# Patient Record
Sex: FEMALE | Race: OTHER | HISPANIC OR LATINO | URBAN - METROPOLITAN AREA
[De-identification: names, ages, dates, MRNs, and addresses within clinical notes are randomized per-mention and may not be internally consistent; named-entity substitution may affect disease eponyms.]

---

## 2020-02-05 ENCOUNTER — EMERGENCY (EMERGENCY)
Facility: HOSPITAL | Age: 45
LOS: 1 days | Discharge: HOME | End: 2020-02-05
Attending: EMERGENCY MEDICINE
Payer: MEDICAID

## 2020-02-05 VITALS
WEIGHT: 111.99 LBS | DIASTOLIC BLOOD PRESSURE: 93 MMHG | HEIGHT: 61 IN | HEART RATE: 119 BPM | RESPIRATION RATE: 20 BRPM | TEMPERATURE: 98 F | SYSTOLIC BLOOD PRESSURE: 141 MMHG | OXYGEN SATURATION: 100 %

## 2020-02-05 VITALS
OXYGEN SATURATION: 98 % | HEART RATE: 70 BPM | TEMPERATURE: 98 F | DIASTOLIC BLOOD PRESSURE: 71 MMHG | RESPIRATION RATE: 18 BRPM | SYSTOLIC BLOOD PRESSURE: 119 MMHG

## 2020-02-05 LAB
ALBUMIN SERPL ELPH-MCNC: 3.7 G/DL — SIGNIFICANT CHANGE UP (ref 3.5–5)
ALP SERPL-CCNC: 57 U/L — SIGNIFICANT CHANGE UP (ref 40–120)
ALT FLD-CCNC: 14 U/L DA — SIGNIFICANT CHANGE UP (ref 10–60)
ANION GAP SERPL CALC-SCNC: 8 MMOL/L — SIGNIFICANT CHANGE UP (ref 5–17)
APPEARANCE UR: CLEAR — SIGNIFICANT CHANGE UP
AST SERPL-CCNC: 11 U/L — SIGNIFICANT CHANGE UP (ref 10–40)
BASOPHILS # BLD AUTO: 0.04 K/UL — SIGNIFICANT CHANGE UP (ref 0–0.2)
BASOPHILS NFR BLD AUTO: 0.7 % — SIGNIFICANT CHANGE UP (ref 0–2)
BILIRUB SERPL-MCNC: 0.3 MG/DL — SIGNIFICANT CHANGE UP (ref 0.2–1.2)
BILIRUB UR-MCNC: NEGATIVE — SIGNIFICANT CHANGE UP
BUN SERPL-MCNC: 11 MG/DL — SIGNIFICANT CHANGE UP (ref 7–18)
CALCIUM SERPL-MCNC: 8.8 MG/DL — SIGNIFICANT CHANGE UP (ref 8.4–10.5)
CHLORIDE SERPL-SCNC: 107 MMOL/L — SIGNIFICANT CHANGE UP (ref 96–108)
CO2 SERPL-SCNC: 23 MMOL/L — SIGNIFICANT CHANGE UP (ref 22–31)
COLOR SPEC: YELLOW — SIGNIFICANT CHANGE UP
CREAT SERPL-MCNC: 0.96 MG/DL — SIGNIFICANT CHANGE UP (ref 0.5–1.3)
DIFF PNL FLD: ABNORMAL
EOSINOPHIL # BLD AUTO: 0.18 K/UL — SIGNIFICANT CHANGE UP (ref 0–0.5)
EOSINOPHIL NFR BLD AUTO: 3.1 % — SIGNIFICANT CHANGE UP (ref 0–6)
GLUCOSE SERPL-MCNC: 87 MG/DL — SIGNIFICANT CHANGE UP (ref 70–99)
GLUCOSE UR QL: NEGATIVE — SIGNIFICANT CHANGE UP
HCG SERPL-ACNC: <1 MIU/ML — SIGNIFICANT CHANGE UP
HCT VFR BLD CALC: 42.3 % — SIGNIFICANT CHANGE UP (ref 34.5–45)
HGB BLD-MCNC: 14.3 G/DL — SIGNIFICANT CHANGE UP (ref 11.5–15.5)
IMM GRANULOCYTES NFR BLD AUTO: 0.2 % — SIGNIFICANT CHANGE UP (ref 0–1.5)
KETONES UR-MCNC: ABNORMAL
LEUKOCYTE ESTERASE UR-ACNC: NEGATIVE — SIGNIFICANT CHANGE UP
LIDOCAIN IGE QN: 83 U/L — SIGNIFICANT CHANGE UP (ref 73–393)
LYMPHOCYTES # BLD AUTO: 1.53 K/UL — SIGNIFICANT CHANGE UP (ref 1–3.3)
LYMPHOCYTES # BLD AUTO: 26.4 % — SIGNIFICANT CHANGE UP (ref 13–44)
MAGNESIUM SERPL-MCNC: 1.8 MG/DL — SIGNIFICANT CHANGE UP (ref 1.6–2.6)
MCHC RBC-ENTMCNC: 31.9 PG — SIGNIFICANT CHANGE UP (ref 27–34)
MCHC RBC-ENTMCNC: 33.8 GM/DL — SIGNIFICANT CHANGE UP (ref 32–36)
MCV RBC AUTO: 94.4 FL — SIGNIFICANT CHANGE UP (ref 80–100)
MONOCYTES # BLD AUTO: 0.4 K/UL — SIGNIFICANT CHANGE UP (ref 0–0.9)
MONOCYTES NFR BLD AUTO: 6.9 % — SIGNIFICANT CHANGE UP (ref 2–14)
NEUTROPHILS # BLD AUTO: 3.64 K/UL — SIGNIFICANT CHANGE UP (ref 1.8–7.4)
NEUTROPHILS NFR BLD AUTO: 62.7 % — SIGNIFICANT CHANGE UP (ref 43–77)
NITRITE UR-MCNC: NEGATIVE — SIGNIFICANT CHANGE UP
NRBC # BLD: 0 /100 WBCS — SIGNIFICANT CHANGE UP (ref 0–0)
PH UR: 5 — SIGNIFICANT CHANGE UP (ref 5–8)
PLATELET # BLD AUTO: 248 K/UL — SIGNIFICANT CHANGE UP (ref 150–400)
POTASSIUM SERPL-MCNC: 3.7 MMOL/L — SIGNIFICANT CHANGE UP (ref 3.5–5.3)
POTASSIUM SERPL-SCNC: 3.7 MMOL/L — SIGNIFICANT CHANGE UP (ref 3.5–5.3)
PROT SERPL-MCNC: 7.4 G/DL — SIGNIFICANT CHANGE UP (ref 6–8.3)
PROT UR-MCNC: 30 MG/DL
RBC # BLD: 4.48 M/UL — SIGNIFICANT CHANGE UP (ref 3.8–5.2)
RBC # FLD: 12.4 % — SIGNIFICANT CHANGE UP (ref 10.3–14.5)
SODIUM SERPL-SCNC: 138 MMOL/L — SIGNIFICANT CHANGE UP (ref 135–145)
SP GR SPEC: 1.02 — SIGNIFICANT CHANGE UP (ref 1.01–1.02)
UROBILINOGEN FLD QL: NEGATIVE — SIGNIFICANT CHANGE UP
WBC # BLD: 5.8 K/UL — SIGNIFICANT CHANGE UP (ref 3.8–10.5)
WBC # FLD AUTO: 5.8 K/UL — SIGNIFICANT CHANGE UP (ref 3.8–10.5)

## 2020-02-05 PROCEDURE — 99283 EMERGENCY DEPT VISIT LOW MDM: CPT

## 2020-02-05 PROCEDURE — 83690 ASSAY OF LIPASE: CPT

## 2020-02-05 PROCEDURE — 36415 COLL VENOUS BLD VENIPUNCTURE: CPT

## 2020-02-05 PROCEDURE — 85027 COMPLETE CBC AUTOMATED: CPT

## 2020-02-05 PROCEDURE — 81001 URINALYSIS AUTO W/SCOPE: CPT

## 2020-02-05 PROCEDURE — 87086 URINE CULTURE/COLONY COUNT: CPT

## 2020-02-05 PROCEDURE — 83735 ASSAY OF MAGNESIUM: CPT

## 2020-02-05 PROCEDURE — 80053 COMPREHEN METABOLIC PANEL: CPT

## 2020-02-05 PROCEDURE — 99284 EMERGENCY DEPT VISIT MOD MDM: CPT

## 2020-02-05 PROCEDURE — 84702 CHORIONIC GONADOTROPIN TEST: CPT

## 2020-02-05 RX ORDER — SODIUM CHLORIDE 9 MG/ML
1000 INJECTION INTRAMUSCULAR; INTRAVENOUS; SUBCUTANEOUS ONCE
Refills: 0 | Status: COMPLETED | OUTPATIENT
Start: 2020-02-05 | End: 2020-02-05

## 2020-02-05 RX ADMIN — SODIUM CHLORIDE 1000 MILLILITER(S): 9 INJECTION INTRAMUSCULAR; INTRAVENOUS; SUBCUTANEOUS at 11:09

## 2020-02-05 NOTE — ED PROVIDER NOTE - GASTROINTESTINAL, MLM
Large palpable fibroid. No lower abdominal tenderness to palpation. Mild epigastric tenderness to palpation.

## 2020-02-05 NOTE — ED ADULT NURSE NOTE - CHPI ED NUR SYMPTOMS NEG
no burning urination/no hematuria/no blood in stool/no chills/no abdominal distension/no fever/no vomiting/no dysuria/no nausea

## 2020-02-05 NOTE — ED PROVIDER NOTE - PATIENT PORTAL LINK FT
You can access the FollowMyHealth Patient Portal offered by [facility] by registering at the following website: [email or web address]. By joining [employer]’s FollowMyHealth portal, you will also be able to view your health information using other applications (apps) compatible with our system.

## 2020-02-05 NOTE — ED PROVIDER NOTE - OBJECTIVE STATEMENT
44 year old female with PMHx of fibroids and no pertinent PSHx presents to the ED with complaints of six days of watery diarrhea. Patient reports that she previously visited her primary care doctor for evaluation of her symptoms, and was prescribed Cipro at the time for a possible bacterial infection. Patient states that she is currently on her third day of taking the medication. Patient notes that today her diarrhea has been transparent with a small amount of blood in it. Patient otherwise denies any fever and all other acute complaints. Patient reports that she returned from vacation to the [place] three weeks ago. NKDA.

## 2020-02-05 NOTE — ED PROVIDER NOTE - CLINICAL SUMMARY MEDICAL DECISION MAKING FREE TEXT BOX
Patient with diarrhea for six days, with about six episodes per day. Will check labs, give IV fluids, and will reassess.

## 2020-02-06 LAB
CULTURE RESULTS: SIGNIFICANT CHANGE UP
SPECIMEN SOURCE: SIGNIFICANT CHANGE UP

## 2021-12-20 PROBLEM — Z00.00 ENCOUNTER FOR PREVENTIVE HEALTH EXAMINATION: Status: ACTIVE | Noted: 2021-12-20

## 2022-01-31 ENCOUNTER — APPOINTMENT (OUTPATIENT)
Dept: OBGYN | Facility: CLINIC | Age: 47
End: 2022-01-31

## 2022-02-14 ENCOUNTER — APPOINTMENT (OUTPATIENT)
Dept: OBGYN | Facility: CLINIC | Age: 47
End: 2022-02-14
Payer: MEDICAID

## 2022-02-14 VITALS
OXYGEN SATURATION: 100 % | WEIGHT: 107 LBS | BODY MASS INDEX: 21.01 KG/M2 | DIASTOLIC BLOOD PRESSURE: 97 MMHG | HEART RATE: 107 BPM | HEIGHT: 60 IN | SYSTOLIC BLOOD PRESSURE: 139 MMHG

## 2022-02-14 DIAGNOSIS — Z30.432 ENCOUNTER FOR REMOVAL OF INTRAUTERINE CONTRACEPTIVE DEVICE: ICD-10-CM

## 2022-02-14 PROCEDURE — 58301 REMOVE INTRAUTERINE DEVICE: CPT

## 2022-02-14 NOTE — PROCEDURE
[IUD Removal] : intrauterine device (IUD) removal [Time out performed] : Pre-procedure time out performed.  Patient's name, date of birth and procedure confirmed. [Consent Obtained] : Consent obtained [Risks] : risks [Benefits] : benefits [Alternatives] : alternatives [Patient] : patient [Speculum Placed] : speculum placed [Strings Visualized] : strings visualized [IUD Discarded] : IUD discarded [Sent to Pathology] : specimen was placed in buffered formalin and sent for pathology [Tolerated Well] : Patient tolerated the procedure well [No Complications] : no complications [Heavy Vaginal Bleeding] : for heavy vaginal bleeding [Pelvic Pain] : for pelvic pain [___ Week(s)] : in [unfilled] week(s) [de-identified] : pelvic pain

## 2022-02-14 NOTE — PLAN
[FreeTextEntry1] : f/u in 2 wks \par patient is considering having a myomectomy vs hysterectomy due to the large fibroid

## 2022-03-03 ENCOUNTER — APPOINTMENT (OUTPATIENT)
Dept: OBGYN | Facility: CLINIC | Age: 47
End: 2022-03-03
Payer: MEDICAID

## 2022-03-03 VITALS
HEART RATE: 80 BPM | SYSTOLIC BLOOD PRESSURE: 123 MMHG | WEIGHT: 108 LBS | BODY MASS INDEX: 21.09 KG/M2 | DIASTOLIC BLOOD PRESSURE: 83 MMHG | OXYGEN SATURATION: 99 %

## 2022-03-03 DIAGNOSIS — D25.9 LEIOMYOMA OF UTERUS, UNSPECIFIED: ICD-10-CM

## 2022-03-03 PROCEDURE — 99214 OFFICE O/P EST MOD 30 MIN: CPT

## 2022-03-03 NOTE — PLAN
[FreeTextEntry1] : risks benefits treatment options discussed with the patient\par patient wants to have a TLH BS \par

## 2022-03-03 NOTE — HISTORY OF PRESENT ILLNESS
[FreeTextEntry1] : patient is here for surgery consult\par she desires a hysterectomy due to big fibroid that gives her pelvic pain and pressure and AUB \par she had recently removed her IUD that was being expel \par pelvic sono shows 8 cm intramural fibroid \par surgery hx BTL \par

## 2022-03-03 NOTE — COUNSELING
[Nutrition/ Exercise/ Weight Management] : nutrition, exercise, weight management [Body Image] : body image [Vitamins/Supplements] : vitamins/supplements [Pre/Post Op Instructions] : pre/post op instructions

## 2022-05-10 DIAGNOSIS — Z01.818 ENCOUNTER FOR OTHER PREPROCEDURAL EXAMINATION: ICD-10-CM

## 2022-06-03 ENCOUNTER — OUTPATIENT (OUTPATIENT)
Dept: OUTPATIENT SERVICES | Facility: HOSPITAL | Age: 47
LOS: 1 days | End: 2022-06-03
Payer: MEDICAID

## 2022-06-03 VITALS
HEART RATE: 71 BPM | RESPIRATION RATE: 18 BRPM | DIASTOLIC BLOOD PRESSURE: 65 MMHG | TEMPERATURE: 99 F | HEIGHT: 62 IN | OXYGEN SATURATION: 99 % | SYSTOLIC BLOOD PRESSURE: 111 MMHG | WEIGHT: 117.07 LBS

## 2022-06-03 DIAGNOSIS — D25.9 LEIOMYOMA OF UTERUS, UNSPECIFIED: ICD-10-CM

## 2022-06-03 DIAGNOSIS — Z01.818 ENCOUNTER FOR OTHER PREPROCEDURAL EXAMINATION: ICD-10-CM

## 2022-06-03 DIAGNOSIS — Z98.51 TUBAL LIGATION STATUS: Chronic | ICD-10-CM

## 2022-06-03 LAB
A1C WITH ESTIMATED AVERAGE GLUCOSE RESULT: 5.1 % — SIGNIFICANT CHANGE UP (ref 4–5.6)
BLD GP AB SCN SERPL QL: SIGNIFICANT CHANGE UP
ESTIMATED AVERAGE GLUCOSE: 100 MG/DL — SIGNIFICANT CHANGE UP (ref 68–114)

## 2022-06-03 PROCEDURE — G0463: CPT

## 2022-06-03 NOTE — H&P PST ADULT - HISTORY OF PRESENT ILLNESS
This is a yr old female with PMH of presents with c/o prolonged and heavdue to fibroids. Pt for  This is a 46 yr old female with no PMH presents with c/o intermittent pelvic pain due to a large fibroid. Pt denies any vaginal bleeding. Pt is scheduled for laparoscopic total hysterectomy with bilateral salpingectomy on 6/8/2022.  This is a 46 yr old  female with no PMH presents with c/o intermittent pelvic pain due to a large fibroid that measures about 9.1 cm in size. Pt denies any vaginal bleeding. Pt is scheduled for laparoscopic total hysterectomy with bilateral salpingectomy on 6/8/2022.

## 2022-06-03 NOTE — H&P PST ADULT - PROBLEM SELECTOR PLAN 1
Pt is scheduled for laparoscopic total hysterectomy with bilateral salpingectomy on 6/8/2022.   As per pt, she was optimized by PCP for surgery.  Preoperative instructions discussed with pt via [place] speaking daughter Kristen Hutson and pt's daughter Kristen.  Written instructions in [place] given to pt. Instructed pt that she will need someone to escort her home after surgery, to notify security when she arrives in the lobby of the hospital that she is here for surgery, not to eat or drink anything after midnight the night before the surgery, to avoid NSAIDs such as Ibuprofen, Motrin, Aleve, Advil, Naproxen before surgery, to take Tylenol if needed for pain, to report if she has been exposed to any one with any contagious diseases including Covid-19 or if she is exhibiting any symptoms of COVID-19, to keep appointment for COVID-19  test 3 days before surgery. Instructed about use of Chlorhexidine 4% soap for 3 days before surgery including the morning of the surgery to prevent infection. Verbalized understanding of instructions given.   WENDY stop bang score 1. Pt denies history of WENDY, never did sleep study, is at low risk for sleep apnea and is at low risk for pulmonary complications.

## 2022-06-03 NOTE — H&P PST ADULT - FEMALE-SPECIFIC SYMPTOMS
due to a large fibroid 9.1 cm/amenorrhea/pelvic pain due to a large fibroid about 9.1 cm in size/amenorrhea/pelvic pain

## 2022-06-03 NOTE — H&P PST ADULT - FALL HARM RISK - UNIVERSAL INTERVENTIONS
Bed in lowest position, wheels locked, appropriate side rails in place/Call bell, personal items and telephone in reach/Instruct patient to call for assistance before getting out of bed or chair/Non-slip footwear when patient is out of bed/[place] to call system/Physically safe environment - no spills, clutter or unnecessary equipment/Purposeful Proactive Rounding/Room/bathroom lighting operational, light cord in reach

## 2022-06-03 NOTE — H&P PST ADULT - ASSESSMENT
This is a 46 yr old female with no PMH presents with leiomyoma of uterus. Pt is scheduled for laparoscopic total hysterectomy with bilateral salpingectomy on 6/8/2022.     WENDY stop bang score 1. Pt denies history of WENDY, never did sleep study, is at low risk for sleep apnea and is at low risk for pulmonary complications.

## 2022-06-07 ENCOUNTER — TRANSCRIPTION ENCOUNTER (OUTPATIENT)
Age: 47
End: 2022-06-07

## 2022-06-08 ENCOUNTER — OUTPATIENT (OUTPATIENT)
Dept: OUTPATIENT SERVICES | Facility: HOSPITAL | Age: 47
LOS: 1 days | End: 2022-06-08
Payer: MEDICAID

## 2022-06-08 ENCOUNTER — TRANSCRIPTION ENCOUNTER (OUTPATIENT)
Age: 47
End: 2022-06-08

## 2022-06-08 ENCOUNTER — RESULT REVIEW (OUTPATIENT)
Age: 47
End: 2022-06-08

## 2022-06-08 VITALS
HEART RATE: 75 BPM | SYSTOLIC BLOOD PRESSURE: 117 MMHG | DIASTOLIC BLOOD PRESSURE: 69 MMHG | TEMPERATURE: 97 F | OXYGEN SATURATION: 100 % | RESPIRATION RATE: 17 BRPM

## 2022-06-08 VITALS
RESPIRATION RATE: 17 BRPM | TEMPERATURE: 99 F | WEIGHT: 117.07 LBS | HEIGHT: 62 IN | OXYGEN SATURATION: 100 % | DIASTOLIC BLOOD PRESSURE: 79 MMHG | SYSTOLIC BLOOD PRESSURE: 117 MMHG | HEART RATE: 86 BPM

## 2022-06-08 DIAGNOSIS — D25.9 LEIOMYOMA OF UTERUS, UNSPECIFIED: ICD-10-CM

## 2022-06-08 DIAGNOSIS — Z98.51 TUBAL LIGATION STATUS: Chronic | ICD-10-CM

## 2022-06-08 LAB
BLD GP AB SCN SERPL QL: SIGNIFICANT CHANGE UP
GLUCOSE BLDC GLUCOMTR-MCNC: 115 MG/DL — HIGH (ref 70–99)
GLUCOSE BLDC GLUCOMTR-MCNC: 92 MG/DL — SIGNIFICANT CHANGE UP (ref 70–99)
HCG UR QL: NEGATIVE — SIGNIFICANT CHANGE UP

## 2022-06-08 PROCEDURE — C1889: CPT

## 2022-06-08 PROCEDURE — 81025 URINE PREGNANCY TEST: CPT

## 2022-06-08 PROCEDURE — 86900 BLOOD TYPING SEROLOGIC ABO: CPT

## 2022-06-08 PROCEDURE — 82962 GLUCOSE BLOOD TEST: CPT

## 2022-06-08 PROCEDURE — 58558 HYSTEROSCOPY BIOPSY: CPT

## 2022-06-08 PROCEDURE — 86901 BLOOD TYPING SEROLOGIC RH(D): CPT

## 2022-06-08 PROCEDURE — 58573 TLH W/T/O UTERUS OVER 250 G: CPT

## 2022-06-08 PROCEDURE — 36415 COLL VENOUS BLD VENIPUNCTURE: CPT

## 2022-06-08 PROCEDURE — 86850 RBC ANTIBODY SCREEN: CPT

## 2022-06-08 DEVICE — SURGICEL POWDER 3.0GR
Type: IMPLANTABLE DEVICE | Status: NON-FUNCTIONAL
Removed: 2022-06-08

## 2022-06-08 RX ORDER — ACETAMINOPHEN 500 MG
2 TABLET ORAL
Qty: 40 | Refills: 1
Start: 2022-06-08 | End: 2022-06-17

## 2022-06-08 RX ORDER — SIMETHICONE 80 MG/1
1 TABLET, CHEWABLE ORAL
Qty: 30 | Refills: 0
Start: 2022-06-08 | End: 2022-06-12

## 2022-06-08 RX ORDER — ONDANSETRON 8 MG/1
4 TABLET, FILM COATED ORAL ONCE
Refills: 0 | Status: DISCONTINUED | OUTPATIENT
Start: 2022-06-08 | End: 2022-06-08

## 2022-06-08 RX ORDER — ACETAMINOPHEN 500 MG
2 TABLET ORAL
Qty: 0 | Refills: 0 | DISCHARGE

## 2022-06-08 RX ORDER — FENTANYL CITRATE 50 UG/ML
25 INJECTION INTRAVENOUS
Refills: 0 | Status: DISCONTINUED | OUTPATIENT
Start: 2022-06-08 | End: 2022-06-08

## 2022-06-08 RX ORDER — ACETAMINOPHEN 500 MG
1000 TABLET ORAL ONCE
Refills: 0 | Status: DISCONTINUED | OUTPATIENT
Start: 2022-06-08 | End: 2022-06-08

## 2022-06-08 RX ORDER — PANTOPRAZOLE SODIUM 20 MG/1
1 TABLET, DELAYED RELEASE ORAL
Qty: 30 | Refills: 0
Start: 2022-06-08 | End: 2022-07-07

## 2022-06-08 RX ORDER — METOCLOPRAMIDE HCL 10 MG
10 TABLET ORAL ONCE
Refills: 0 | Status: DISCONTINUED | OUTPATIENT
Start: 2022-06-08 | End: 2022-06-22

## 2022-06-08 RX ORDER — SIMETHICONE 80 MG/1
80 TABLET, CHEWABLE ORAL ONCE
Refills: 0 | Status: DISCONTINUED | OUTPATIENT
Start: 2022-06-08 | End: 2022-06-22

## 2022-06-08 RX ORDER — SODIUM CHLORIDE 9 MG/ML
3 INJECTION INTRAMUSCULAR; INTRAVENOUS; SUBCUTANEOUS EVERY 8 HOURS
Refills: 0 | Status: DISCONTINUED | OUTPATIENT
Start: 2022-06-08 | End: 2022-06-08

## 2022-06-08 RX ORDER — IBUPROFEN 200 MG
1 TABLET ORAL
Qty: 15 | Refills: 1
Start: 2022-06-08 | End: 2022-06-17

## 2022-06-08 RX ORDER — SENNOSIDES/DOCUSATE SODIUM 8.6MG-50MG
1 TABLET ORAL
Qty: 60 | Refills: 0
Start: 2022-06-08 | End: 2022-07-07

## 2022-06-08 RX ORDER — CHLORHEXIDINE GLUCONATE 213 G/1000ML
1 SOLUTION TOPICAL ONCE
Refills: 0 | Status: COMPLETED | OUTPATIENT
Start: 2022-06-08 | End: 2022-06-08

## 2022-06-08 RX ORDER — HYDROMORPHONE HYDROCHLORIDE 2 MG/ML
0.5 INJECTION INTRAMUSCULAR; INTRAVENOUS; SUBCUTANEOUS
Refills: 0 | Status: DISCONTINUED | OUTPATIENT
Start: 2022-06-08 | End: 2022-06-08

## 2022-06-08 RX ADMIN — CHLORHEXIDINE GLUCONATE 1 APPLICATION(S): 213 SOLUTION TOPICAL at 08:42

## 2022-06-08 NOTE — ASU PATIENT PROFILE, ADULT - FALL HARM RISK - UNIVERSAL INTERVENTIONS
Bed in lowest position, wheels locked, appropriate side rails in place/Call bell, personal items and telephone in reach/Instruct patient to call for assistance before getting out of bed or chair/Non-slip footwear when patient is out of bed/[place] to call system/Physically safe environment - no spills, clutter or unnecessary equipment/Purposeful Proactive Rounding/Room/bathroom lighting operational, light cord in reach

## 2022-06-08 NOTE — ASU DISCHARGE PLAN (ADULT/PEDIATRIC) - NS MD DC FALL RISK RISK
For information on Fall & Injury Prevention, visit: [email or web address] OR  [email or web address] OR  https://www.cdc.gov/steadi/patient.html

## 2022-06-08 NOTE — ASU DISCHARGE PLAN (ADULT/PEDIATRIC) - ACTIVITY LEVEL
No excercise/Nothing per rectum/Nothing per vagina/No tub baths/No douching/No tampons/No intercourse

## 2022-06-08 NOTE — ASU PREOP CHECKLIST - SITE MARKED BY ANESTHESIOLOGIST
Substance Abuse    Chemical dependency is an addiction to drugs or alcohol. It is characterized by the repeated behavior of seeking out and using drugs and alcohol despite harmful consequences to the health and safety of oneself and others. Using drugs in a manner that brought you to an Emergency Room suggests you may have an drug abuse problem. Seek help at a drug addiction center.    SEEK IMMEDIATE MEDICAL CARE IF YOU HAVE ANY OF THE FOLLOWING SYMPTOMS: chest pain, shortness of breath, change in mental status, thoughts about hurting killing yourself, thoughts about hurting or killing somebody else, hallucinations, or worsening depression.
n/a

## 2022-06-08 NOTE — ASU DISCHARGE PLAN (ADULT/PEDIATRIC) - ASU DC SPECIAL INSTRUCTIONSFT
no sex nothing in vagina no heavy lifting no pushing eat high fiber food ambulation daily as tolerated shower daily clean wound well and keep dry after; see your gynecologist in 1-2wks for follow up    SEE DR CARO IN 2WEEKS FOR WOUND CHECK  MAKE SURE YOU KEEP YOUR APPOINTMENT

## 2022-06-08 NOTE — PROGRESS NOTE ADULT - SUBJECTIVE AND OBJECTIVE BOX
post op note   s/p laparoscopy hyst/bs  pt in bed 14 in amb surg    pt doing well  ate crackers and juice  denies n/v  due to void post op     Vital Signs Last 24 Hrs  T(C): 36.1 (08 Jun 2022 14:23), Max: 37 (08 Jun 2022 08:25)  T(F): 97 (08 Jun 2022 14:23), Max: 98.6 (08 Jun 2022 08:25)  HR: 75 (08 Jun 2022 14:23) (73 - 86)  BP: 117/69 (08 Jun 2022 14:23) (108/71 - 129/76)  BP(mean): 83 (08 Jun 2022 14:00) (83 - 96)  RR: 17 (08 Jun 2022 14:23) (13 - 19)  SpO2: 100% (08 Jun 2022 14:23) (99% - 100%)    abd soft ND no guarding no rebound  inc sites no erythema/edema/bleeding  pelvic pad: dry no active vag bleeding    -dc home p void  -dw dr duran

## 2022-06-08 NOTE — ASU DISCHARGE PLAN (ADULT/PEDIATRIC) - CARE PROVIDER_API CALL
Windy Patterson; MS)  [facility] at [address], [place] [address]  Phone: [phone]  Fax: [phone]  Established Patient  Follow Up Time: 2 weeks

## 2022-06-08 NOTE — ASU PREOP CHECKLIST - HEIGHT IN INCHES
Review of Systems/Medical History  Patient summary reviewed  Chart reviewed  No history of anesthetic complications     Cardiovascular  Negative cardio ROS Hypertension ,    Pulmonary  Negative pulmonary ROS Smoker ex-smoker  ,        GI/Hepatic  Negative GI/hepatic ROS          Negative  ROS        Endo/Other  Negative endo/other ROS      GYN  Negative gynecology ROS          Hematology  Negative hematology ROS      Musculoskeletal  Negative musculoskeletal ROS        Neurology  Negative neurology ROS      Psychology   Negative psychology ROS            Recent Results (from the past 672 hour(s))   Novel Coronavirus (COVID-19), PCR [facility]    Collection Time: 07/15/20  9:37 AM   Result Value Ref Range    SARS-CoV-2  Not Detected Not Detected   INPATIENT (COVID-19 HIGH PRIORITY)    Collection Time: 07/15/20  9:37 AM   Result Value Ref Range    Inpatient Comment          Physical Exam    Airway    Mallampati score: II  TM Distance: >3 FB  Neck ROM: full     Dental   No notable dental hx     Cardiovascular  Comment: Negative ROS, Rhythm: regular, Rate: normal,     Pulmonary  Breath sounds clear to auscultation,     Other Findings        Anesthesia Plan  ASA Score- 2     Anesthesia Type- IV sedation with anesthesia with ASA Monitors  Additional Monitors:   Airway Plan:         Plan Factors-    Induction-     Postoperative Plan-     Informed Consent- Anesthetic plan and risks discussed with patient  I personally reviewed this patient with the CRNA  Discussed and agreed on the Anesthesia Plan with the CRNA  Romy Martino
2

## 2022-06-09 PROBLEM — D21.9 BENIGN NEOPLASM OF CONNECTIVE AND OTHER SOFT TISSUE, UNSPECIFIED: Chronic | Status: INACTIVE | Noted: 2020-02-05 | Resolved: 2022-06-03

## 2022-06-09 PROBLEM — D25.9 LEIOMYOMA OF UTERUS, UNSPECIFIED: Chronic | Status: ACTIVE | Noted: 2022-06-03

## 2022-06-10 LAB — SURGICAL PATHOLOGY STUDY: SIGNIFICANT CHANGE UP

## 2022-06-17 ENCOUNTER — TRANSCRIPTION ENCOUNTER (OUTPATIENT)
Age: 47
End: 2022-06-17

## 2022-06-17 ENCOUNTER — INPATIENT (INPATIENT)
Facility: HOSPITAL | Age: 47
LOS: 7 days | Discharge: HOME HEALTH | DRG: 856 | End: 2022-06-25
Attending: OBSTETRICS & GYNECOLOGY | Admitting: OBSTETRICS & GYNECOLOGY
Payer: MEDICAID

## 2022-06-17 VITALS
DIASTOLIC BLOOD PRESSURE: 73 MMHG | RESPIRATION RATE: 18 BRPM | HEIGHT: 62 IN | SYSTOLIC BLOOD PRESSURE: 104 MMHG | HEART RATE: 122 BPM | TEMPERATURE: 98 F | WEIGHT: 117.07 LBS | OXYGEN SATURATION: 97 %

## 2022-06-17 DIAGNOSIS — Z98.51 TUBAL LIGATION STATUS: Chronic | ICD-10-CM

## 2022-06-17 DIAGNOSIS — I82.890 ACUTE EMBOLISM AND THROMBOSIS OF OTHER SPECIFIED VEINS: ICD-10-CM

## 2022-06-17 DIAGNOSIS — T81.49XA INFECTION FOLLOWING A PROCEDURE, OTHER SURGICAL SITE, INITIAL ENCOUNTER: ICD-10-CM

## 2022-06-17 DIAGNOSIS — N73.9 FEMALE PELVIC INFLAMMATORY DISEASE, UNSPECIFIED: ICD-10-CM

## 2022-06-17 LAB
ALBUMIN SERPL ELPH-MCNC: 2.6 G/DL — LOW (ref 3.5–5)
ALP SERPL-CCNC: 95 U/L — SIGNIFICANT CHANGE UP (ref 40–120)
ALT FLD-CCNC: 47 U/L DA — SIGNIFICANT CHANGE UP (ref 10–60)
ANION GAP SERPL CALC-SCNC: 9 MMOL/L — SIGNIFICANT CHANGE UP (ref 5–17)
APPEARANCE UR: CLEAR — SIGNIFICANT CHANGE UP
AST SERPL-CCNC: 28 U/L — SIGNIFICANT CHANGE UP (ref 10–40)
BASOPHILS # BLD AUTO: 0.02 K/UL — SIGNIFICANT CHANGE UP (ref 0–0.2)
BASOPHILS NFR BLD AUTO: 0.2 % — SIGNIFICANT CHANGE UP (ref 0–2)
BILIRUB DIRECT SERPL-MCNC: 0.2 MG/DL — SIGNIFICANT CHANGE UP (ref 0–0.3)
BILIRUB INDIRECT FLD-MCNC: 0.2 MG/DL — SIGNIFICANT CHANGE UP (ref 0.2–1)
BILIRUB SERPL-MCNC: 0.4 MG/DL — SIGNIFICANT CHANGE UP (ref 0.2–1.2)
BILIRUB UR-MCNC: NEGATIVE — SIGNIFICANT CHANGE UP
BUN SERPL-MCNC: 12 MG/DL — SIGNIFICANT CHANGE UP (ref 7–18)
CALCIUM SERPL-MCNC: 9.1 MG/DL — SIGNIFICANT CHANGE UP (ref 8.4–10.5)
CHLORIDE SERPL-SCNC: 105 MMOL/L — SIGNIFICANT CHANGE UP (ref 96–108)
CO2 SERPL-SCNC: 25 MMOL/L — SIGNIFICANT CHANGE UP (ref 22–31)
COLOR SPEC: YELLOW — SIGNIFICANT CHANGE UP
CREAT SERPL-MCNC: 1.4 MG/DL — HIGH (ref 0.5–1.3)
DIFF PNL FLD: ABNORMAL
EGFR: 47 ML/MIN/1.73M2 — LOW
EOSINOPHIL # BLD AUTO: 0 K/UL — SIGNIFICANT CHANGE UP (ref 0–0.5)
EOSINOPHIL NFR BLD AUTO: 0 % — SIGNIFICANT CHANGE UP (ref 0–6)
GLUCOSE SERPL-MCNC: 132 MG/DL — HIGH (ref 70–99)
GLUCOSE UR QL: NEGATIVE — SIGNIFICANT CHANGE UP
HCG SERPL-ACNC: 4 MIU/ML — SIGNIFICANT CHANGE UP
HCT VFR BLD CALC: 39.1 % — SIGNIFICANT CHANGE UP (ref 34.5–45)
HGB BLD-MCNC: 13.5 G/DL — SIGNIFICANT CHANGE UP (ref 11.5–15.5)
IMM GRANULOCYTES NFR BLD AUTO: 2.2 % — HIGH (ref 0–1.5)
KETONES UR-MCNC: ABNORMAL
LACTATE SERPL-SCNC: 1.8 MMOL/L — SIGNIFICANT CHANGE UP (ref 0.7–2)
LEUKOCYTE ESTERASE UR-ACNC: ABNORMAL
LIDOCAIN IGE QN: 98 U/L — SIGNIFICANT CHANGE UP (ref 73–393)
LYMPHOCYTES # BLD AUTO: 0.72 K/UL — LOW (ref 1–3.3)
LYMPHOCYTES # BLD AUTO: 9 % — LOW (ref 13–44)
MCHC RBC-ENTMCNC: 31.2 PG — SIGNIFICANT CHANGE UP (ref 27–34)
MCHC RBC-ENTMCNC: 34.5 GM/DL — SIGNIFICANT CHANGE UP (ref 32–36)
MCV RBC AUTO: 90.3 FL — SIGNIFICANT CHANGE UP (ref 80–100)
MONOCYTES # BLD AUTO: 0.42 K/UL — SIGNIFICANT CHANGE UP (ref 0–0.9)
MONOCYTES NFR BLD AUTO: 5.2 % — SIGNIFICANT CHANGE UP (ref 2–14)
NEUTROPHILS # BLD AUTO: 6.7 K/UL — SIGNIFICANT CHANGE UP (ref 1.8–7.4)
NEUTROPHILS NFR BLD AUTO: 83.4 % — HIGH (ref 43–77)
NITRITE UR-MCNC: NEGATIVE — SIGNIFICANT CHANGE UP
NRBC # BLD: 0 /100 WBCS — SIGNIFICANT CHANGE UP (ref 0–0)
PH UR: 5 — SIGNIFICANT CHANGE UP (ref 5–8)
PLATELET # BLD AUTO: 373 K/UL — SIGNIFICANT CHANGE UP (ref 150–400)
POTASSIUM SERPL-MCNC: 3.8 MMOL/L — SIGNIFICANT CHANGE UP (ref 3.5–5.3)
POTASSIUM SERPL-SCNC: 3.8 MMOL/L — SIGNIFICANT CHANGE UP (ref 3.5–5.3)
PROT SERPL-MCNC: 7.8 G/DL — SIGNIFICANT CHANGE UP (ref 6–8.3)
PROT UR-MCNC: 100
RBC # BLD: 4.33 M/UL — SIGNIFICANT CHANGE UP (ref 3.8–5.2)
RBC # FLD: 12.1 % — SIGNIFICANT CHANGE UP (ref 10.3–14.5)
SARS-COV-2 RNA SPEC QL NAA+PROBE: SIGNIFICANT CHANGE UP
SODIUM SERPL-SCNC: 139 MMOL/L — SIGNIFICANT CHANGE UP (ref 135–145)
SP GR SPEC: 1.01 — SIGNIFICANT CHANGE UP (ref 1.01–1.02)
UROBILINOGEN FLD QL: NEGATIVE — SIGNIFICANT CHANGE UP
WBC # BLD: 8.04 K/UL — SIGNIFICANT CHANGE UP (ref 3.8–10.5)
WBC # FLD AUTO: 8.04 K/UL — SIGNIFICANT CHANGE UP (ref 3.8–10.5)

## 2022-06-17 PROCEDURE — 71045 X-RAY EXAM CHEST 1 VIEW: CPT | Mod: 26

## 2022-06-17 PROCEDURE — 74177 CT ABD & PELVIS W/CONTRAST: CPT | Mod: 26,MA

## 2022-06-17 PROCEDURE — 99285 EMERGENCY DEPT VISIT HI MDM: CPT

## 2022-06-17 RX ORDER — CEFTRIAXONE 500 MG/1
1000 INJECTION, POWDER, FOR SOLUTION INTRAMUSCULAR; INTRAVENOUS EVERY 24 HOURS
Refills: 0 | Status: COMPLETED | OUTPATIENT
Start: 2022-06-18 | End: 2022-06-24

## 2022-06-17 RX ORDER — VANCOMYCIN HCL 1 G
1000 VIAL (EA) INTRAVENOUS ONCE
Refills: 0 | Status: COMPLETED | OUTPATIENT
Start: 2022-06-17 | End: 2022-06-17

## 2022-06-17 RX ORDER — MORPHINE SULFATE 50 MG/1
4 CAPSULE, EXTENDED RELEASE ORAL EVERY 4 HOURS
Refills: 0 | Status: DISCONTINUED | OUTPATIENT
Start: 2022-06-17 | End: 2022-06-19

## 2022-06-17 RX ORDER — MORPHINE SULFATE 50 MG/1
4 CAPSULE, EXTENDED RELEASE ORAL ONCE
Refills: 0 | Status: DISCONTINUED | OUTPATIENT
Start: 2022-06-17 | End: 2022-06-17

## 2022-06-17 RX ORDER — ONDANSETRON 8 MG/1
4 TABLET, FILM COATED ORAL ONCE
Refills: 0 | Status: COMPLETED | OUTPATIENT
Start: 2022-06-17 | End: 2022-06-17

## 2022-06-17 RX ORDER — SODIUM CHLORIDE 9 MG/ML
2000 INJECTION INTRAMUSCULAR; INTRAVENOUS; SUBCUTANEOUS ONCE
Refills: 0 | Status: COMPLETED | OUTPATIENT
Start: 2022-06-17 | End: 2022-06-17

## 2022-06-17 RX ORDER — FAMOTIDINE 10 MG/ML
20 INJECTION INTRAVENOUS ONCE
Refills: 0 | Status: COMPLETED | OUTPATIENT
Start: 2022-06-17 | End: 2022-06-17

## 2022-06-17 RX ORDER — ACETAMINOPHEN 500 MG
650 TABLET ORAL ONCE
Refills: 0 | Status: COMPLETED | OUTPATIENT
Start: 2022-06-17 | End: 2022-06-17

## 2022-06-17 RX ORDER — CEFEPIME 1 G/1
2000 INJECTION, POWDER, FOR SOLUTION INTRAMUSCULAR; INTRAVENOUS ONCE
Refills: 0 | Status: COMPLETED | OUTPATIENT
Start: 2022-06-17 | End: 2022-06-17

## 2022-06-17 RX ORDER — CEFTRIAXONE 500 MG/1
INJECTION, POWDER, FOR SOLUTION INTRAMUSCULAR; INTRAVENOUS
Refills: 0 | Status: COMPLETED | OUTPATIENT
Start: 2022-06-17 | End: 2022-06-25

## 2022-06-17 RX ORDER — DIATRIZOATE MEGLUMINE 180 MG/ML
30 INJECTION, SOLUTION INTRAVESICAL ONCE
Refills: 0 | Status: COMPLETED | OUTPATIENT
Start: 2022-06-17 | End: 2022-06-17

## 2022-06-17 RX ORDER — INFLUENZA VIRUS VACCINE 15; 15; 15; 15 UG/.5ML; UG/.5ML; UG/.5ML; UG/.5ML
0.5 SUSPENSION INTRAMUSCULAR ONCE
Refills: 0 | Status: DISCONTINUED | OUTPATIENT
Start: 2022-06-17 | End: 2022-06-25

## 2022-06-17 RX ORDER — SODIUM CHLORIDE 9 MG/ML
1000 INJECTION, SOLUTION INTRAVENOUS
Refills: 0 | Status: DISCONTINUED | OUTPATIENT
Start: 2022-06-17 | End: 2022-06-19

## 2022-06-17 RX ORDER — ONDANSETRON 8 MG/1
4 TABLET, FILM COATED ORAL EVERY 6 HOURS
Refills: 0 | Status: DISCONTINUED | OUTPATIENT
Start: 2022-06-17 | End: 2022-06-25

## 2022-06-17 RX ORDER — ENOXAPARIN SODIUM 100 MG/ML
80 INJECTION SUBCUTANEOUS EVERY 24 HOURS
Refills: 0 | Status: DISCONTINUED | OUTPATIENT
Start: 2022-06-17 | End: 2022-06-18

## 2022-06-17 RX ORDER — KETOROLAC TROMETHAMINE 30 MG/ML
30 SYRINGE (ML) INJECTION EVERY 6 HOURS
Refills: 0 | Status: DISCONTINUED | OUTPATIENT
Start: 2022-06-17 | End: 2022-06-19

## 2022-06-17 RX ORDER — CEFTRIAXONE 500 MG/1
1000 INJECTION, POWDER, FOR SOLUTION INTRAMUSCULAR; INTRAVENOUS ONCE
Refills: 0 | Status: COMPLETED | OUTPATIENT
Start: 2022-06-17 | End: 2022-06-17

## 2022-06-17 RX ORDER — SODIUM CHLORIDE 9 MG/ML
1000 INJECTION INTRAMUSCULAR; INTRAVENOUS; SUBCUTANEOUS ONCE
Refills: 0 | Status: COMPLETED | OUTPATIENT
Start: 2022-06-17 | End: 2022-06-17

## 2022-06-17 RX ADMIN — SODIUM CHLORIDE 1000 MILLILITER(S): 9 INJECTION INTRAMUSCULAR; INTRAVENOUS; SUBCUTANEOUS at 14:00

## 2022-06-17 RX ADMIN — SODIUM CHLORIDE 2000 MILLILITER(S): 9 INJECTION INTRAMUSCULAR; INTRAVENOUS; SUBCUTANEOUS at 11:05

## 2022-06-17 RX ADMIN — MORPHINE SULFATE 4 MILLIGRAM(S): 50 CAPSULE, EXTENDED RELEASE ORAL at 12:10

## 2022-06-17 RX ADMIN — MORPHINE SULFATE 4 MILLIGRAM(S): 50 CAPSULE, EXTENDED RELEASE ORAL at 23:51

## 2022-06-17 RX ADMIN — DIATRIZOATE MEGLUMINE 30 MILLILITER(S): 180 INJECTION, SOLUTION INTRAVESICAL at 10:45

## 2022-06-17 RX ADMIN — CEFEPIME 2000 MILLIGRAM(S): 1 INJECTION, POWDER, FOR SOLUTION INTRAMUSCULAR; INTRAVENOUS at 12:10

## 2022-06-17 RX ADMIN — FAMOTIDINE 20 MILLIGRAM(S): 10 INJECTION INTRAVENOUS at 10:46

## 2022-06-17 RX ADMIN — Medication 100 MILLIGRAM(S): at 22:09

## 2022-06-17 RX ADMIN — MORPHINE SULFATE 4 MILLIGRAM(S): 50 CAPSULE, EXTENDED RELEASE ORAL at 15:48

## 2022-06-17 RX ADMIN — Medication 650 MILLIGRAM(S): at 11:11

## 2022-06-17 RX ADMIN — SODIUM CHLORIDE 2000 MILLILITER(S): 9 INJECTION INTRAMUSCULAR; INTRAVENOUS; SUBCUTANEOUS at 12:10

## 2022-06-17 RX ADMIN — Medication 100 MILLIGRAM(S): at 18:03

## 2022-06-17 RX ADMIN — CEFEPIME 100 MILLIGRAM(S): 1 INJECTION, POWDER, FOR SOLUTION INTRAMUSCULAR; INTRAVENOUS at 11:06

## 2022-06-17 RX ADMIN — SODIUM CHLORIDE 125 MILLILITER(S): 9 INJECTION, SOLUTION INTRAVENOUS at 21:18

## 2022-06-17 RX ADMIN — Medication 650 MILLIGRAM(S): at 12:10

## 2022-06-17 RX ADMIN — ONDANSETRON 4 MILLIGRAM(S): 8 TABLET, FILM COATED ORAL at 10:46

## 2022-06-17 RX ADMIN — ENOXAPARIN SODIUM 80 MILLIGRAM(S): 100 INJECTION SUBCUTANEOUS at 18:07

## 2022-06-17 RX ADMIN — Medication 30 MILLIGRAM(S): at 21:32

## 2022-06-17 RX ADMIN — Medication 250 MILLIGRAM(S): at 12:15

## 2022-06-17 RX ADMIN — Medication 30 MILLIGRAM(S): at 20:34

## 2022-06-17 RX ADMIN — CEFTRIAXONE 1000 MILLIGRAM(S): 500 INJECTION, POWDER, FOR SOLUTION INTRAMUSCULAR; INTRAVENOUS at 16:30

## 2022-06-17 RX ADMIN — MORPHINE SULFATE 4 MILLIGRAM(S): 50 CAPSULE, EXTENDED RELEASE ORAL at 10:46

## 2022-06-17 NOTE — PROGRESS NOTE ADULT - SUBJECTIVE AND OBJECTIVE BOX
patient states she has new onset of abdominal pain since this morning, she is not able to void. Had an episode of vomiting and fever at home.   Bladder scan shows 460 ml

## 2022-06-17 NOTE — ED PROVIDER NOTE - OBJECTIVE STATEMENT
Declines , uses daughter at bedside.  46yoF prev healthy other than fibroids s/p hysterectomy with Dr. Patterson 6/8 (on EMR review), presents with severe epigastric abdominal pain, gassy in character, since this morning. Associated NBNB vx1 today and fever. Denies d/c, urinary symptoms, CP, SOB, vaginal discharge, and all other symptoms.

## 2022-06-17 NOTE — H&P ADULT - PROBLEM SELECTOR PLAN 2
S/p vanco in ED, continue rocephin/clinda  pain mgmt with morphine/toradol and tylenol as needed  repeat cbc/bmp in am   lovenox 80mg therapeutic dose  Pelvic MRV  possible IR intervention, unavailable at this time

## 2022-06-17 NOTE — PATIENT PROFILE ADULT - FALL HARM RISK - RISK INTERVENTIONS

## 2022-06-17 NOTE — ED PROVIDER NOTE - CARE PLAN
1 Principal Discharge DX:	Pelvic abscess in female  Secondary Diagnosis:	Sepsis  Secondary Diagnosis:	GIOVANI (acute kidney injury)

## 2022-06-17 NOTE — ED PROVIDER NOTE - PHYSICAL EXAMINATION
Febrile, hemodynamically stable, saturating well on RA  Appears uncomfortable, no WOB  Head NCAT  EOMI grossly, anicteric  MMM  Breathing comfortably on RA  Abd diffusely TTP +guarding, difficulty assessing due to pt pushing away  AAO, CN's 3-12 grossly intact  CHOPRA spontaneously, no leg cyanosis or edema  Skin warm, well perfused, no rashes or hives

## 2022-06-17 NOTE — ED ADULT NURSE NOTE - OBJECTIVE STATEMENT
Patient came to the ED a/o x 3 ambulates BIBA for abdominal pain. Pt had recent abdominal surgery x 1 week, + nausea, no diarrhea noted.

## 2022-06-17 NOTE — ED ADULT NURSE NOTE - NS_SISCREENINGSR_GEN_ALL_ED
Chronic kidney disease, unspecified CKD stage Chronic kidney disease, unspecified CKD stage Chronic kidney disease, unspecified CKD stage Chronic kidney disease, unspecified CKD stage Hypoxic ischemic encephalopathy, unspecified severity Hypertension, unspecified type Hypertension, unspecified type Hypertension, unspecified type Hypertension, unspecified type Hypertension, unspecified type Hypertension, unspecified type Hypertension, unspecified type Hypertension, unspecified type Hypertension, unspecified type Hypertension, unspecified type Hypertension, unspecified type Hypertension, unspecified type Hypertension, unspecified type Hypertension, unspecified type Hypertension, unspecified type Hypertension, unspecified type Hypertension, unspecified type Hypertension, unspecified type Hypertension, unspecified type Hypertension, unspecified type Hypertension, unspecified type Hypertension, unspecified type Hypertension, unspecified type Hypertension, unspecified type Hypertension, unspecified type Hypertension, unspecified type Hypertension, unspecified type Hypertension, unspecified type Hypertension, unspecified type Hypertension, unspecified type Negative

## 2022-06-17 NOTE — PROGRESS NOTE ADULT - ASSESSMENT
urine retention  possible abdominal abscess vs ovarian vein thrombosis    will try to void / if can not void kendall catheter to be placed for 24 hrs  broad antibiotics to be started   lovenox prophylaxis     monitor VS

## 2022-06-17 NOTE — H&P ADULT - HISTORY OF PRESENT ILLNESS
45yo female P2, s/p TLH/BS on  with Dr. Patterson  presents to ED via ambulance c/o severe epigastric pain since this morning at 6am. Fever 103 upon arrival, s/p code sepsis.  Per daughter, patient went to void and developed sudden onset of sharp abdominal pains and needed assistance to get back to bed. +fevers 100.1, nausea/vomiting x1, headache, weakness.  Denies chest pains, sob, palpitations, dysuria, hematuria, worsening vaginal bleeding or leg swelling.    PMH: denies  PSH: BTL, TLH/BS on , uncomplicated with Leonardo Jordan and Adonis Jordan  POBhx:  x2 95, 2002  Pgyn: s/p TLH/BS for chronic pelvic pain and fibroids; denies STDs, abnormal pap smears  Meds: tylenol, motrin as needed for pain  Allergies: NKDA  Social: denies smoking, etoh or drug use    PE: Pt appears pale and weak, AAOx3  shivering  ICU Vital Signs Last 24 Hrs  T(C): 37 (2022 15:42), Max: 39.6 (2022 10:43)  T(F): 98.6 (2022 15:42), Max: 103.2 (2022 10:43)  HR: 98 (2022 15:42) (96 - 122)  BP: 108/72 (2022 15:42) (101/68 - 116/76)  RR: 18 (2022 15:42) (18 - 19)  SpO2: 98% (2022 15:42) (96% - 100%)    Chest: CTA bilaterally, no W/R/R, no intercostal retractions   Cardiac: no murmurs appreciated  Abd: diffuse tenderness and guarding, patient grimacing with any touch to belly; unable to assess for masses due to pain  Ext: soft, NT; no edema  Gyn: no vaginal bleeding    Labs:                         13.5   8.04  )-----------( 373      ( 2022 11:15 )             39.1     -    139  |  105  |  12  ----------------------------<  132<H>  3.8   |  25  |  1.40<H>    Ca    9.1      2022 11:15    TPro  7.8  /  Alb  2.6<L>  /  TBili  0.4  /  DBili  0.2  /  AST  28  /  ALT  47  /  AlkPhos  95      CT Abdomen and Pelvis w/ Oral Cont and w/ IV Cont (22 @ 14:06) >  PROCEDURE DATE:  2022    INTERPRETATION:  CLINICAL INFORMATION: Epigastric pain. Status post   hysterectomy on 22.  COMPARISON: None.  CONTRAST/COMPLICATIONS:  IV Contrast: Omnipaque 350  90 cc administered   10 cc discarded  Oral Contrast: NONE  Complications: None reported at time of study completion  PROCEDURE:  CT of the Abdomen and Pelvis was performed.  Sagittal and coronal reformats were performed.    FINDINGS:  LOWER CHEST: Mild bibasilar atelectasis. Small hiatal hernia containing   oral contrast.  LIVER: Scattered cysts and additional hypodensities too small to   characterize.  BILE DUCTS: Normal caliber.  GALLBLADDER: Within normal limits.  SPLEEN: Within normal limits.  PANCREAS: Within normal limits.  ADRENALS: Within normal limits.  KIDNEYS/URETERS: Within normal limits.  BLADDER: Air in the bladder likely related to a recent Lora catheter.  REPRODUCTIVE ORGANS: Hysterectomy.  BOWEL: No bowel obstruction. Appendix is normal. Wall thickening of the   descending and rectosigmoid colon may be related to underdistention. Mild   wall thickening of small bowel loops in the right and lower abdomen   (example 2, 82 and 4, 26).  PERITONEUM: Small to moderate ascites with peripheral enhancement. Rim   enhancing air and fluid collection in the pelvis measuring up to 7.9  x   3.1 x 4.7 cm suspicious for abscess (2, 108). Smaller loculated area of   rim enhancing fluid extendsto the right upper pelvis measuring up to 1.6   x 1.6 cm (2, 95).  VESSELS: Question filling defect within the right ovarian vein distally   (4, 41).  RETROPERITONEUM/LYMPH NODES: No lymphadenopathy.  ABDOMINAL WALL: Small fat-containing bilateral hernia.  BONES: Within normal limits.  IMPRESSION:  1. Rim-enhancing air and fluid collection in the pelvis measuring up to   7.9 x 3.1 x 4.7 cm, suspicious for abscess. Small to moderate ascites   with peripheral enhancement.  2. Suspected enteritis. Mild wall thickening of the descending and   rectosigmoid colon may be related to underdistention versus colitis.  3. Question central filling defect within the right ovarian vein distally   versus interdigitating fluid between branches. MRV can be obtained to   further evaluate for ovarian vein thrombosis.    d/w Dr. Patterson

## 2022-06-17 NOTE — H&P ADULT - PROBLEM SELECTOR PLAN 1
d/w Dr. Patterson  S/p vern in ED, continue rocephin/clinda  pain mgmt with morphine/toradol and tylenol as needed  repeat cbc/bmp in am   lovenox 80mg therapeutic dose  Pelvic MRV  possible IR intervention, unavailable at this time

## 2022-06-17 NOTE — ED PROVIDER NOTE - CLINICAL SUMMARY MEDICAL DECISION MAKING FREE TEXT BOX
No CP/SOB and low suspicion for PE. Character low suspicion for torsion. No CP/SOB and low suspicion for PE. Character low suspicion for torsion. Noted pelvic abscess as source of symptoms. Given fluids, broad spectrum abx. Pain controlled at this time. NAD, hemodynamically stable. Admitted to OB/GYN for further care.

## 2022-06-18 LAB
ALBUMIN SERPL ELPH-MCNC: 2 G/DL — LOW (ref 3.5–5)
ALP SERPL-CCNC: 75 U/L — SIGNIFICANT CHANGE UP (ref 40–120)
ALT FLD-CCNC: 28 U/L DA — SIGNIFICANT CHANGE UP (ref 10–60)
ANION GAP SERPL CALC-SCNC: 7 MMOL/L — SIGNIFICANT CHANGE UP (ref 5–17)
ANION GAP SERPL CALC-SCNC: 9 MMOL/L — SIGNIFICANT CHANGE UP (ref 5–17)
APPEARANCE UR: ABNORMAL
AST SERPL-CCNC: 19 U/L — SIGNIFICANT CHANGE UP (ref 10–40)
BASOPHILS # BLD AUTO: 0 K/UL — SIGNIFICANT CHANGE UP (ref 0–0.2)
BASOPHILS NFR BLD AUTO: 0 % — SIGNIFICANT CHANGE UP (ref 0–2)
BILIRUB SERPL-MCNC: 0.5 MG/DL — SIGNIFICANT CHANGE UP (ref 0.2–1.2)
BILIRUB UR-MCNC: NEGATIVE — SIGNIFICANT CHANGE UP
BLD GP AB SCN SERPL QL: SIGNIFICANT CHANGE UP
BUN SERPL-MCNC: 13 MG/DL — SIGNIFICANT CHANGE UP (ref 7–18)
BUN SERPL-MCNC: 14 MG/DL — SIGNIFICANT CHANGE UP (ref 7–18)
CALCIUM SERPL-MCNC: 8.5 MG/DL — SIGNIFICANT CHANGE UP (ref 8.4–10.5)
CALCIUM SERPL-MCNC: 8.6 MG/DL — SIGNIFICANT CHANGE UP (ref 8.4–10.5)
CHLORIDE SERPL-SCNC: 107 MMOL/L — SIGNIFICANT CHANGE UP (ref 96–108)
CHLORIDE SERPL-SCNC: 109 MMOL/L — HIGH (ref 96–108)
CO2 SERPL-SCNC: 21 MMOL/L — LOW (ref 22–31)
CO2 SERPL-SCNC: 23 MMOL/L — SIGNIFICANT CHANGE UP (ref 22–31)
COLOR SPEC: YELLOW — SIGNIFICANT CHANGE UP
CREAT SERPL-MCNC: 1.44 MG/DL — HIGH (ref 0.5–1.3)
CREAT SERPL-MCNC: 1.87 MG/DL — HIGH (ref 0.5–1.3)
CULTURE RESULTS: NO GROWTH — SIGNIFICANT CHANGE UP
DIFF PNL FLD: ABNORMAL
EGFR: 33 ML/MIN/1.73M2 — LOW
EGFR: 45 ML/MIN/1.73M2 — LOW
EOSINOPHIL # BLD AUTO: 0 K/UL — SIGNIFICANT CHANGE UP (ref 0–0.5)
EOSINOPHIL NFR BLD AUTO: 0 % — SIGNIFICANT CHANGE UP (ref 0–6)
GLUCOSE SERPL-MCNC: 102 MG/DL — HIGH (ref 70–99)
GLUCOSE SERPL-MCNC: 94 MG/DL — SIGNIFICANT CHANGE UP (ref 70–99)
GLUCOSE UR QL: NEGATIVE — SIGNIFICANT CHANGE UP
HCT VFR BLD CALC: 36.1 % — SIGNIFICANT CHANGE UP (ref 34.5–45)
HCT VFR BLD CALC: 36.3 % — SIGNIFICANT CHANGE UP (ref 34.5–45)
HGB BLD-MCNC: 12.2 G/DL — SIGNIFICANT CHANGE UP (ref 11.5–15.5)
HGB BLD-MCNC: 12.4 G/DL — SIGNIFICANT CHANGE UP (ref 11.5–15.5)
KETONES UR-MCNC: ABNORMAL
LACTATE SERPL-SCNC: 1.4 MMOL/L — SIGNIFICANT CHANGE UP (ref 0.7–2)
LEUKOCYTE ESTERASE UR-ACNC: ABNORMAL
LYMPHOCYTES # BLD AUTO: 1.72 K/UL — SIGNIFICANT CHANGE UP (ref 1–3.3)
LYMPHOCYTES # BLD AUTO: 9 % — LOW (ref 13–44)
MCHC RBC-ENTMCNC: 31.3 PG — SIGNIFICANT CHANGE UP (ref 27–34)
MCHC RBC-ENTMCNC: 31.3 PG — SIGNIFICANT CHANGE UP (ref 27–34)
MCHC RBC-ENTMCNC: 33.8 GM/DL — SIGNIFICANT CHANGE UP (ref 32–36)
MCHC RBC-ENTMCNC: 34.2 GM/DL — SIGNIFICANT CHANGE UP (ref 32–36)
MCV RBC AUTO: 91.7 FL — SIGNIFICANT CHANGE UP (ref 80–100)
MCV RBC AUTO: 92.6 FL — SIGNIFICANT CHANGE UP (ref 80–100)
MONOCYTES # BLD AUTO: 0.19 K/UL — SIGNIFICANT CHANGE UP (ref 0–0.9)
MONOCYTES NFR BLD AUTO: 1 % — LOW (ref 2–14)
NEUTROPHILS # BLD AUTO: 16.58 K/UL — HIGH (ref 1.8–7.4)
NEUTROPHILS NFR BLD AUTO: 87 % — HIGH (ref 43–77)
NITRITE UR-MCNC: NEGATIVE — SIGNIFICANT CHANGE UP
NRBC # BLD: 0 /100 WBCS — SIGNIFICANT CHANGE UP (ref 0–0)
PH UR: 6 — SIGNIFICANT CHANGE UP (ref 5–8)
PLATELET # BLD AUTO: 372 K/UL — SIGNIFICANT CHANGE UP (ref 150–400)
PLATELET # BLD AUTO: 384 K/UL — SIGNIFICANT CHANGE UP (ref 150–400)
POTASSIUM SERPL-MCNC: 3.9 MMOL/L — SIGNIFICANT CHANGE UP (ref 3.5–5.3)
POTASSIUM SERPL-MCNC: 4.1 MMOL/L — SIGNIFICANT CHANGE UP (ref 3.5–5.3)
POTASSIUM SERPL-SCNC: 3.9 MMOL/L — SIGNIFICANT CHANGE UP (ref 3.5–5.3)
POTASSIUM SERPL-SCNC: 4.1 MMOL/L — SIGNIFICANT CHANGE UP (ref 3.5–5.3)
PROT SERPL-MCNC: 6.7 G/DL — SIGNIFICANT CHANGE UP (ref 6–8.3)
PROT UR-MCNC: 100
RBC # BLD: 3.9 M/UL — SIGNIFICANT CHANGE UP (ref 3.8–5.2)
RBC # BLD: 3.96 M/UL — SIGNIFICANT CHANGE UP (ref 3.8–5.2)
RBC # FLD: 12.7 % — SIGNIFICANT CHANGE UP (ref 10.3–14.5)
RBC # FLD: 13 % — SIGNIFICANT CHANGE UP (ref 10.3–14.5)
SODIUM SERPL-SCNC: 137 MMOL/L — SIGNIFICANT CHANGE UP (ref 135–145)
SODIUM SERPL-SCNC: 139 MMOL/L — SIGNIFICANT CHANGE UP (ref 135–145)
SP GR SPEC: 1.02 — SIGNIFICANT CHANGE UP (ref 1.01–1.02)
SPECIMEN SOURCE: SIGNIFICANT CHANGE UP
UROBILINOGEN FLD QL: NEGATIVE — SIGNIFICANT CHANGE UP
WBC # BLD: 18.32 K/UL — HIGH (ref 3.8–10.5)
WBC # BLD: 19.06 K/UL — HIGH (ref 3.8–10.5)
WBC # FLD AUTO: 18.32 K/UL — HIGH (ref 3.8–10.5)
WBC # FLD AUTO: 19.06 K/UL — HIGH (ref 3.8–10.5)

## 2022-06-18 PROCEDURE — 99291 CRITICAL CARE FIRST HOUR: CPT | Mod: GC

## 2022-06-18 PROCEDURE — 74178 CT ABD&PLV WO CNTR FLWD CNTR: CPT | Mod: 26

## 2022-06-18 DEVICE — SPONGE HSTAT SURGICEL 2X14"
Type: IMPLANTABLE DEVICE | Status: NON-FUNCTIONAL
Removed: 2022-06-18

## 2022-06-18 DEVICE — CATH URET 5FR 70CM
Type: IMPLANTABLE DEVICE | Status: NON-FUNCTIONAL
Removed: 2022-06-18

## 2022-06-18 RX ORDER — ONDANSETRON 8 MG/1
4 TABLET, FILM COATED ORAL ONCE
Refills: 0 | Status: DISCONTINUED | OUTPATIENT
Start: 2022-06-18 | End: 2022-06-18

## 2022-06-18 RX ORDER — CEFAZOLIN SODIUM 1 G
VIAL (EA) INJECTION
Refills: 0 | Status: DISCONTINUED | OUTPATIENT
Start: 2022-06-18 | End: 2022-06-19

## 2022-06-18 RX ORDER — ACETAMINOPHEN 500 MG
1000 TABLET ORAL ONCE
Refills: 0 | Status: DISCONTINUED | OUTPATIENT
Start: 2022-06-18 | End: 2022-06-18

## 2022-06-18 RX ORDER — SODIUM CHLORIDE 9 MG/ML
500 INJECTION INTRAMUSCULAR; INTRAVENOUS; SUBCUTANEOUS ONCE
Refills: 0 | Status: COMPLETED | OUTPATIENT
Start: 2022-06-18 | End: 2022-06-18

## 2022-06-18 RX ORDER — CEFAZOLIN SODIUM 1 G
2000 VIAL (EA) INJECTION EVERY 8 HOURS
Refills: 0 | Status: DISCONTINUED | OUTPATIENT
Start: 2022-06-19 | End: 2022-06-19

## 2022-06-18 RX ORDER — HYDROMORPHONE HYDROCHLORIDE 2 MG/ML
0.5 INJECTION INTRAMUSCULAR; INTRAVENOUS; SUBCUTANEOUS
Refills: 0 | Status: DISCONTINUED | OUTPATIENT
Start: 2022-06-18 | End: 2022-06-18

## 2022-06-18 RX ORDER — CEFAZOLIN SODIUM 1 G
2000 VIAL (EA) INJECTION ONCE
Refills: 0 | Status: COMPLETED | OUTPATIENT
Start: 2022-06-18 | End: 2022-06-18

## 2022-06-18 RX ORDER — SODIUM CHLORIDE 9 MG/ML
1000 INJECTION, SOLUTION INTRAVENOUS
Refills: 0 | Status: DISCONTINUED | OUTPATIENT
Start: 2022-06-18 | End: 2022-06-18

## 2022-06-18 RX ADMIN — Medication 30 MILLIGRAM(S): at 11:09

## 2022-06-18 RX ADMIN — Medication 30 MILLIGRAM(S): at 21:44

## 2022-06-18 RX ADMIN — ENOXAPARIN SODIUM 80 MILLIGRAM(S): 100 INJECTION SUBCUTANEOUS at 15:11

## 2022-06-18 RX ADMIN — HYDROMORPHONE HYDROCHLORIDE 0.5 MILLIGRAM(S): 2 INJECTION INTRAMUSCULAR; INTRAVENOUS; SUBCUTANEOUS at 20:59

## 2022-06-18 RX ADMIN — Medication 30 MILLIGRAM(S): at 21:29

## 2022-06-18 RX ADMIN — MORPHINE SULFATE 4 MILLIGRAM(S): 50 CAPSULE, EXTENDED RELEASE ORAL at 08:31

## 2022-06-18 RX ADMIN — Medication 100 MILLIGRAM(S): at 15:11

## 2022-06-18 RX ADMIN — Medication 30 MILLIGRAM(S): at 11:39

## 2022-06-18 RX ADMIN — HYDROMORPHONE HYDROCHLORIDE 0.5 MILLIGRAM(S): 2 INJECTION INTRAMUSCULAR; INTRAVENOUS; SUBCUTANEOUS at 21:14

## 2022-06-18 RX ADMIN — MORPHINE SULFATE 4 MILLIGRAM(S): 50 CAPSULE, EXTENDED RELEASE ORAL at 04:56

## 2022-06-18 RX ADMIN — Medication 30 MILLIGRAM(S): at 05:22

## 2022-06-18 RX ADMIN — SODIUM CHLORIDE 125 MILLILITER(S): 9 INJECTION, SOLUTION INTRAVENOUS at 05:50

## 2022-06-18 RX ADMIN — Medication 30 MILLIGRAM(S): at 05:28

## 2022-06-18 RX ADMIN — Medication 100 MILLIGRAM(S): at 05:23

## 2022-06-18 RX ADMIN — SODIUM CHLORIDE 500 MILLILITER(S): 9 INJECTION INTRAMUSCULAR; INTRAVENOUS; SUBCUTANEOUS at 22:45

## 2022-06-18 RX ADMIN — CEFTRIAXONE 100 MILLIGRAM(S): 500 INJECTION, POWDER, FOR SOLUTION INTRAMUSCULAR; INTRAVENOUS at 15:11

## 2022-06-18 RX ADMIN — Medication 100 MILLIGRAM(S): at 18:47

## 2022-06-18 RX ADMIN — MORPHINE SULFATE 4 MILLIGRAM(S): 50 CAPSULE, EXTENDED RELEASE ORAL at 09:01

## 2022-06-18 NOTE — CHART NOTE - NSCHARTNOTEFT_GEN_A_CORE
[facility] FOLLOW UP NOTE     Pt seen at bedside, states to feel improved with morphine, although still unable to void  bladder scan showed 240  Pt noted to have elevated Cr 1.87 today, 1.4 yesterday (baseline Cr 0.9)   CBC with leukocytosis, although patient afebrile   kendall placed with output     CT urogram ordered to r/o ureteral obstruction   await MRV pelvis   will maintain kendall   f/u cbc/bmp in am   case d/w Dr. Patterson [facility] FOLLOW UP NOTE     Pt seen at bedside, states to feel improved with morphine, although still unable to void  bladder scan showed 240  Pt noted to have elevated Cr 1.87 today, 1.4 yesterday (baseline Cr 0.9)   CBC with leukocytosis, although patient afebrile   kendall placed with output 380  will send UA, urine culture from kendall     CT urogram ordered to r/o ureteral obstruction   await MRV pelvis   will maintain kendall   f/u cbc/bmp in am   case d/w Dr. Patterson

## 2022-06-18 NOTE — CONSULT NOTE ADULT - ASSESSMENT
46y old  Female who presents with a chief complaint of pelvic abscess and fever s/p hysterectomy 6/8/2022, comes in with abdominal pain, was found to have bladder perforation. S/p exploratory laparotomy, repair of bladder perforation, abdominal washout 6/18 became hypotensive. Was given 4L IVF. Patient is being admitted to ICU for septic shock requiring pressors.    #Septic shock 2/2 Urosepsis  #Bladder perf s/p blader repair  #GIOVANI    #NEURO  No issues    #Pulmonary  No issues    #CVS  Septic shock    #GI  No issues    #  Urosepsis and Bladder perf s/p repair     #Endo  No issues    #Prophylactic  On SCd for DVT prophylaxis  46y old  Female who presents with a chief complaint of pelvic abscess and fever s/p hysterectomy 6/8/2022, comes in with abdominal pain, was found to have bladder perforation. S/p exploratory laparotomy, repair of bladder perforation, abdominal washout 6/18 became hypotensive. Was given 4L IVF. Patient is being admitted to ICU for septic shock requiring pressors.    #Septic shock 2/2 Urosepsis  #Bladder perf s/p blader repair  #GIOVANI    #NEURO  No issues    #Pulmonary  No issues    #CVS  Septic shock 2/2 Urosepsis  -p/w abdominal pain, dx with bladder perf, s/p repair on 6/18  -Post op course complicated by septic shock refractory to fluids, received total of 4L intra and post op  -Will do a central line and start on pressors  -Will send in lactate and give more fluids if required  -c/w rocephin  -Monitor Vital signs    #GI  No issues    #  Urosepsis and Bladder perf s/p repair   -c/w rocephin  -Start on pressors once central line is confirmed  -c/w IV hydration  -surgery and GYn is following    GIOVANI  2/2 urosepsis and dehydration  c/w IV hydration  c/w antbx    #Endo  No issues    #Prophylactic  On SCd for DVT prophylaxis

## 2022-06-18 NOTE — CHART NOTE - NSCHARTNOTEFT_GEN_A_CORE
Pt evaluated at bedside with Surgery PA   ICU was consulted, pt hypotensive and tachycardic post operatively     ICU resident present at bedside    Pt resting offers no acute complaints. Reports pain is well controlled. Lora catheter in place draining blood tinged urine. Pt denies CP, SOB, N/V/D/C, fever, chills, dizziness or any other complaints    ICU Vital Signs Last 24 Hrs  T(C): 36.8 (18 Jun 2022 23:00), Max: 37.7 (18 Jun 2022 20:29)  T(F): 98.3 (18 Jun 2022 23:00), Max: 99.9 (18 Jun 2022 20:29)  HR: 130 (18 Jun 2022 23:00) (97 - 132)  BP: 88/56 (18 Jun 2022 23:00) (88/56 - 114/70)  BP(mean): 79 (18 Jun 2022 21:50) (76 - 84)  ABP: --  ABP(mean): --  RR: 18 (18 Jun 2022 23:00) (16 - 30)  SpO2: 98% (18 Jun 2022 23:00) (94% - 98%)      PE  Gen: A&Ox3, NAD, appears pale   Abd: soft, nondistended, dressing C/D/I   WALI drain with ~25cc dark serosanguinous fluid   Pelvic: no vaginal bleeding    I&O's Detail    18 Jun 2022 07:01  -  18 Jun 2022 23:44  --------------------------------------------------------  IN:    Lactated Ringers: 1000 mL    Lactated Ringers Bolus: 1500 mL  Total IN: 2500 mL    OUT:    Blood Loss (mL): 50 mL    Indwelling Catheter - Urethral (mL): 200 mL    Voided (mL): 750 mL  Total OUT: 1000 mL    Total NET: 1500 mL                          12.2   18.32 )-----------( 384      ( 18 Jun 2022 16:53 )             36.1   06-18    137  |  107  |  13  ----------------------------<  94  3.9   |  23  |  1.44<H>    Ca    8.6      18 Jun 2022 16:53    TPro  6.7  /  Alb  2.0<L>  /  TBili  0.5  /  DBili  x   /  AST  19  /  ALT  28  /  AlkPhos  75  06-18    A/p: POD #10 TLH/BS, readmit for CODE SEPSIS in ED, fever 103 and abdominal pain; abscess vs right ovarian vein thrombosis  POD #0 re-op bladder laceration repair  now with hypotension and tachycardia   -pt receiving IV fluid bolus  -ICU resident at bedside  -Dr. Patterson notified  -continue close monitoring

## 2022-06-18 NOTE — CONSULT NOTE ADULT - SUBJECTIVE AND OBJECTIVE BOX
UROLOGY CONSULTATION NOTE  Patient is a 46y old  Female who presents with a chief complaint of pelvic abscess and fever s/p hysterectomy 2022 (2022 08:30)    HPI: 46F s/p laparoscopic hysterectomy with b/l salpingo-oophorectomy with Dr. Patterson on  presents to ED via ambulance c/o severe epigastric pain since this morning at 6am. Fever 103 upon arrival, code sepsis called. Per daughter, patient went to void and developed sudden onset of sharp abdominal pains and needed assistance to get back to bed. +fevers 100.1, nausea/vomiting x1, headache, weakness. Denies chest pains, sob, palpitations, dysuria, hematuria, worsening vaginal bleeding or leg swelling.    Urology consulted for CT urogram findings of bladder perforation with contrast extravasation into posterior abdominal cavity. Pt reports pain that starts in the suprapubic region and goes all over the abdomen. Denies nausea/vomiting at the moment. Reports before the kendall catheter was placed she was unable to urinate secondary to severe pain that she described as a burning/hot sensation.     PAST MEDICAL & SURGICAL HISTORY:  Leiomyoma of uterus  History of bilateral tubal ligation    REVIEW OF SYSTEMS:  please see HPI    MEDICATIONS  (STANDING):  cefTRIAXone   IVPB 1000 milliGRAM(s) IV Intermittent every 24 hours  cefTRIAXone   IVPB      clindamycin IVPB      clindamycin IVPB 600 milliGRAM(s) IV Intermittent every 8 hours  enoxaparin Injectable 80 milliGRAM(s) SubCutaneous every 24 hours  influenza   Vaccine 0.5 milliLiter(s) IntraMuscular once  lactated ringers. 1000 milliLiter(s) (125 mL/Hr) IV Continuous <Continuous>    MEDICATIONS  (PRN):  ketorolac   Injectable 30 milliGRAM(s) IV Push every 6 hours PRN Moderate Pain (4 - 6)  morphine  - Injectable 4 milliGRAM(s) IV Push every 4 hours PRN Severe Pain (7 - 10)  ondansetron Injectable 4 milliGRAM(s) IV Push every 6 hours PRN Nausea and/or Vomiting    Allergies: No Known Allergies    FAMILY HISTORY:  Family history of hypertension (Mother)    Vital Signs Last 24 Hrs  T(C): 37 (2022 15:42), Max: 39.6 (2022 10:43)  T(F): 98.6 (2022 15:42), Max: 103.2 (2022 10:43)  HR: 98 (2022 15:42) (96 - 122)  BP: 108/72 (2022 15:42) (101/68 - 116/76)  RR: 18 (2022 15:42) (18 - 19)  SpO2: 98% (2022 15:42) (96% - 100%)    Physical Exam:   General: in mild distress, appears uncomfortable  Respiratory: non-labored  Abdomen: diffuse tenderness with guarding  : kendall in place with yellow urine with sediment in tubing    LABS:                        12.4   19.06 )-----------( 372      ( 2022 08:47 )             36.3     06-18    139  |  109<H>  |  14  ----------------------------<  102<H>  4.1   |  21<L>  |  1.87<H>    Ca    8.5      2022 08:47    TPro  7.8  /  Alb  2.6<L>  /  TBili  0.4  /  DBili  0.2  /  AST  28  /  ALT  47  /  AlkPhos  95  06-17      Urinalysis Basic - ( 2022 12:53 )  Color: Yellow / Appearance: Slightly Turbid / S.020 / pH: x  Gluc: x / Ketone: Small  / Bili: Negative / Urobili: Negative   Blood: x / Protein: 100 / Nitrite: Negative   Leuk Esterase: Moderate / RBC: >50 /HPF / WBC >50 /HPF   Sq Epi: x / Non Sq Epi: Negative /HPF / Bacteria: Few /HPF    RADIOLOGY & ADDITIONAL STUDIES:   < from: CT Abdomen and Pelvis w/wo IV Cont (22 @ 14:16) >  PROCEDURE:  CT of the Abdomen and Pelvis was performed.  Sagittal and coronal reformats were performed.    FINDINGS:  LOWER CHEST: There are mild bilateral layering pleural effusions with   moderate bilateral lower lobe dependent compressive atelectasis.    LIVER: Scattered hepatic cysts. Otherwise, within normal limits.  BILE DUCTS: Normal caliber.  GALLBLADDER: Within normal limits.  SPLEEN: Within normal limits.  PANCREAS: Within normal limits.  ADRENALS: Within normal limits.  KIDNEYS/URETERS: There is persistent enhancement of the bilateral renal   cortices and excreted contrast in the renal collecting systems and   ureters excretion of the gallbladder. Otherwise, the kidneys are   symmetric in appearance, enhancement, and excretion.    The renal collecting systems and ureters aresymmetric in opacification   and course.    BLADDER: There is a Kendall catheter extending through an 8 mm defect in   the posterior midline wall on image 1:30, with the balloon inflated   within the peritoneal cavity of the pelvis. There is extravasation of   excreted contrast throughout the peritoneal cavity.  REPRODUCTIVE ORGANS: The uterus and ovaries have been removed.    BOWEL: No bowel obstruction.  PERITONEUM: No ascites.  VESSELS: Within normal limits.  RETROPERITONEUM/LYMPH NODES: No lymphadenopathy. Shotty para-aortic and   paracaval lymph nodes.  ABDOMINAL WALL: Within normal limits.  BONES: Within normal limits.    IMPRESSION: 8 mm perforation of the midline posterior bladder wall   through which extends a Kendall catheter. Free extravasation of excreted   contrast into the peritoneal cavity. Results discussed with YAMILEX Hoffman at   time of interpretation.    --- End of Report ---    < end of copied text >

## 2022-06-18 NOTE — PROGRESS NOTE ADULT - SUBJECTIVE AND OBJECTIVE BOX
Patient seen and examined at bedside. Pt was seen while on bed pan, attempting to void, pt appeared in distress due to pain. Pt stated she was having pelvic pain, although had voided overnight. + Ambulation, + flatus; currently NPO. Denies HA, CP, SOB, N/V/D,  no bm; dizziness, palpitations, worsening abdominal pain, worsening vaginal bleeding, or any other concerns.     Vital Signs Last 24 Hrs  T(C): 36.6 (18 Jun 2022 07:13), Max: 37.4 (17 Jun 2022 19:44)  T(F): 97.9 (18 Jun 2022 07:13), Max: 99.3 (17 Jun 2022 19:44)  HR: 97 (18 Jun 2022 05:07) (96 - 105)  BP: 97/53 (18 Jun 2022 05:07) (93/57 - 108/72)  BP(mean): 69 (17 Jun 2022 22:04) (69 - 69)  RR: 18 (18 Jun 2022 05:07) (18 - 18)  SpO2: 97% (18 Jun 2022 05:07) (96% - 100%)    Gen: A&O x 3, NAD  Chest: CTA B/L  Cardiac: S1,S2  RRR  Breast: Soft, nontender, nonengorged  Abdomen: +BS; soft; Nontender, nondistended, incision c/d/i   Gyn: no vaginal bleeding   Extremities: Nontender, no worsening edema                          12.4   19.06 )-----------( 372      ( 18 Jun 2022 08:47 )             36.3     06-18    139  |  109<H>  |  14  ----------------------------<  102<H>  4.1   |  21<L>  |  1.87<H>    Ca    8.5      18 Jun 2022 08:47    TPro  7.8  /  Alb  2.6<L>  /  TBili  0.4  /  DBili  0.2  /  AST  28  /  ALT  47  /  AlkPhos  95  06-17      A/P: 46 year old POD#10 s/p TLH/BS female with postop pelvic abscess and suspected ovarian vein thrombosis  -S/p vanco in ED, continue rocephin/clinda  -pain mgmt with morphine/toradol and tylenol as needed  -await repeat cbc/bmp in am   -lovenox 80mg therapeutic dose  -f/u blood culture / urine culture   -f/u Pelvic MRV  -will place kendall catheter   -case d/w Dr. Patterson

## 2022-06-18 NOTE — PROGRESS NOTE ADULT - ASSESSMENT
A/P: 46 year old POD#10 s/p TLH/BS female with postop pelvic abscess and suspected ovarian vein thrombosis

## 2022-06-18 NOTE — PROGRESS NOTE ADULT - PROBLEM SELECTOR PLAN 1
-S/p vanco in ED, continue rocephin/clinda  -pain mgmt with morphine/toradol and tylenol as needed  -await repeat cbc/bmp in am   -lovenox 80mg therapeutic dose  -f/u blood culture / urine culture   -f/u Pelvic MRV  -will place kendall catheter   -case d/w Dr. Patterson

## 2022-06-18 NOTE — PROGRESS NOTE ADULT - SUBJECTIVE AND OBJECTIVE BOX
Pt noted to have elevated Cr 1.87 today and wbc 19.  Kendall was placed and approx 380cc dark yellow urine obtained.  Pt reported that  she felt better.  In light of continued increase in Cr decision made to obtain ct abd/pelvis with urogram.  Read at this time showed 8mm defect in posterior bladder through which extends a kendall catheter with extravasation of excreted contrast into the abdominal cavity.  Surgery PA contacted who in turn spoke to Dr Mitchell (urologist on call).  Pt to be taken to OR today for exploratory laparotomy and bladder/any other repairs as indicated.  Pt is NPO. Dr Patterson has been made aware of above and is en route. Desires to speak to pt about above directly.

## 2022-06-18 NOTE — CONSULT NOTE ADULT - ASSESSMENT
46F s/p laparoscopic hysterectomy with b/l RADHA POD#10, c/b bladder perforation   peritonitic; leukocytosis of 19k, tachycardia, hypotension, febrile    Urgent OR for exploratory laparotomy, repair of bladder perforation, abdominal washout with Dr. Mitchell in the OR; OR on-call team notified.  Plan discussed with GYN YAMILEX Hoffman and Attending Dr. Jose; original operative surgeon notified and coming in.  Keep NPO. PREOP labs ordered.  Case and plan discussed with Dr. Mitchell.

## 2022-06-18 NOTE — CHART NOTE - NSCHARTNOTEFT_GEN_A_CORE
[facility] EVENT NOTE     called by Radiologist and CT results [facility] EVENT NOTE     called by Radiologist and CT results    < from: CT Abdomen and Pelvis w/wo IV Cont (06.18.22 @ 14:16) >    INTERPRETATION:  CLINICAL INFORMATION: Postop day 10 hysterectomy. Pelvic   abscess. Fever.    COMPARISON: Contrast-enhanced CT of the abdomen and pelvis June 17, 2022.    CONTRAST/COMPLICATIONS:  IV Contrast: Omnipaque 350  90 cc administered   10 cc discarded  Oral Contrast: NONE  Complications: None reported at time of study completion    PROCEDURE:  CT of the Abdomen and Pelvis was performed.  Sagittal and coronal reformats were performed.    FINDINGS:  LOWER CHEST: There are mild bilateral layering pleural effusions with   moderate bilateral lower lobe dependent compressive atelectasis.    LIVER: Scattered hepatic cysts. Otherwise, within normal limits.  BILE DUCTS: Normal caliber.  GALLBLADDER: Within normal limits.  SPLEEN: Within normal limits.  PANCREAS: Within normal limits.  ADRENALS: Within normal limits.  KIDNEYS/URETERS: There is persistent enhancement of the bilateral renal   cortices and excreted contrast in the renal collecting systems and   ureters excretion of the gallbladder. Otherwise, the kidneys are   symmetric in appearance, enhancement, and excretion.    The renal collecting systems and ureters aresymmetric in opacification   and course.    BLADDER: There is a Kendall catheter extending through an 8 mm defect in   the posterior midline wall on image 1:30, with the balloon inflated   within the peritoneal cavity of the pelvis. There is extravasation of   excreted contrast throughout the peritoneal cavity.  REPRODUCTIVE ORGANS: The uterus and ovaries have been removed.    BOWEL: No bowel obstruction.  PERITONEUM: No ascites.  VESSELS: Within normal limits.  RETROPERITONEUM/LYMPH NODES: No lymphadenopathy. Shotty para-aortic and   paracaval lymph nodes.  ABDOMINAL WALL: Within normal limits.  BONES: Within normal limits.    IMPRESSION: 8 mm perforation of the midline posterior bladder wall   through which extends a Kendall catheter. Free extravasation of excreted   contrast into the peritoneal cavity. Results discussed with YAMILEX Hoffman at   time of interpretation.    --- End of Report ---    < end of copied text >    case d/w Dr. Patterson and Dr. Jose   Urology Dr. Mitchell called, left message   Surgery PA called and consult requested    Pt was seen and examined with Dr. Brown and YAMILEX Rust present at bedside   pt states she feels better after kendall placement  /64   kendall draining cloudy yellow/brown fluid with sediment   will order stat CBC/CMP   will continue to monitor closely

## 2022-06-18 NOTE — CONSULT NOTE ADULT - SUBJECTIVE AND OBJECTIVE BOX
Patient is a 46y old  Female who presents with a chief complaint of pelvic abscess and fever s/p hysterectomy 2022 (2022 16:43)      HPI:  45yo female P2, s/p TLH/BS on  with Dr. Patterson  presents to ED via ambulance c/o severe epigastric pain since this morning at 6am. Fever 103 upon arrival, s/p code sepsis.  Per daughter, patient went to void and developed sudden onset of sharp abdominal pains and needed assistance to get back to bed. +fevers 100.1, nausea/vomiting x1, headache, weakness.  Denies chest pains, sob, palpitations, dysuria, hematuria, worsening vaginal bleeding or leg swelling.    Interval h/o:  Patient was found to have bladder rupture, was taken to OR for Complex repair of laceration of bladder. She was found to be hypotensive post op. She received total of 3500cc fluids in OR and another 1l is being given. ICU was consulted for septic shock. No fever, Complains of pain at surgery site      PMH: denies  PSH: BTL, TLH/BS on , uncomplicated with Leonardo Jordan and Adonis Jordan  POBhx:  x2 95, 2002  Pgyn: s/p TLH/BS for chronic pelvic pain and fibroids; denies STDs, abnormal pap smears  Meds: tylenol, motrin as needed for pain  Allergies: NKDA  Social: denies smoking, etoh or drug use        Labs:                         13.5   8.04  )-----------( 373      ( 2022 11:15 )             39.1         139  |  105  |  12  ----------------------------<  132<H>  3.8   |  25  |  1.40<H>    Ca    9.1      2022 11:15    TPro  7.8  /  Alb  2.6<L>  /  TBili  0.4  /  DBili  0.2  /  AST  28  /  ALT  47  /  AlkPhos  95  -    CT Abdomen and Pelvis w/ Oral Cont and w/ IV Cont (22 @ 14:06) >  PROCEDURE DATE:  2022    INTERPRETATION:  CLINICAL INFORMATION: Epigastric pain. Status post   hysterectomy on 22.  COMPARISON: None.  CONTRAST/COMPLICATIONS:  IV Contrast: Omnipaque 350  90 cc administered   10 cc discarded  Oral Contrast: NONE  Complications: None reported at time of study completion  PROCEDURE:  CT of the Abdomen and Pelvis was performed.  Sagittal and coronal reformats were performed.    FINDINGS:  LOWER CHEST: Mild bibasilar atelectasis. Small hiatal hernia containing   oral contrast.  LIVER: Scattered cysts and additional hypodensities too small to   characterize.  BILE DUCTS: Normal caliber.  GALLBLADDER: Within normal limits.  SPLEEN: Within normal limits.  PANCREAS: Within normal limits.  ADRENALS: Within normal limits.  KIDNEYS/URETERS: Within normal limits.  BLADDER: Air in the bladder likely related to a recent Kendall catheter.  REPRODUCTIVE ORGANS: Hysterectomy.  BOWEL: No bowel obstruction. Appendix is normal. Wall thickening of the   descending and rectosigmoid colon may be related to underdistention. Mild   wall thickening of small bowel loops in the right and lower abdomen   (example 2, 82 and 4, 26).  PERITONEUM: Small to moderate ascites with peripheral enhancement. Rim   enhancing air and fluid collection in the pelvis measuring up to 7.9  x   3.1 x 4.7 cm suspicious for abscess (2, 108). Smaller loculated area of   rim enhancing fluid extendsto the right upper pelvis measuring up to 1.6   x 1.6 cm (2, 95).  VESSELS: Question filling defect within the right ovarian vein distally   (4, 41).  RETROPERITONEUM/LYMPH NODES: No lymphadenopathy.  ABDOMINAL WALL: Small fat-containing bilateral hernia.  BONES: Within normal limits.  IMPRESSION:  1. Rim-enhancing air and fluid collection in the pelvis measuring up to   7.9 x 3.1 x 4.7 cm, suspicious for abscess. Small to moderate ascites   with peripheral enhancement.  2. Suspected enteritis. Mild wall thickening of the descending and   rectosigmoid colon may be related to underdistention versus colitis.  3. Question central filling defect within the right ovarian vein distally   versus interdigitating fluid between branches. MRV can be obtained to   further evaluate for ovarian vein thrombosis.    d/w Dr. Patterson (2022 15:50)      Allergies    No Known Allergies    Intolerances        MEDICATIONS  (STANDING):  ceFAZolin   IVPB      cefTRIAXone   IVPB      cefTRIAXone   IVPB 1000 milliGRAM(s) IV Intermittent every 24 hours  influenza   Vaccine 0.5 milliLiter(s) IntraMuscular once  lactated ringers. 1000 milliLiter(s) (125 mL/Hr) IV Continuous <Continuous>    MEDICATIONS  (PRN):  ketorolac   Injectable 30 milliGRAM(s) IV Push every 6 hours PRN Moderate Pain (4 - 6)  morphine  - Injectable 4 milliGRAM(s) IV Push every 4 hours PRN Severe Pain (7 - 10)  ondansetron Injectable 4 milliGRAM(s) IV Push every 6 hours PRN Nausea and/or Vomiting      Daily     Daily     Drug Dosing Weight  Height (cm): 157.5 (2022 09:54)  Weight (kg): 53.1 (2022 09:54)  BMI (kg/m2): 21.4 (2022 09:54)  BSA (m2): 1.52 (2022 09:54)    PAST MEDICAL & SURGICAL HISTORY:  Leiomyoma of uterus      History of bilateral tubal ligation          FAMILY HISTORY:  Family history of hypertension (Mother)        SOCIAL HISTORY:    [employer] DIRECTIVEs          ICU Vital Signs Last 24 Hrs  T(C): 36.8 (2022 23:00), Max: 37.7 (2022 20:29)  T(F): 98.3 (2022 23:00), Max: 99.9 (2022 20:29)  HR: 130 (2022 23:00) (97 - 132)  BP: 88/56 (2022 23:00) (88/56 - 114/70)  BP(mean): 79 (2022 21:50) (76 - 84)  ABP: --  ABP(mean): --  RR: 18 (2022 23:00) (16 - 30)  SpO2: 98% (2022 23:00) (94% - 98%)          I&O's Detail    2022 07:01  -  2022 23:49  --------------------------------------------------------  IN:    Lactated Ringers: 1000 mL    Lactated Ringers Bolus: 1500 mL  Total IN: 2500 mL    OUT:    Blood Loss (mL): 50 mL    Indwelling Catheter - Urethral (mL): 200 mL    Voided (mL): 750 mL  Total OUT: 1000 mL    Total NET: 1500 mL          PHYSICAL EXAM:    GENERAL: Mildly drowsy  HEAD:  Atraumatic, Normocephalic  EYES: EOMI, PERRLA, conjunctiva and sclera clear  ENMT: No tonsillar erythema, exudates, or enlargement; Moist mucous membranes, Good dentition, No lesions  NECK: Supple, No JVD, Normal thyroid  NERVOUS SYSTEM:  Alert & Oriented X3,  CHEST/LUNG: Clear to percussion bilaterally; No rales, rhonchi, wheezing, or rubs  HEART: tachycardic  ABDOMEN: Surgery site dressed, has a drain in, has kendall placed  EXTREMITIES:  2+ Peripheral Pulses, No clubbing, cyanosis, or edema  LYMPH: No lymphadenopathy noted  SKIN: No rashes or lesions    LABS:  CBC Full  -  ( 2022 16:53 )  WBC Count : 18.32 K/uL  RBC Count : 3.90 M/uL  Hemoglobin : 12.2 g/dL  Hematocrit : 36.1 %  Platelet Count - Automated : 384 K/uL  Mean Cell Volume : 92.6 fl  Mean Cell Hemoglobin : 31.3 pg  Mean Cell Hemoglobin Concentration : 33.8 gm/dL  Auto Neutrophil # : x  Auto Lymphocyte # : x  Auto Monocyte # : x  Auto Eosinophil # : x  Auto Basophil # : x  Auto Neutrophil % : x  Auto Lymphocyte % : x  Auto Monocyte % : x  Auto Eosinophil % : x  Auto Basophil % : x    06-18    137  |  107  |  13  ----------------------------<  94  3.9   |  23  |  1.44<H>    Ca    8.6      2022 16:53    TPro  6.7  /  Alb  2.0<L>  /  TBili  0.5  /  DBili  x   /  AST  19  /  ALT  28  /  AlkPhos  75  06-18    CAPILLARY BLOOD GLUCOSE          Urinalysis Basic - ( 2022 12:53 )    Color: Yellow / Appearance: Slightly Turbid / S.020 / pH: x  Gluc: x / Ketone: Small  / Bili: Negative / Urobili: Negative   Blood: x / Protein: 100 / Nitrite: Negative   Leuk Esterase: Moderate / RBC: >50 /HPF / WBC >50 /HPF   Sq Epi: x / Non Sq Epi: Negative /HPF / Bacteria: Few /HPF          Culture Results:   No growth to date. ( @ 18:54)  Culture Results:   No growth to date. ( @ 18:45)  Culture Results:   No growth ( @ 18:41)      EKG:    ECHO, US:    RADIOLOGY:    CRITICAL CARE TIME SPENT:

## 2022-06-18 NOTE — CONSULT NOTE ADULT - ATTENDING COMMENTS
Patient is a 47 y/o female with h/o hysterectomy 6-8-22. She presented to the ED yesterday with a chief complaint of epigastric pain. CT of the abdomen showed a fluid collection 8x3x5 cm suspicious for abscess. The bladder was determined to have an 8mm laceration at the posterior aspect and she went to the OR today for repair by urology. The patient now has tachycardia and hypotension ( systolic BP 88 ) and will be transferred to ICU for management of septic shock. She was started on antibiotics and these will be continued. will obtain consent for central venous access to properly resuscitate and administer vasopressors as needed. plan to monitor blood lactate. The patient was febrile to 103 on presentation. The hysterectomy was done to treat a leiomyoma of the uterus. Dx- pelvic abscess, septic shock. I reviewed all laboratory and roentgenographic data and any previous medical record from [facility] that existed. I also discussed the case with the ED attending or referring physician.

## 2022-06-19 LAB
-  BACTEROIDES FRAGILIS: SIGNIFICANT CHANGE UP
ALBUMIN SERPL ELPH-MCNC: 1.1 G/DL — LOW (ref 3.5–5)
ALBUMIN SERPL ELPH-MCNC: 1.4 G/DL — LOW (ref 3.5–5)
ALP SERPL-CCNC: 55 U/L — SIGNIFICANT CHANGE UP (ref 40–120)
ALP SERPL-CCNC: 72 U/L — SIGNIFICANT CHANGE UP (ref 40–120)
ALT FLD-CCNC: 15 U/L DA — SIGNIFICANT CHANGE UP (ref 10–60)
ALT FLD-CCNC: 21 U/L DA — SIGNIFICANT CHANGE UP (ref 10–60)
ANION GAP SERPL CALC-SCNC: 7 MMOL/L — SIGNIFICANT CHANGE UP (ref 5–17)
ANION GAP SERPL CALC-SCNC: 8 MMOL/L — SIGNIFICANT CHANGE UP (ref 5–17)
ANION GAP SERPL CALC-SCNC: 9 MMOL/L — SIGNIFICANT CHANGE UP (ref 5–17)
ANISOCYTOSIS BLD QL: SLIGHT — SIGNIFICANT CHANGE UP
ANISOCYTOSIS BLD QL: SLIGHT — SIGNIFICANT CHANGE UP
AST SERPL-CCNC: 21 U/L — SIGNIFICANT CHANGE UP (ref 10–40)
AST SERPL-CCNC: 27 U/L — SIGNIFICANT CHANGE UP (ref 10–40)
BASE EXCESS BLDV CALC-SCNC: -4.1 MMOL/L — SIGNIFICANT CHANGE UP
BASE EXCESS BLDV CALC-SCNC: -7 MMOL/L — SIGNIFICANT CHANGE UP
BASOPHILS # BLD AUTO: 0 K/UL — SIGNIFICANT CHANGE UP (ref 0–0.2)
BASOPHILS NFR BLD AUTO: 0 % — SIGNIFICANT CHANGE UP (ref 0–2)
BILIRUB SERPL-MCNC: 0.5 MG/DL — SIGNIFICANT CHANGE UP (ref 0.2–1.2)
BILIRUB SERPL-MCNC: 0.9 MG/DL — SIGNIFICANT CHANGE UP (ref 0.2–1.2)
BLOOD GAS COMMENTS, VENOUS: SIGNIFICANT CHANGE UP
BLOOD GAS COMMENTS, VENOUS: SIGNIFICANT CHANGE UP
BUN SERPL-MCNC: 10 MG/DL — SIGNIFICANT CHANGE UP (ref 7–18)
BUN SERPL-MCNC: 12 MG/DL — SIGNIFICANT CHANGE UP (ref 7–18)
BUN SERPL-MCNC: 14 MG/DL — SIGNIFICANT CHANGE UP (ref 7–18)
CALCIUM SERPL-MCNC: 6.8 MG/DL — LOW (ref 8.4–10.5)
CALCIUM SERPL-MCNC: 7.1 MG/DL — LOW (ref 8.4–10.5)
CALCIUM SERPL-MCNC: 7.5 MG/DL — LOW (ref 8.4–10.5)
CHLORIDE SERPL-SCNC: 111 MMOL/L — HIGH (ref 96–108)
CHLORIDE SERPL-SCNC: 112 MMOL/L — HIGH (ref 96–108)
CHLORIDE SERPL-SCNC: 112 MMOL/L — HIGH (ref 96–108)
CO2 SERPL-SCNC: 18 MMOL/L — LOW (ref 22–31)
CO2 SERPL-SCNC: 19 MMOL/L — LOW (ref 22–31)
CO2 SERPL-SCNC: 21 MMOL/L — LOW (ref 22–31)
CREAT SERPL-MCNC: 0.64 MG/DL — SIGNIFICANT CHANGE UP (ref 0.5–1.3)
CREAT SERPL-MCNC: 0.65 MG/DL — SIGNIFICANT CHANGE UP (ref 0.5–1.3)
CREAT SERPL-MCNC: 0.86 MG/DL — SIGNIFICANT CHANGE UP (ref 0.5–1.3)
CULTURE RESULTS: NO GROWTH — SIGNIFICANT CHANGE UP
EGFR: 109 ML/MIN/1.73M2 — SIGNIFICANT CHANGE UP
EGFR: 110 ML/MIN/1.73M2 — SIGNIFICANT CHANGE UP
EGFR: 84 ML/MIN/1.73M2 — SIGNIFICANT CHANGE UP
EOSINOPHIL # BLD AUTO: 0 K/UL — SIGNIFICANT CHANGE UP (ref 0–0.5)
EOSINOPHIL NFR BLD AUTO: 0 % — SIGNIFICANT CHANGE UP (ref 0–6)
GLUCOSE SERPL-MCNC: 127 MG/DL — HIGH (ref 70–99)
GLUCOSE SERPL-MCNC: 138 MG/DL — HIGH (ref 70–99)
GLUCOSE SERPL-MCNC: 143 MG/DL — HIGH (ref 70–99)
GRAM STN FLD: SIGNIFICANT CHANGE UP
HCO3 BLDV-SCNC: 19 MMOL/L — LOW (ref 22–29)
HCO3 BLDV-SCNC: 20 MMOL/L — LOW (ref 22–29)
HCT VFR BLD CALC: 19.9 % — CRITICAL LOW (ref 34.5–45)
HCT VFR BLD CALC: 19.9 % — CRITICAL LOW (ref 34.5–45)
HCT VFR BLD CALC: 20.1 % — CRITICAL LOW (ref 34.5–45)
HCT VFR BLD CALC: 20.7 % — CRITICAL LOW (ref 34.5–45)
HCT VFR BLD CALC: 24.4 % — LOW (ref 34.5–45)
HCT VFR BLD CALC: 27.9 % — LOW (ref 34.5–45)
HCT VFR BLD CALC: 31.1 % — LOW (ref 34.5–45)
HGB BLD-MCNC: 10.5 G/DL — LOW (ref 11.5–15.5)
HGB BLD-MCNC: 6.8 G/DL — CRITICAL LOW (ref 11.5–15.5)
HGB BLD-MCNC: 7 G/DL — CRITICAL LOW (ref 11.5–15.5)
HGB BLD-MCNC: 7 G/DL — CRITICAL LOW (ref 11.5–15.5)
HGB BLD-MCNC: 7.1 G/DL — LOW (ref 11.5–15.5)
HGB BLD-MCNC: 8.6 G/DL — LOW (ref 11.5–15.5)
HGB BLD-MCNC: 9.7 G/DL — LOW (ref 11.5–15.5)
HYPOCHROMIA BLD QL: SLIGHT — SIGNIFICANT CHANGE UP
LACTATE SERPL-SCNC: 1.8 MMOL/L — SIGNIFICANT CHANGE UP (ref 0.7–2)
LG PLATELETS BLD QL AUTO: SLIGHT — SIGNIFICANT CHANGE UP
LYMPHOCYTES # BLD AUTO: 1.52 K/UL — SIGNIFICANT CHANGE UP (ref 1–3.3)
LYMPHOCYTES # BLD AUTO: 6 % — LOW (ref 13–44)
MAGNESIUM SERPL-MCNC: 1.5 MG/DL — LOW (ref 1.6–2.6)
MAGNESIUM SERPL-MCNC: 3.2 MG/DL — HIGH (ref 1.6–2.6)
MANUAL SMEAR VERIFICATION: SIGNIFICANT CHANGE UP
MCHC RBC-ENTMCNC: 29.3 PG — SIGNIFICANT CHANGE UP (ref 27–34)
MCHC RBC-ENTMCNC: 29.6 PG — SIGNIFICANT CHANGE UP (ref 27–34)
MCHC RBC-ENTMCNC: 29.7 PG — SIGNIFICANT CHANGE UP (ref 27–34)
MCHC RBC-ENTMCNC: 31.6 PG — SIGNIFICANT CHANGE UP (ref 27–34)
MCHC RBC-ENTMCNC: 32.3 PG — SIGNIFICANT CHANGE UP (ref 27–34)
MCHC RBC-ENTMCNC: 33.8 GM/DL — SIGNIFICANT CHANGE UP (ref 32–36)
MCHC RBC-ENTMCNC: 33.8 GM/DL — SIGNIFICANT CHANGE UP (ref 32–36)
MCHC RBC-ENTMCNC: 34.2 GM/DL — SIGNIFICANT CHANGE UP (ref 32–36)
MCHC RBC-ENTMCNC: 34.8 GM/DL — SIGNIFICANT CHANGE UP (ref 32–36)
MCHC RBC-ENTMCNC: 35.2 GM/DL — SIGNIFICANT CHANGE UP (ref 32–36)
MCHC RBC-ENTMCNC: 35.2 GM/DL — SIGNIFICANT CHANGE UP (ref 32–36)
MCHC RBC-ENTMCNC: 35.3 GM/DL — SIGNIFICANT CHANGE UP (ref 32–36)
MCV RBC AUTO: 83.1 FL — SIGNIFICANT CHANGE UP (ref 80–100)
MCV RBC AUTO: 83.3 FL — SIGNIFICANT CHANGE UP (ref 80–100)
MCV RBC AUTO: 83.8 FL — SIGNIFICANT CHANGE UP (ref 80–100)
MCV RBC AUTO: 85.3 FL — SIGNIFICANT CHANGE UP (ref 80–100)
MCV RBC AUTO: 86.9 FL — SIGNIFICANT CHANGE UP (ref 80–100)
MCV RBC AUTO: 92.6 FL — SIGNIFICANT CHANGE UP (ref 80–100)
MCV RBC AUTO: 95.4 FL — SIGNIFICANT CHANGE UP (ref 80–100)
METHOD TYPE: SIGNIFICANT CHANGE UP
MICROCYTES BLD QL: SLIGHT — SIGNIFICANT CHANGE UP
MICROCYTES BLD QL: SLIGHT — SIGNIFICANT CHANGE UP
MONOCYTES # BLD AUTO: 0.76 K/UL — SIGNIFICANT CHANGE UP (ref 0–0.9)
MONOCYTES NFR BLD AUTO: 3 % — SIGNIFICANT CHANGE UP (ref 2–14)
NEUTROPHILS # BLD AUTO: 23.12 K/UL — HIGH (ref 1.8–7.4)
NEUTROPHILS NFR BLD AUTO: 91 % — HIGH (ref 43–77)
NEUTS BAND # BLD: 4 % — SIGNIFICANT CHANGE UP (ref 0–8)
NRBC # BLD: 0 /100 WBCS — SIGNIFICANT CHANGE UP (ref 0–0)
NRBC # BLD: 0 /100 — SIGNIFICANT CHANGE UP (ref 0–0)
NRBC # BLD: 0 /100 — SIGNIFICANT CHANGE UP (ref 0–0)
PCO2 BLDV: 33 MMHG — LOW (ref 39–42)
PCO2 BLDV: 37 MMHG — LOW (ref 39–42)
PH BLDV: 7.31 — LOW (ref 7.32–7.43)
PH BLDV: 7.39 — SIGNIFICANT CHANGE UP (ref 7.32–7.43)
PHOSPHATE SERPL-MCNC: 1.9 MG/DL — LOW (ref 2.5–4.5)
PHOSPHATE SERPL-MCNC: 3 MG/DL — SIGNIFICANT CHANGE UP (ref 2.5–4.5)
PLAT MORPH BLD: NORMAL — SIGNIFICANT CHANGE UP
PLATELET # BLD AUTO: 268 K/UL — SIGNIFICANT CHANGE UP (ref 150–400)
PLATELET # BLD AUTO: 273 K/UL — SIGNIFICANT CHANGE UP (ref 150–400)
PLATELET # BLD AUTO: 292 K/UL — SIGNIFICANT CHANGE UP (ref 150–400)
PLATELET # BLD AUTO: 297 K/UL — SIGNIFICANT CHANGE UP (ref 150–400)
PLATELET # BLD AUTO: 317 K/UL — SIGNIFICANT CHANGE UP (ref 150–400)
PLATELET # BLD AUTO: 396 K/UL — SIGNIFICANT CHANGE UP (ref 150–400)
PLATELET # BLD AUTO: 452 K/UL — HIGH (ref 150–400)
PLATELET COUNT - ESTIMATE: NORMAL — SIGNIFICANT CHANGE UP
PO2 BLDV: 43 MMHG — SIGNIFICANT CHANGE UP
PO2 BLDV: 47 MMHG — SIGNIFICANT CHANGE UP
POIKILOCYTOSIS BLD QL AUTO: SLIGHT — SIGNIFICANT CHANGE UP
POIKILOCYTOSIS BLD QL AUTO: SLIGHT — SIGNIFICANT CHANGE UP
POLYCHROMASIA BLD QL SMEAR: SLIGHT — SIGNIFICANT CHANGE UP
POTASSIUM SERPL-MCNC: 4 MMOL/L — SIGNIFICANT CHANGE UP (ref 3.5–5.3)
POTASSIUM SERPL-MCNC: 4.1 MMOL/L — SIGNIFICANT CHANGE UP (ref 3.5–5.3)
POTASSIUM SERPL-MCNC: 4.4 MMOL/L — SIGNIFICANT CHANGE UP (ref 3.5–5.3)
POTASSIUM SERPL-SCNC: 4 MMOL/L — SIGNIFICANT CHANGE UP (ref 3.5–5.3)
POTASSIUM SERPL-SCNC: 4.1 MMOL/L — SIGNIFICANT CHANGE UP (ref 3.5–5.3)
POTASSIUM SERPL-SCNC: 4.4 MMOL/L — SIGNIFICANT CHANGE UP (ref 3.5–5.3)
PROT SERPL-MCNC: 4.1 G/DL — LOW (ref 6–8.3)
PROT SERPL-MCNC: 4.9 G/DL — LOW (ref 6–8.3)
RBC # BLD: 2.15 M/UL — LOW (ref 3.8–5.2)
RBC # BLD: 2.17 M/UL — LOW (ref 3.8–5.2)
RBC # BLD: 2.39 M/UL — LOW (ref 3.8–5.2)
RBC # BLD: 2.42 M/UL — LOW (ref 3.8–5.2)
RBC # BLD: 2.91 M/UL — LOW (ref 3.8–5.2)
RBC # BLD: 3.27 M/UL — LOW (ref 3.8–5.2)
RBC # BLD: 3.58 M/UL — LOW (ref 3.8–5.2)
RBC # FLD: 12.7 % — SIGNIFICANT CHANGE UP (ref 10.3–14.5)
RBC # FLD: 13 % — SIGNIFICANT CHANGE UP (ref 10.3–14.5)
RBC # FLD: 14.8 % — HIGH (ref 10.3–14.5)
RBC # FLD: 15.1 % — HIGH (ref 10.3–14.5)
RBC # FLD: 16.1 % — HIGH (ref 10.3–14.5)
RBC # FLD: 16.3 % — HIGH (ref 10.3–14.5)
RBC # FLD: 16.5 % — HIGH (ref 10.3–14.5)
RBC BLD AUTO: ABNORMAL
SAO2 % BLDV: 78.5 % — SIGNIFICANT CHANGE UP
SAO2 % BLDV: 84.1 % — SIGNIFICANT CHANGE UP
SODIUM SERPL-SCNC: 139 MMOL/L — SIGNIFICANT CHANGE UP (ref 135–145)
SPECIMEN SOURCE: SIGNIFICANT CHANGE UP
SPHEROCYTES BLD QL SMEAR: SLIGHT — SIGNIFICANT CHANGE UP
WBC # BLD: 25.41 K/UL — HIGH (ref 3.8–10.5)
WBC # BLD: 27 K/UL — HIGH (ref 3.8–10.5)
WBC # BLD: 34.25 K/UL — HIGH (ref 3.8–10.5)
WBC # BLD: 34.66 K/UL — HIGH (ref 3.8–10.5)
WBC # BLD: 38.19 K/UL — HIGH (ref 3.8–10.5)
WBC # BLD: 39.81 K/UL — HIGH (ref 3.8–10.5)
WBC # BLD: 39.87 K/UL — HIGH (ref 3.8–10.5)
WBC # FLD AUTO: 25.41 K/UL — HIGH (ref 3.8–10.5)
WBC # FLD AUTO: 27 K/UL — HIGH (ref 3.8–10.5)
WBC # FLD AUTO: 34.25 K/UL — HIGH (ref 3.8–10.5)
WBC # FLD AUTO: 34.66 K/UL — HIGH (ref 3.8–10.5)
WBC # FLD AUTO: 38.19 K/UL — HIGH (ref 3.8–10.5)
WBC # FLD AUTO: 39.81 K/UL — HIGH (ref 3.8–10.5)
WBC # FLD AUTO: 39.87 K/UL — HIGH (ref 3.8–10.5)

## 2022-06-19 PROCEDURE — 71045 X-RAY EXAM CHEST 1 VIEW: CPT | Mod: 26

## 2022-06-19 PROCEDURE — 36556 INSERT NON-TUNNEL CV CATH: CPT | Mod: GC

## 2022-06-19 RX ORDER — MAGNESIUM SULFATE 500 MG/ML
2 VIAL (ML) INJECTION
Refills: 0 | Status: COMPLETED | OUTPATIENT
Start: 2022-06-19 | End: 2022-06-19

## 2022-06-19 RX ORDER — PHENYLEPHRINE HYDROCHLORIDE 10 MG/ML
0.2 INJECTION INTRAVENOUS
Qty: 160 | Refills: 0 | Status: DISCONTINUED | OUTPATIENT
Start: 2022-06-19 | End: 2022-06-19

## 2022-06-19 RX ORDER — NOREPINEPHRINE BITARTRATE/D5W 8 MG/250ML
0.05 PLASTIC BAG, INJECTION (ML) INTRAVENOUS
Qty: 16 | Refills: 0 | Status: DISCONTINUED | OUTPATIENT
Start: 2022-06-19 | End: 2022-06-19

## 2022-06-19 RX ORDER — METRONIDAZOLE 500 MG
500 TABLET ORAL EVERY 8 HOURS
Refills: 0 | Status: DISCONTINUED | OUTPATIENT
Start: 2022-06-19 | End: 2022-06-25

## 2022-06-19 RX ORDER — SODIUM CHLORIDE 9 MG/ML
500 INJECTION INTRAMUSCULAR; INTRAVENOUS; SUBCUTANEOUS ONCE
Refills: 0 | Status: COMPLETED | OUTPATIENT
Start: 2022-06-19 | End: 2022-06-19

## 2022-06-19 RX ORDER — METRONIDAZOLE 500 MG
TABLET ORAL
Refills: 0 | Status: DISCONTINUED | OUTPATIENT
Start: 2022-06-19 | End: 2022-06-25

## 2022-06-19 RX ORDER — SODIUM CHLORIDE 9 MG/ML
1000 INJECTION, SOLUTION INTRAVENOUS ONCE
Refills: 0 | Status: COMPLETED | OUTPATIENT
Start: 2022-06-19 | End: 2022-06-19

## 2022-06-19 RX ORDER — SENNA PLUS 8.6 MG/1
2 TABLET ORAL AT BEDTIME
Refills: 0 | Status: DISCONTINUED | OUTPATIENT
Start: 2022-06-19 | End: 2022-06-21

## 2022-06-19 RX ORDER — ACETAMINOPHEN 500 MG
1000 TABLET ORAL EVERY 8 HOURS
Refills: 0 | Status: DISCONTINUED | OUTPATIENT
Start: 2022-06-19 | End: 2022-06-25

## 2022-06-19 RX ORDER — SODIUM CHLORIDE 9 MG/ML
500 INJECTION, SOLUTION INTRAVENOUS ONCE
Refills: 0 | Status: COMPLETED | OUTPATIENT
Start: 2022-06-19 | End: 2022-06-19

## 2022-06-19 RX ORDER — SODIUM CHLORIDE 9 MG/ML
1000 INJECTION, SOLUTION INTRAVENOUS
Refills: 0 | Status: DISCONTINUED | OUTPATIENT
Start: 2022-06-19 | End: 2022-06-20

## 2022-06-19 RX ORDER — SODIUM CHLORIDE 9 MG/ML
10 INJECTION INTRAMUSCULAR; INTRAVENOUS; SUBCUTANEOUS
Refills: 0 | Status: DISCONTINUED | OUTPATIENT
Start: 2022-06-19 | End: 2022-06-25

## 2022-06-19 RX ORDER — NALOXONE HYDROCHLORIDE 4 MG/.1ML
0.4 SPRAY NASAL ONCE
Refills: 0 | Status: DISCONTINUED | OUTPATIENT
Start: 2022-06-19 | End: 2022-06-25

## 2022-06-19 RX ORDER — MORPHINE SULFATE 50 MG/1
4 CAPSULE, EXTENDED RELEASE ORAL EVERY 4 HOURS
Refills: 0 | Status: DISCONTINUED | OUTPATIENT
Start: 2022-06-19 | End: 2022-06-19

## 2022-06-19 RX ORDER — ALBUMIN HUMAN 25 %
50 VIAL (ML) INTRAVENOUS
Refills: 0 | Status: COMPLETED | OUTPATIENT
Start: 2022-06-19 | End: 2022-06-19

## 2022-06-19 RX ORDER — ACETAMINOPHEN 500 MG
1000 TABLET ORAL ONCE
Refills: 0 | Status: COMPLETED | OUTPATIENT
Start: 2022-06-19 | End: 2022-06-19

## 2022-06-19 RX ORDER — SODIUM CHLORIDE 9 MG/ML
1000 INJECTION INTRAMUSCULAR; INTRAVENOUS; SUBCUTANEOUS ONCE
Refills: 0 | Status: COMPLETED | OUTPATIENT
Start: 2022-06-19 | End: 2022-06-19

## 2022-06-19 RX ORDER — OXYCODONE HYDROCHLORIDE 5 MG/1
2.5 TABLET ORAL EVERY 4 HOURS
Refills: 0 | Status: DISCONTINUED | OUTPATIENT
Start: 2022-06-19 | End: 2022-06-25

## 2022-06-19 RX ORDER — NOREPINEPHRINE BITARTRATE/D5W 8 MG/250ML
0.05 PLASTIC BAG, INJECTION (ML) INTRAVENOUS
Qty: 8 | Refills: 0 | Status: DISCONTINUED | OUTPATIENT
Start: 2022-06-19 | End: 2022-06-19

## 2022-06-19 RX ORDER — POTASSIUM PHOSPHATE, MONOBASIC POTASSIUM PHOSPHATE, DIBASIC 236; 224 MG/ML; MG/ML
30 INJECTION, SOLUTION INTRAVENOUS ONCE
Refills: 0 | Status: COMPLETED | OUTPATIENT
Start: 2022-06-19 | End: 2022-06-19

## 2022-06-19 RX ORDER — MORPHINE SULFATE 50 MG/1
3 CAPSULE, EXTENDED RELEASE ORAL
Refills: 0 | Status: DISCONTINUED | OUTPATIENT
Start: 2022-06-19 | End: 2022-06-19

## 2022-06-19 RX ORDER — CHLORHEXIDINE GLUCONATE 213 G/1000ML
1 SOLUTION TOPICAL
Refills: 0 | Status: DISCONTINUED | OUTPATIENT
Start: 2022-06-19 | End: 2022-06-22

## 2022-06-19 RX ORDER — PANTOPRAZOLE SODIUM 20 MG/1
40 TABLET, DELAYED RELEASE ORAL DAILY
Refills: 0 | Status: DISCONTINUED | OUTPATIENT
Start: 2022-06-19 | End: 2022-06-22

## 2022-06-19 RX ORDER — MORPHINE SULFATE 50 MG/1
3 CAPSULE, EXTENDED RELEASE ORAL
Refills: 0 | Status: DISCONTINUED | OUTPATIENT
Start: 2022-06-19 | End: 2022-06-22

## 2022-06-19 RX ORDER — METRONIDAZOLE 500 MG
500 TABLET ORAL ONCE
Refills: 0 | Status: COMPLETED | OUTPATIENT
Start: 2022-06-19 | End: 2022-06-19

## 2022-06-19 RX ORDER — POLYETHYLENE GLYCOL 3350 17 G/17G
17 POWDER, FOR SOLUTION ORAL DAILY
Refills: 0 | Status: DISCONTINUED | OUTPATIENT
Start: 2022-06-19 | End: 2022-06-21

## 2022-06-19 RX ORDER — OXYCODONE HYDROCHLORIDE 5 MG/1
5 TABLET ORAL EVERY 4 HOURS
Refills: 0 | Status: DISCONTINUED | OUTPATIENT
Start: 2022-06-19 | End: 2022-06-25

## 2022-06-19 RX ORDER — MORPHINE SULFATE 50 MG/1
2 CAPSULE, EXTENDED RELEASE ORAL
Refills: 0 | Status: DISCONTINUED | OUTPATIENT
Start: 2022-06-19 | End: 2022-06-19

## 2022-06-19 RX ADMIN — SODIUM CHLORIDE 1000 MILLILITER(S): 9 INJECTION, SOLUTION INTRAVENOUS at 14:32

## 2022-06-19 RX ADMIN — CEFTRIAXONE 100 MILLIGRAM(S): 500 INJECTION, POWDER, FOR SOLUTION INTRAMUSCULAR; INTRAVENOUS at 18:49

## 2022-06-19 RX ADMIN — Medication 4.98 MICROGRAM(S)/KG/MIN: at 00:30

## 2022-06-19 RX ADMIN — MORPHINE SULFATE 2 MILLIGRAM(S): 50 CAPSULE, EXTENDED RELEASE ORAL at 11:30

## 2022-06-19 RX ADMIN — Medication 2.49 MICROGRAM(S)/KG/MIN: at 05:36

## 2022-06-19 RX ADMIN — Medication 1000 MILLIGRAM(S): at 15:30

## 2022-06-19 RX ADMIN — SODIUM CHLORIDE 1000 MILLILITER(S): 9 INJECTION INTRAMUSCULAR; INTRAVENOUS; SUBCUTANEOUS at 01:03

## 2022-06-19 RX ADMIN — SODIUM CHLORIDE 1500 MILLILITER(S): 9 INJECTION INTRAMUSCULAR; INTRAVENOUS; SUBCUTANEOUS at 04:05

## 2022-06-19 RX ADMIN — Medication 400 MILLIGRAM(S): at 15:35

## 2022-06-19 RX ADMIN — MORPHINE SULFATE 3 MILLIGRAM(S): 50 CAPSULE, EXTENDED RELEASE ORAL at 23:09

## 2022-06-19 RX ADMIN — SODIUM CHLORIDE 1000 MILLILITER(S): 9 INJECTION, SOLUTION INTRAVENOUS at 07:30

## 2022-06-19 RX ADMIN — Medication 1000 MILLIGRAM(S): at 22:17

## 2022-06-19 RX ADMIN — MORPHINE SULFATE 4 MILLIGRAM(S): 50 CAPSULE, EXTENDED RELEASE ORAL at 05:35

## 2022-06-19 RX ADMIN — MORPHINE SULFATE 4 MILLIGRAM(S): 50 CAPSULE, EXTENDED RELEASE ORAL at 02:15

## 2022-06-19 RX ADMIN — SODIUM CHLORIDE 80 MILLILITER(S): 9 INJECTION, SOLUTION INTRAVENOUS at 15:32

## 2022-06-19 RX ADMIN — Medication 50 MILLILITER(S): at 16:49

## 2022-06-19 RX ADMIN — Medication 100 MILLIGRAM(S): at 13:49

## 2022-06-19 RX ADMIN — Medication 100 MILLIGRAM(S): at 22:10

## 2022-06-19 RX ADMIN — PANTOPRAZOLE SODIUM 40 MILLIGRAM(S): 20 TABLET, DELAYED RELEASE ORAL at 13:48

## 2022-06-19 RX ADMIN — MORPHINE SULFATE 4 MILLIGRAM(S): 50 CAPSULE, EXTENDED RELEASE ORAL at 05:49

## 2022-06-19 RX ADMIN — SODIUM CHLORIDE 150 MILLILITER(S): 9 INJECTION, SOLUTION INTRAVENOUS at 22:16

## 2022-06-19 RX ADMIN — Medication 30 MILLILITER(S): at 20:21

## 2022-06-19 RX ADMIN — SODIUM CHLORIDE 1000 MILLILITER(S): 9 INJECTION INTRAMUSCULAR; INTRAVENOUS; SUBCUTANEOUS at 06:38

## 2022-06-19 RX ADMIN — Medication 100 MILLIGRAM(S): at 05:34

## 2022-06-19 RX ADMIN — CHLORHEXIDINE GLUCONATE 1 APPLICATION(S): 213 SOLUTION TOPICAL at 05:36

## 2022-06-19 RX ADMIN — MORPHINE SULFATE 2 MILLIGRAM(S): 50 CAPSULE, EXTENDED RELEASE ORAL at 11:14

## 2022-06-19 RX ADMIN — Medication 25 GRAM(S): at 11:14

## 2022-06-19 RX ADMIN — SODIUM CHLORIDE 500 MILLILITER(S): 9 INJECTION INTRAMUSCULAR; INTRAVENOUS; SUBCUTANEOUS at 00:00

## 2022-06-19 RX ADMIN — Medication 25 GRAM(S): at 12:34

## 2022-06-19 RX ADMIN — SODIUM CHLORIDE 500 MILLILITER(S): 9 INJECTION, SOLUTION INTRAVENOUS at 15:30

## 2022-06-19 RX ADMIN — Medication 50 MILLILITER(S): at 19:01

## 2022-06-19 RX ADMIN — POTASSIUM PHOSPHATE, MONOBASIC POTASSIUM PHOSPHATE, DIBASIC 83.33 MILLIMOLE(S): 236; 224 INJECTION, SOLUTION INTRAVENOUS at 15:33

## 2022-06-19 NOTE — PROGRESS NOTE ADULT - ASSESSMENT
urosepsis on antibiotics   s/p exp lap bladder repair 06/18  s/p TLH BS 06/08  s/p 2 PRBCs      Continue under ICU care

## 2022-06-19 NOTE — PROGRESS NOTE ADULT - ASSESSMENT
46y old  Female who presents with a chief complaint of pelvic abscess and fever s/p hysterectomy 6/8/2022, comes in with abdominal pain, was found to have bladder perforation. S/p exploratory laparotomy, repair of bladder perforation, abdominal washout 6/18 became hypotensive. Was given 4L IVF. Patient is being admitted to ICU for septic shock requiring pressors.    Assessment:  #Septic shock 2/2 Urosepsis  #Hypovolemic shock secondary to blood loss  #Bladder perf s/p blader repair  #GIOVANI    #NEURO  Pain control  -morphine prn    #Pulmonary  No issues    #CVS  Septic shock 2/2 Urosepsis + Hypovolemic Shock  -p/w abdominal pain, dx with bladder perf, s/p repair on 6/18  -Post op course complicated by septic shock refractory to fluids, received total of 4L intra and post op  -Levophed  -lactated 1.8  -c/w rocephin  -Monitor Vital signs    #GI  No issues    #  Urosepsis and Bladder perf s/p repair   -c/w rocephin  -surgery and GYn is following    GIOVANI  2/2 urosepsis and dehydration  c/w IV hydration  c/w antbx    #ID  Septic Shock  -GNR bacteremia on admission  - c/w rocephin  - f/u blood culture final report and sensitivities    #Heme  Hypovolemic Shock  Hb 12 > 6.8  Transfuse 2u pRBC  f/u post transfusion cbc    #Endo  No issues    #Prophylactic  On SCd for DVT prophylaxis ASSESSMENT & PLAN:  46-year-old woman with history of a recent hysterectomy (6/8/22) for a cyst, presented to  on 6/17 with abdominal pain and fevers, found to have septic shock related to B. Fragilis bacteremia from a bladder perforation with peritoneal extravasation, emergent OR repaid of perforation with abdominal washout (6/18), admitted to ICU postoperatively for hypotension. Course complicated by acute blood loss anemia post operative and worsening septic shock requiring vasopressor support.      ACTIVE ISSUES:  #Septic Shock secondary to B. Fragilis Bacteremia  #Hemorrhagic Shock  #Bladder Perforation status post OR Repair (6/18)  #Pelvic Fluid Collection/Phlegmon Formation status post Washout in OR (6/18)  #Acute Pre-Renal Nonoliguric Renal Failure    PLAN:  Neurologic  #Pain Control  - Morphine IVP PRN for postoperative pain.   - If continued renal function can add Toradol IVP.     Pulmonary:  #No Acute Issues  - Received a large volume of IVF during resuscitation monitor closely for development of pulmonary edema.   - Lung POCUS currently with Laurel, no B lines.   - Continuous pulse ox monitoring.     Cardiovascular  #Septic Shock secondary to B. Fragilis Bacteremia  #Hemorrhagic Shock  - Severe hypotension postoperatively from sepsis and hypovolemia.   - Received large volume IVF and PRBC x2 with improvement.   - Off vasopressors.   - Lactate normalized   - TTE ordered and pending.     GI  #Nutrition  - Cleared for clear diet by surgery attending,  advance diet per surgery team.   - Monitor for gas/bowel movement.     Renal//GYN  #Bladder Perforation with Pelvic Fluid Collection  - Discovery of 8mm bladder perforation on CT scan and status post OR repair and washout (6/18).   - Lora draining dark maroon colored urine. Monitor for clots.   - Maintain Lora in place.   - Urology Dr. Mitchell following.   - GYN Dr. Patterson following.     #Acute Pre-Renal Nonoliguric Renal Failure  - Pre-renal secondary to hypovolemia and hypotension.   - Resolving.   - Monitor renal function.     ID  B. Fragilis Bacteremia  - Blood cultures (6/18) resulted with B. Fragilis, urosepsis related to pelvic fluid collection from bladder perforation.   - Antibiotic coverage with Flagyl (6/18 - ?) and Ceftriaxone (6/17 - ?).  - Repeat cultures to evaluate for clearance   - ID Dr. Maldonado following.   - Midline catheter in place for antibiotic administration.     Heme/Onc    Hypovolemic Shock  Hb 12 > 6.8  Transfuse 2u pRBC  f/u post transfusion cbc    #Endo  No issues    #Prophylactic  On SCd for DVT prophylaxis ASSESSMENT & PLAN:  46-year-old woman with history of a recent hysterectomy (6/8/22) for a cyst, presented to  on 6/17 with abdominal pain and fevers, found to have septic shock related to B. Fragilis bacteremia from a bladder perforation with peritoneal extravasation, emergent OR repaid of perforation with abdominal washout (6/18), admitted to ICU postoperatively for hypotension. Course complicated by acute blood loss anemia post operative and worsening septic shock requiring vasopressor support.      ACTIVE ISSUES:  #Septic Shock secondary to B. Fragilis Bacteremia  #Hemorrhagic Shock  #Bladder Perforation status post OR Repair (6/18)  #Pelvic Fluid Collection/Phlegmon Formation status post Washout in OR (6/18)  #Acute Pre-Renal Nonoliguric Renal Failure    PLAN:  Neurologic  #Pain Control  - Morphine IVP PRN for postoperative pain.   - If continued renal function can add Toradol IVP.     Pulmonary:  #No Acute Issues  - Received a large volume of IVF during resuscitation monitor closely for development of pulmonary edema.   - Lung POCUS currently with Laurel, no B lines.   - Continuous pulse ox monitoring.     Cardiovascular  #Septic Shock secondary to B. Fragilis Bacteremia  #Hemorrhagic Shock  - Severe hypotension postoperatively from sepsis and hypovolemia.   - Received large volume IVF and PRBC x2 with improvement.   - Off vasopressors.   - Lactate normalized   - TTE ordered and pending.     GI  #Nutrition  - Cleared for clear diet by surgery attending,  advance diet per surgery team.   - Monitor for gas/bowel movement.   - Pantoprazole 40mg IVP daily.     Renal//GYN  #Bladder Perforation with Pelvic Fluid Collection  - Discovery of 8mm bladder perforation on CT scan and status post OR repair and washout (6/18).   - Lora draining dark maroon colored urine. Monitor for clots.   - Maintain Lora in place.   - Urology Dr. Mitchell following.   - GYN Dr. Patterson following.     #Acute Pre-Renal Nonoliguric Renal Failure  - Pre-renal secondary to hypovolemia and hypotension.   - Resolving.   - Monitor renal function.     ID  B. Fragilis Bacteremia  - Blood cultures (6/18) resulted with B. Fragilis, urosepsis related to pelvic fluid collection from bladder perforation.   - Antibiotic coverage with Flagyl (6/18 - ?) and Ceftriaxone (6/17 - ?).  - Repeat cultures to evaluate for clearance   - ID Dr. Maldonado following.   - Midline catheter in place for antibiotic administration.     Heme/Onc  #Acute Blood Loss Induce Anemia  #Hemorrhagic Shock  - Status post 2 units PRBC. Closely monitor CBC q6, likely to require additional blood.   - Was on Lovenox prior to OR due to possible venous thrombus, surgery team reports oozing during class, likely contributing to blood loss.   - Hold anticoagulation including chemical VTE prophylaxis.   #?Ovarian   - CT (6/17) with "Question central filling defect within the right ovarian vein distally versus interdigitating fluid between branches. MRV can be obtained to   further evaluate for ovarian vein thrombosis."  - Had received full      Endocrine  #No Actue Issues    #Prophylactic  On SCd for DVT prophylaxis ASSESSMENT & PLAN:  46-year-old woman with history of a recent hysterectomy (6/8/22) for a cyst, presented to  on 6/17 with abdominal pain and fevers, found to have septic shock related to B. Fragilis bacteremia from a bladder perforation with peritoneal extravasation, emergent OR repaid of perforation with abdominal washout (6/18), admitted to ICU postoperatively for hypotension. Course complicated by acute blood loss anemia post operative and worsening septic shock requiring vasopressor support.      ACTIVE ISSUES:  #Septic Shock secondary to B. Fragilis Bacteremia  #Hemorrhagic Shock  #Bladder Perforation status post OR Repair (6/18)  #Pelvic Fluid Collection/Phlegmon Formation status post Washout in OR (6/18)  #Acute Pre-Renal Nonoliguric Renal Failure  #?Ovarian Vein Thrombosis     PLAN:  Neurologic  #Pain Control  - Morphine IVP PRN for postoperative pain.   - If continued renal function can add Toradol IVP.     Pulmonary:  #No Acute Issues  - Received a large volume of IVF during resuscitation monitor closely for development of pulmonary edema.   - Lung POCUS currently with Laurel, gabe B lines.   - Continuous pulse ox monitoring.     Cardiovascular  #Septic Shock secondary to B. Fragilis Bacteremia  #Hemorrhagic Shock  - Severe hypotension postoperatively from sepsis and hypovolemia.   - Received large volume IVF and PRBC x2 with improvement.   - Off vasopressors.   - Lactate normalized   - TTE ordered and pending.     GI  #Nutrition  - Cleared for clear diet by surgery attending,  advance diet per surgery team.   - Monitor for gas/bowel movement.   - Pantoprazole 40mg IVP daily.     Renal//GYN  #Bladder Perforation with Pelvic Fluid Collection  - Discovery of 8mm bladder perforation on CT scan and status post OR repair and washout (6/18).   - Lora draining dark maroon colored urine. Monitor for clots.   - Maintain Lora in place.   - Urology Dr. Mitchell following.   - GYN Dr. Patterson following.     #Acute Pre-Renal Nonoliguric Renal Failure  - Pre-renal secondary to hypovolemia and hypotension.   - Resolving.   - Monitor renal function.     ID  B. Fragilis Bacteremia  - Blood cultures (6/18) resulted with B. Fragilis, urosepsis related to pelvic fluid collection from bladder perforation.   - Antibiotic coverage with Flagyl (6/18 - ?) and Ceftriaxone (6/17 - ?).  - Repeat cultures to evaluate for clearance   - ID Dr. Erica following.   - Midline catheter in place for antibiotic administration.     Heme/Onc  #Acute Blood Loss Induce Anemia  #Hemorrhagic Shock  - Status post 2 units PRBC. Closely monitor CBC q6, likely to require additional blood.   - Was on Lovenox prior to OR due to possible venous thrombus, surgery team reports oozing during class, likely contributing to blood loss.   - Hold all anticoagulation/antiplatelet agents.   #?Ovarian Vein Thrombosis   - CT (6/17) with "Question central filling defect within the right ovarian vein distally versus interdigitating fluid between branches. MRV can be obtained to   further evaluate for ovarian vein thrombosis."  - Had received full dose Lovenox, no additional doses due to active bleeding.   - Once acute issues are stabilized could benefit from further thrombus evaluation, may end up needing anticoagulation therapy.   #VTE Prophylaxis  - SCDs.   - Hold chemical VTE prophylaxis due to bleeding.     Endocrine  #No Acute Issues    Ethics/Disposition  - Full Code  - Maintain in ICU

## 2022-06-19 NOTE — CHART NOTE - NSCHARTNOTEFT_GEN_A_CORE
GYN PA Focused Note    h/h at 8pm 7.1/20.1  patient seen in the ICU and reevaluated at bedside, offers no complaints at this time, states she feels better and pain is improving. Central line, PICC line, WALI drain and kendall in place. Kendall drained dark brown urine.    Vital Signs Last 24 Hrs  T(C): 37.4 (19 Jun 2022 20:00), Max: 38.1 (19 Jun 2022 15:33)  T(F): 99.4 (19 Jun 2022 20:00), Max: 100.5 (19 Jun 2022 15:33)  HR: 128 (19 Jun 2022 20:00) (116 - 151)  BP: 108/64 (19 Jun 2022 20:00) (79/42 - 118/69)  BP(mean): 73 (19 Jun 2022 20:00) (47 - 87)  RR: 30 (19 Jun 2022 20:00) (18 - 31)  SpO2: 94% (19 Jun 2022 20:00) (93% - 99%)    gen aox3, not in acute distress, non toxic appearing  abd soft, nondistended, no guarding, no rebound, dressing clean/dry/intact  gyn no VB, kendall  WALI drain minimal serosanguinous fluid   venodynes     reviewed orders and plan with medical resident at bedside in ICU, fluid rate increased, rpt cbc, give incentive spirometry  reviewed findings with Dr. Patterson continue dvt prophylaxis with venodynes, continue to monitor, f/u rpt cbc

## 2022-06-19 NOTE — PROCEDURE NOTE - ADDITIONAL PROCEDURE DETAILS
4 Nepali 20cm Midlines Catheter placed with sterile technique under ultrasound guidance by healthcare provider. Not Power Injectable. Placement confirmed via VBG and ultrasound. Good for up to 30 days. Discussed with primary team NP/Resident/RN who will monitor catheter per hospital protocol.

## 2022-06-19 NOTE — PROGRESS NOTE ADULT - SUBJECTIVE AND OBJECTIVE BOX
Patient is doing well, pain is well controlled.     Abdominal dressing in place and dry.   Abdominal drain in place, bloody drainage

## 2022-06-19 NOTE — PROGRESS NOTE ADULT - SUBJECTIVE AND OBJECTIVE BOX
INTERVAL HPI/OVERNIGHT EVENTS:   Pt transferred from gtn to icu service overnight. Central line in RIJ placed. Pt complaining of abdominal pain. Pt with Hb 6.8 now receiving 2u pRBC. Pt is GNR bacteremic.    PRESSORS: [ ] YES [ ] NO  WHICH:    ANTIBIOTICS:                      Antimicrobial:  cefTRIAXone   IVPB      cefTRIAXone   IVPB 1000 milliGRAM(s) IV Intermittent every 24 hours    Cardiovascular:  norepinephrine Infusion 0.05 MICROgram(s)/kG/Min IV Continuous <Continuous>    Pulmonary:    Hematalogic:    Other:  chlorhexidine 2% Cloths 1 Application(s) Topical <User Schedule>  influenza   Vaccine 0.5 milliLiter(s) IntraMuscular once  lactated ringers. 1000 milliLiter(s) IV Continuous <Continuous>  morphine  - Injectable 4 milliGRAM(s) IV Push every 4 hours PRN  ondansetron Injectable 4 milliGRAM(s) IV Push every 6 hours PRN  pantoprazole  Injectable 40 milliGRAM(s) IV Push daily  sodium chloride 0.9% lock flush 10 milliLiter(s) IV Push every 1 hour PRN    cefTRIAXone   IVPB      cefTRIAXone   IVPB 1000 milliGRAM(s) IV Intermittent every 24 hours  chlorhexidine 2% Cloths 1 Application(s) Topical <User Schedule>  influenza   Vaccine 0.5 milliLiter(s) IntraMuscular once  lactated ringers. 1000 milliLiter(s) IV Continuous <Continuous>  morphine  - Injectable 4 milliGRAM(s) IV Push every 4 hours PRN  norepinephrine Infusion 0.05 MICROgram(s)/kG/Min IV Continuous <Continuous>  ondansetron Injectable 4 milliGRAM(s) IV Push every 6 hours PRN  pantoprazole  Injectable 40 milliGRAM(s) IV Push daily  sodium chloride 0.9% lock flush 10 milliLiter(s) IV Push every 1 hour PRN    Drug Dosing Weight  Height (cm): 157.5 (2022 09:54)  Weight (kg): 53.1 (2022 09:54)  BMI (kg/m2): 21.4 (2022 09:54)  BSA (m2): 1.52 (2022 09:54)    CENTRAL LINE: [ ] YES [ ] NO  LOCATION:   DATE INSERTED:  REMOVE: [ ] YES [ ] NO  EXPLAIN:    DIAZ: [ ] YES [ ] NO    DATE INSERTED:  REMOVE:  [ ] YES [ ] NO  EXPLAIN:    A-LINE:  [ ] YES [ ] NO  LOCATION:   DATE INSERTED:  REMOVE:  [ ] YES [ ] NO  EXPLAIN:    PMH -reviewed admission note, no change since admission    ICU Vital Signs Last 24 Hrs  T(C): 36.4 (:45), Max: 37.7 (2022 20:29)  T(F): 97.5 (:45), Max: 99.9 (2022 20:29)  HR: 133 (:45) (97 - 133)  BP: 86/56 (:45) (82/566 - 114/70)  BP(mean): 61 (:45) (61 - 84)  ABP: --  ABP(mean): --  RR: 26 (:45) (16 - 30)  SpO2: 96% (:45) (94% - 99%)                    PHYSICAL EXAM:    GENERAL: NAD, well-groomed, well-developed  HEAD:  Atraumatic, Normocephalic  EYES: EOMI, PERRLA, conjunctiva and sclera clear  ENMT: No tonsillar erythema, exudates, or enlargement; Moist mucous membranes, Good dentition, No lesions  NECK: Supple, normal appearance, No JVD; Normal thyroid; Trachea midline  NERVOUS SYSTEM:  Alert & Oriented X3, Good concentration; Motor Strength 5/5 B/L upper and lower extremities; DTRs 2+ intact and symmetric  CHEST/LUNG: No chest deformity; Normal percussion bilaterally; No rales, rhonchi, wheezing   HEART: Regular rate and rhythm; No murmurs, rubs, or gallops  ABDOMEN: (+) tenderness on palpation, distended, (+) WALI drain present draining serosanguinous fluid, (+) Diaz catheter present with hematuria  EXTREMITIES:  2+ Peripheral Pulses, No clubbing, cyanosis, or edema  LYMPH: No lymphadenopathy noted  SKIN: No rashes or lesions;       LABS:  CBC Full  -  ( 2022 01:10 )  WBC Count : 34.25 K/uL  RBC Count : 2.15 M/uL  Hemoglobin : 6.8 g/dL  Hematocrit : 19.9 %  Platelet Count - Automated : 396 K/uL  Mean Cell Volume : 92.6 fl  Mean Cell Hemoglobin : 31.6 pg  Mean Cell Hemoglobin Concentration : 34.2 gm/dL  Auto Neutrophil # : x  Auto Lymphocyte # : x  Auto Monocyte # : x  Auto Eosinophil # : x  Auto Basophil # : x  Auto Neutrophil % : x  Auto Lymphocyte % : x  Auto Monocyte % : x  Auto Eosinophil % : x  Auto Basophil % : x        139  |  112<H>  |  10  ----------------------------<  127<H>  4.0   |  18<L>  |  0.65    Ca    6.8<L>      2022 01:10    TPro  4.1<L>  /  Alb  1.1<L>  /  TBili  0.5  /  DBili  x   /  AST  21  /  ALT  15  /  AlkPhos  55        Urinalysis Basic - ( 2022 12:53 )    Color: Yellow / Appearance: Slightly Turbid / S.020 / pH: x  Gluc: x / Ketone: Small  / Bili: Negative / Urobili: Negative   Blood: x / Protein: 100 / Nitrite: Negative   Leuk Esterase: Moderate / RBC: >50 /HPF / WBC >50 /HPF   Sq Epi: x / Non Sq Epi: Negative /HPF / Bacteria: Few /HPF      Culture Results:   Growth in anaerobic bottle: Gram Negative Rods  ***Blood Panel PCR results on this specimen are available  approximately 3 hours after the Gram stain result.***  Gram stain, PCR, and/or culture results may not always  correspond due to difference in methodologies.  ************************************************************  This PCR assay was performed by multiplex PCR. This  Assay tests for 66 bacterial and resistance gene targets.  Please refer to the  Labs test directory  at [email or web address] for details. ( @ 18:54)  Culture Results:   No growth to date. ( @ 18:45)  Culture Results:   No growth ( @ 18:41)      RADIOLOGY & ADDITIONAL STUDIES REVIEWED:  ***    GOALS OF CARE DISCUSSION WITH PATIENT/FAMILY/PROXY:    CRITICAL CARE TIME SPENT: 35 minutes INTERVAL HPI/OVERNIGHT EVENTS:   Pt transferred from gtn to icu service overnight. Central line in RIJ placed. Pt complaining of abdominal pain. Pt with Hb 6.8 now receiving 2u pRBC. Pt is GNR bacteremic.    PRESSORS: [ ] YES [ ] NO  WHICH:    ANTIBIOTICS:                      Antimicrobial:  cefTRIAXone   IVPB      cefTRIAXone   IVPB 1000 milliGRAM(s) IV Intermittent every 24 hours    Cardiovascular:  norepinephrine Infusion 0.05 MICROgram(s)/kG/Min IV Continuous <Continuous>    Pulmonary:    Hematalogic:    Other:  chlorhexidine 2% Cloths 1 Application(s) Topical <User Schedule>  influenza   Vaccine 0.5 milliLiter(s) IntraMuscular once  lactated ringers. 1000 milliLiter(s) IV Continuous <Continuous>  morphine  - Injectable 4 milliGRAM(s) IV Push every 4 hours PRN  ondansetron Injectable 4 milliGRAM(s) IV Push every 6 hours PRN  pantoprazole  Injectable 40 milliGRAM(s) IV Push daily  sodium chloride 0.9% lock flush 10 milliLiter(s) IV Push every 1 hour PRN    cefTRIAXone   IVPB      cefTRIAXone   IVPB 1000 milliGRAM(s) IV Intermittent every 24 hours  chlorhexidine 2% Cloths 1 Application(s) Topical <User Schedule>  influenza   Vaccine 0.5 milliLiter(s) IntraMuscular once  lactated ringers. 1000 milliLiter(s) IV Continuous <Continuous>  morphine  - Injectable 4 milliGRAM(s) IV Push every 4 hours PRN  norepinephrine Infusion 0.05 MICROgram(s)/kG/Min IV Continuous <Continuous>  ondansetron Injectable 4 milliGRAM(s) IV Push every 6 hours PRN  pantoprazole  Injectable 40 milliGRAM(s) IV Push daily  sodium chloride 0.9% lock flush 10 milliLiter(s) IV Push every 1 hour PRN    Drug Dosing Weight  Height (cm): 157.5 (2022 09:54)  Weight (kg): 53.1 (2022 09:54)  BMI (kg/m2): 21.4 (2022 09:54)  BSA (m2): 1.52 (2022 09:54)    CENTRAL LINE: [ ] YES [ ] NO  LOCATION:   DATE INSERTED:  REMOVE: [ ] YES [ ] NO  EXPLAIN:    DIAZ: [ ] YES [ ] NO    DATE INSERTED:  REMOVE:  [ ] YES [ ] NO  EXPLAIN:    A-LINE:  [ ] YES [ ] NO  LOCATION:   DATE INSERTED:  REMOVE:  [ ] YES [ ] NO  EXPLAIN:    PMH -reviewed admission note, no change since admission    ICU Vital Signs Last 24 Hrs  T(C): 36.4 (:45), Max: 37.7 (2022 20:29)  T(F): 97.5 (:45), Max: 99.9 (2022 20:29)  HR: 133 (:45) (97 - 133)  BP: 86/56 (:45) (82/566 - 114/70)  BP(mean): 61 (:45) (61 - 84)  ABP: --  ABP(mean): --  RR: 26 (:45) (16 - 30)  SpO2: 96% (:45) (94% - 99%)                    PHYSICAL EXAM:    GENERAL: NAD, well-groomed, well-developed  HEAD:  Atraumatic, Normocephalic  EYES: EOMI, PERRLA, conjunctiva and sclera clear  ENMT: No tonsillar erythema, exudates, or enlargement; Moist mucous membranes, Good dentition, No lesions  NECK: Supple, normal appearance, No JVD; Normal thyroid; Trachea midline  NERVOUS SYSTEM:  Alert & Oriented X3, Good concentration; Motor Strength 5/5 B/L upper and lower extremities; DTRs 2+ intact and symmetric  CHEST/LUNG: No chest deformity; Normal percussion bilaterally; No rales, rhonchi, wheezing   HEART: Regular rate and rhythm; No murmurs, rubs, or gallops  ABDOMEN: (+) tenderness on palpation, distended, (+) WALI drain present draining serosanguinous fluid, (+) Diaz catheter present with hematuria  EXTREMITIES:  2+ Peripheral Pulses, No clubbing, cyanosis, or edema  LYMPH: No lymphadenopathy noted  SKIN: No rashes or lesions;       LABS:  CBC Full  -  ( 2022 01:10 )  WBC Count : 34.25 K/uL  RBC Count : 2.15 M/uL  Hemoglobin : 6.8 g/dL  Hematocrit : 19.9 %  Platelet Count - Automated : 396 K/uL  Mean Cell Volume : 92.6 fl  Mean Cell Hemoglobin : 31.6 pg  Mean Cell Hemoglobin Concentration : 34.2 gm/dL  Auto Neutrophil # : x  Auto Lymphocyte # : x  Auto Monocyte # : x  Auto Eosinophil # : x  Auto Basophil # : x  Auto Neutrophil % : x  Auto Lymphocyte % : x  Auto Monocyte % : x  Auto Eosinophil % : x  Auto Basophil % : x        139  |  112<H>  |  10  ----------------------------<  127<H>  4.0   |  18<L>  |  0.65    Ca    6.8<L>      2022 01:10    TPro  4.1<L>  /  Alb  1.1<L>  /  TBili  0.5  /  DBili  x   /  AST  21  /  ALT  15  /  AlkPhos  55        Urinalysis Basic - ( 2022 12:53 )    Color: Yellow / Appearance: Slightly Turbid / S.020 / pH: x  Gluc: x / Ketone: Small  / Bili: Negative / Urobili: Negative   Blood: x / Protein: 100 / Nitrite: Negative   Leuk Esterase: Moderate / RBC: >50 /HPF / WBC >50 /HPF   Sq Epi: x / Non Sq Epi: Negative /HPF / Bacteria: Few /HPF      Culture Results:   Growth in anaerobic bottle: Gram Negative Rods  ***Blood Panel PCR results on this specimen are available  approximately 3 hours after the Gram stain result.***  Gram stain, PCR, and/or culture results may not always  correspond due to difference in methodologies.  ************************************************************  This PCR assay was performed by multiplex PCR. This  Assay tests for 66 bacterial and resistance gene targets.  Please refer to the [facility] Labs test directory  at [email or web address] for details. ( @ 18:54)  Culture Results:   No growth to date. ( @ 18:45)  Culture Results:   No growth ( @ 18:41)      RADIOLOGY & ADDITIONAL STUDIES REVIEWED:  ***    CT abd/ pelvis:< from: CT Abdomen and Pelvis w/wo IV Cont (22 @ 14:16) >    ACC: [id number] EXAM:  CT ABDOMEN AND PELVIS WAW IC                          PROCEDURE DATE:  2022          INTERPRETATION:  CLINICAL INFORMATION: Postop day 10 hysterectomy. Pelvic   abscess. Fever.    COMPARISON: Contrast-enhanced CT of the abdomen and pelvis 2022.    CONTRAST/COMPLICATIONS:  IV Contrast: Omnipaque 350  90 cc administered   10 cc discarded  Oral Contrast: NONE  Complications: None reported at time of study completion    PROCEDURE:  CT of the Abdomen and Pelvis was performed.  Sagittal and coronal reformats were performed.    FINDINGS:  LOWER CHEST: There are mild bilateral layering pleural effusions with   moderate bilateral lower lobe dependent compressive atelectasis.    LIVER: Scattered hepatic cysts. Otherwise, within normal limits.  BILE DUCTS: Normal caliber.  GALLBLADDER: Within normal limits.  SPLEEN: Within normal limits.  PANCREAS: Within normal limits.  ADRENALS: Within normal limits.  KIDNEYS/URETERS: There is persistent enhancement of the bilateral renal   cortices and excreted contrast in the renal collecting systems and   ureters excretion of the gallbladder. Otherwise, the kidneys are   symmetric in appearance, enhancement, and excretion.    The renal collecting systems and ureters aresymmetric in opacification   and course.    BLADDER: There is a Diaz catheter extending through an 8 mm defect in   the posterior midline wall on image 1:30, with the balloon inflated   within the peritoneal cavity of the pelvis. There is extravasation of   excreted contrast throughout the peritoneal cavity.  REPRODUCTIVE ORGANS: The uterus and ovaries have been removed.    BOWEL: No bowel obstruction.  PERITONEUM: No ascites.  VESSELS: Within normal limits.  RETROPERITONEUM/LYMPH NODES: No lymphadenopathy. Shotty para-aortic and   paracaval lymph nodes.  ABDOMINAL WALL: Within normal limits.  BONES: Within normal limits.    IMPRESSION: 8 mm perforation of the midline posterior bladder wall   through which extends a Diaz catheter. Free extravasation of excreted   contrast into the peritoneal cavity. Results discussed with YAMILEX Hoffman at   time of interpretation.    --- End of Report ---            CARMENZA POWELL MD; Attending Radiologist  This document has been electronically signed. 2022  3:42PM    < end of copied text >      GOALS OF CARE DISCUSSION WITH PATIENT/FAMILY/PROXY:    CRITICAL CARE TIME SPENT: 35 minutes INTERVAL HPI/OVERNIGHT EVENTS:   Pt transferred from gtn to icu service overnight. Central line in RIJ placed. Pt complaining of abdominal pain. Pt with Hb 6.8 now receiving 2u pRBC. Pt is GNR bacteremic.    PRESSORS: [ ] YES [X] NO  WHICH:    ANTIBIOTICS:                      Antimicrobial:  cefTRIAXone   IVPB      cefTRIAXone   IVPB 1000 milliGRAM(s) IV Intermittent every 24 hours    Cardiovascular:  norepinephrine Infusion 0.05 MICROgram(s)/kG/Min IV Continuous <Continuous>    Pulmonary:    Hematalogic:    Other:  chlorhexidine 2% Cloths 1 Application(s) Topical <User Schedule>  influenza   Vaccine 0.5 milliLiter(s) IntraMuscular once  lactated ringers. 1000 milliLiter(s) IV Continuous <Continuous>  morphine  - Injectable 4 milliGRAM(s) IV Push every 4 hours PRN  ondansetron Injectable 4 milliGRAM(s) IV Push every 6 hours PRN  pantoprazole  Injectable 40 milliGRAM(s) IV Push daily  sodium chloride 0.9% lock flush 10 milliLiter(s) IV Push every 1 hour PRN    cefTRIAXone   IVPB      cefTRIAXone   IVPB 1000 milliGRAM(s) IV Intermittent every 24 hours  chlorhexidine 2% Cloths 1 Application(s) Topical <User Schedule>  influenza   Vaccine 0.5 milliLiter(s) IntraMuscular once  lactated ringers. 1000 milliLiter(s) IV Continuous <Continuous>  morphine  - Injectable 4 milliGRAM(s) IV Push every 4 hours PRN  norepinephrine Infusion 0.05 MICROgram(s)/kG/Min IV Continuous <Continuous>  ondansetron Injectable 4 milliGRAM(s) IV Push every 6 hours PRN  pantoprazole  Injectable 40 milliGRAM(s) IV Push daily  sodium chloride 0.9% lock flush 10 milliLiter(s) IV Push every 1 hour PRN    Drug Dosing Weight  Height (cm): 157.5 (2022 09:54)  Weight (kg): 53.1 (2022 09:54)  BMI (kg/m2): 21.4 (2022 09:54)  BSA (m2): 1.52 (2022 09:54)    CENTRAL LINE: [X] YES [ ] NO  LOCATION: [facility]  DATE INSERTED: 22  REMOVE: [ ] YES [X] NO  EXPLAIN:    DIAZ: [X] YES [ ] NO    DATE INSERTED:   REMOVE:  [ ] YES [X] NO  EXPLAIN: Post-bladder repair     A-LINE:  [ ] YES [X] NO  LOCATION:   DATE INSERTED:  REMOVE:  [ ] YES [ ] NO  EXPLAIN:    PMH -reviewed admission note, no change since admission    ICU Vital Signs Last 24 Hrs  T(C): 36.4 (2022 01:45), Max: 37.7 (2022 20:29)  T(F): 97.5 (:45), Max: 99.9 (2022 20:29)  HR: 133 (:45) (97 - 133)  BP: 86/56 (:45) (82/566 - 114/70)  BP(mean): 61 (:45) (61 - 84)  ABP: --  ABP(mean): --  RR: 26 (:45) (16 - 30)  SpO2: 96% (:45) (94% - 99%)                    PHYSICAL EXAM:    GENERAL: NAD, well-groomed, well-developed  HEAD:  Atraumatic, Normocephalic  EYES: EOMI, PERRLA, conjunctiva and sclera clear  ENMT: No tonsillar erythema, exudates, or enlargement; Moist mucous membranes, Good dentition, No lesions  NECK: Supple, normal appearance, No JVD; Normal thyroid; Trachea midline  NERVOUS SYSTEM:  Alert & Oriented X3, Good concentration; Motor Strength 5/5 B/L upper and lower extremities; DTRs 2+ intact and symmetric  CHEST/LUNG: No chest deformity; Normal percussion bilaterally; No rales, rhonchi, wheezing   HEART: Regular rate and rhythm; No murmurs, rubs, or gallops  ABDOMEN: (+) tenderness on palpation, distended, (+) WALI drain present draining serosanguinous fluid, (+) Diaz catheter present with hematuria  EXTREMITIES:  2+ Peripheral Pulses, No clubbing, cyanosis, or edema  LYMPH: No lymphadenopathy noted  SKIN: No rashes or lesions;       LABS:  CBC Full  -  ( 2022 01:10 )  WBC Count : 34.25 K/uL  RBC Count : 2.15 M/uL  Hemoglobin : 6.8 g/dL  Hematocrit : 19.9 %  Platelet Count - Automated : 396 K/uL  Mean Cell Volume : 92.6 fl  Mean Cell Hemoglobin : 31.6 pg  Mean Cell Hemoglobin Concentration : 34.2 gm/dL  Auto Neutrophil # : x  Auto Lymphocyte # : x  Auto Monocyte # : x  Auto Eosinophil # : x  Auto Basophil # : x  Auto Neutrophil % : x  Auto Lymphocyte % : x  Auto Monocyte % : x  Auto Eosinophil % : x  Auto Basophil % : x        139  |  112<H>  |  10  ----------------------------<  127<H>  4.0   |  18<L>  |  0.65    Ca    6.8<L>      2022 01:10    TPro  4.1<L>  /  Alb  1.1<L>  /  TBili  0.5  /  DBili  x   /  AST  21  /  ALT  15  /  AlkPhos  55        Urinalysis Basic - ( 2022 12:53 )    Color: Yellow / Appearance: Slightly Turbid / S.020 / pH: x  Gluc: x / Ketone: Small  / Bili: Negative / Urobili: Negative   Blood: x / Protein: 100 / Nitrite: Negative   Leuk Esterase: Moderate / RBC: >50 /HPF / WBC >50 /HPF   Sq Epi: x / Non Sq Epi: Negative /HPF / Bacteria: Few /HPF      Culture Results:   Growth in anaerobic bottle: Gram Negative Rods  ***Blood Panel PCR results on this specimen are available  approximately 3 hours after the Gram stain result.***  Gram stain, PCR, and/or culture results may not always  correspond due to difference in methodologies.  ************************************************************  This PCR assay was performed by multiplex PCR. This  Assay tests for 66 bacterial and resistance gene targets.  Please refer to the [facility] Labs test directory  at [email or web address] for details. ( @ 18:54)  Culture Results:   No growth to date. ( @ 18:45)  Culture Results:   No growth ( @ 18:41)      RADIOLOGY & ADDITIONAL STUDIES REVIEWED:  ***    CT abd/ pelvis:< from: CT Abdomen and Pelvis w/wo IV Cont (22 @ 14:16) >    ACC: [id number] EXAM:  CT ABDOMEN AND PELVIS WAW IC                          PROCEDURE DATE:  2022          INTERPRETATION:  CLINICAL INFORMATION: Postop day 10 hysterectomy. Pelvic   abscess. Fever.    COMPARISON: Contrast-enhanced CT of the abdomen and pelvis 2022.    CONTRAST/COMPLICATIONS:  IV Contrast: Omnipaque 350  90 cc administered   10 cc discarded  Oral Contrast: NONE  Complications: None reported at time of study completion    PROCEDURE:  CT of the Abdomen and Pelvis was performed.  Sagittal and coronal reformats were performed.    FINDINGS:  LOWER CHEST: There are mild bilateral layering pleural effusions with   moderate bilateral lower lobe dependent compressive atelectasis.    LIVER: Scattered hepatic cysts. Otherwise, within normal limits.  BILE DUCTS: Normal caliber.  GALLBLADDER: Within normal limits.  SPLEEN: Within normal limits.  PANCREAS: Within normal limits.  ADRENALS: Within normal limits.  KIDNEYS/URETERS: There is persistent enhancement of the bilateral renal   cortices and excreted contrast in the renal collecting systems and   ureters excretion of the gallbladder. Otherwise, the kidneys are   symmetric in appearance, enhancement, and excretion.    The renal collecting systems and ureters aresymmetric in opacification   and course.    BLADDER: There is a Diaz catheter extending through an 8 mm defect in   the posterior midline wall on image 1:30, with the balloon inflated   within the peritoneal cavity of the pelvis. There is extravasation of   excreted contrast throughout the peritoneal cavity.  REPRODUCTIVE ORGANS: The uterus and ovaries have been removed.    BOWEL: No bowel obstruction.  PERITONEUM: No ascites.  VESSELS: Within normal limits.  RETROPERITONEUM/LYMPH NODES: No lymphadenopathy. Shotty para-aortic and   paracaval lymph nodes.  ABDOMINAL WALL: Within normal limits.  BONES: Within normal limits.    IMPRESSION: 8 mm perforation of the midline posterior bladder wall   through which extends a Diaz catheter. Free extravasation of excreted   contrast into the peritoneal cavity. Results discussed with YAMILEX Hoffman at   time of interpretation.    --- End of Report ---            CARMENZA POWELL MD; Attending Radiologist  This document has been electronically signed. 2022  3:42PM    < end of copied text >      GOALS OF CARE DISCUSSION WITH PATIENT/FAMILY/PROXY:    CRITICAL CARE TIME SPENT: 35 minutes

## 2022-06-19 NOTE — PROGRESS NOTE ADULT - ASSESSMENT
A/P: Patient Hypotensive, tachycardic postop.   -1L bolus ordered. ICU consult obtained, Patient transferred to ICU for hemodynamic monitoring.  -Continue IV resuscitation & IV abx.  -Dr. Robles aware. Message left with Dr. Hays.

## 2022-06-19 NOTE — PROGRESS NOTE ADULT - ASSESSMENT
47 y/o Female s/p Exploratory Laparotomy, Complex repair of laceration of bladder 6/18; Septic shock 2/2 Urosepsis, Hypovolemic shock secondary to blood loss, s/p 2U PRBCs    -C/w kendall catheter   -Monitor WALI drain output   -Close observation   -Abx   -Diet as tolerated   -GYN f/u   -Care as per ICU

## 2022-06-19 NOTE — PROGRESS NOTE ADULT - SUBJECTIVE AND OBJECTIVE BOX
INTERVAL HPI/OVERNIGHT EVENTS:    Pt seen and examined at bedside. Admits to incisional pain, offers no other complaints. Denies n/v, no fever, chills, SOB or CP.   Pt is off pressors.     Vital Signs Last 24 Hrs  T(C): 36.2 (19 Jun 2022 05:30), Max: 37.7 (18 Jun 2022 20:29)  T(F): 97.2 (19 Jun 2022 05:30), Max: 99.9 (18 Jun 2022 20:29)  HR: 128 (19 Jun 2022 08:15) (113 - 151)  BP: 104/69 (19 Jun 2022 08:15) (82/566 - 118/69)  BP(mean): 75 (19 Jun 2022 08:15) (61 - 87)  RR: 28 (19 Jun 2022 08:15) (16 - 30)  SpO2: 96% (19 Jun 2022 08:15) (94% - 99%)  I&O's Detail    18 Jun 2022 07:01  -  19 Jun 2022 07:00  --------------------------------------------------------  IN:    IV PiggyBack: 100 mL    Lactated Ringers: 1000 mL    Lactated Ringers Bolus: 1500 mL    Norepinephrine: 73.3 mL    Phenylephrine: 9.9 mL    PRBCs (Packed Red Blood Cells): 700 mL    Sodium Chloride 0.9% Bolus: 1500 mL  Total IN: 4883.2 mL    OUT:    Blood Loss (mL): 50 mL    Bulb (mL): 60 mL    Indwelling Catheter - Urethral (mL): 400 mL    Voided (mL): 750 mL  Total OUT: 1260 mL    Total NET: 3623.2 mL        cefTRIAXone   IVPB      cefTRIAXone   IVPB 1000 milliGRAM(s) IV Intermittent every 24 hours  metroNIDAZOLE  IVPB      metroNIDAZOLE  IVPB 500 milliGRAM(s) IV Intermittent every 8 hours  pantoprazole  Injectable 40 milliGRAM(s) IV Push daily      Physical Exam  General: AAOx3, No acute distress  Skin: No jaundice, no icterus  Abdomen: soft, nondistended, incisional TTP, WALI in place, output sanguinous  : Normal external genitalia, kendall in place, UO blood tinged   Extremities: non edematous, no calf pain bilaterally        Labs:                        9.7    38.19 )-----------( 292      ( 19 Jun 2022 07:41 )             27.9     06-19    139  |  112<H>  |  12  ----------------------------<  143<H>  4.4   |  19<L>  |  0.86    Ca    7.1<L>      19 Jun 2022 06:12  Phos  3.0     06-19  Mg     1.5     06-19    TPro  4.9<L>  /  Alb  1.4<L>  /  TBili  0.9  /  DBili  x   /  AST  27  /  ALT  21  /  AlkPhos  72  06-19

## 2022-06-19 NOTE — PROCEDURE NOTE - NSICDXPROCEDURE_GEN_ALL_CORE_FT
PROCEDURES:  Insertion of intravenous catheter in adult patient 19-Jun-2022 14:43:08 4 [place] 20cm Midlines Catheter placed with sterile technique under ultrasound guidence by healthcare provider for purpose of: antibiotic adminstration. Alexei Mims

## 2022-06-19 NOTE — PROGRESS NOTE ADULT - SUBJECTIVE AND OBJECTIVE BOX
Surgery Postop Note:    S/P Ex Lap, Bladder repair    S:     Patient resting. Appears weak.  Pain well controlled. No N/V    O:   Gen: A&Ox3. NAD  Abd:  Soft, nondistended, dressing C/D/I, WALI 20CC's serosang.  + incisional tenderness    VS:    T(C): 36.6 (06-19-22 @ 00:00), Max: 37.7 (06-18-22 @ 20:29)  HR: 132 (06-19-22 @ 00:15) (97 - 132)  BP: 85/52 (06-19-22 @ 00:15) (82/566 - 114/70)  RR: 20 (06-19-22 @ 00:15) (16 - 30)  SpO2: 98% (06-19-22 @ 00:15) (94% - 98%)  Wt(kg): --    I/O's   06-18 @ 07:01  -  06-19 @ 00:47  --------------------------------------------------------  IN:    Lactated Ringers: 1000 mL    Lactated Ringers Bolus: 1500 mL    Norepinephrine: 12.2 mL  Total IN: 2512.2 mL    OUT:    Blood Loss (mL): 50 mL    Indwelling Catheter - Urethral (mL): 260 mL    Voided (mL): 750 mL  Total OUT: 1060 mL    Total NET: 1452.2 mL          Meds: cefTRIAXone   IVPB      cefTRIAXone   IVPB 1000 milliGRAM(s) IV Intermittent every 24 hours  influenza   Vaccine 0.5 milliLiter(s) IntraMuscular once  lactated ringers. 1000 milliLiter(s) IV Continuous <Continuous>  morphine  - Injectable 4 milliGRAM(s) IV Push every 4 hours PRN  norepinephrine Infusion 0.05 MICROgram(s)/kG/Min IV Continuous <Continuous>  ondansetron Injectable 4 milliGRAM(s) IV Push every 6 hours PRN  pantoprazole  Injectable 40 milliGRAM(s) IV Push daily      Labs:                             12.2   18.32 )-----------( 384      ( 18 Jun 2022 16:53 )             36.1     06-18    137  |  107  |  13  ----------------------------<  94  3.9   |  23  |  1.44<H>    Ca    8.6      18 Jun 2022 16:53    TPro  6.7  /  Alb  2.0<L>  /  TBili  0.5  /  DBili  x   /  AST  19  /  ALT  28  /  AlkPhos  75  06-18    LIVER FUNCTIONS - ( 18 Jun 2022 16:53 )  Alb: 2.0 g/dL / Pro: 6.7 g/dL / ALK PHOS: 75 U/L / ALT: 28 U/L DA / AST: 19 U/L / GGT: x

## 2022-06-20 DIAGNOSIS — N99.72 ACCIDENTAL PUNCTURE AND LACERATION OF A GENITOURINARY SYSTEM ORGAN OR STRUCTURE DURING OTHER PROCEDURE: ICD-10-CM

## 2022-06-20 DIAGNOSIS — A41.9 SEPSIS, UNSPECIFIED ORGANISM: ICD-10-CM

## 2022-06-20 LAB
ALBUMIN SERPL ELPH-MCNC: 1.8 G/DL — LOW (ref 3.5–5)
ALP SERPL-CCNC: 57 U/L — SIGNIFICANT CHANGE UP (ref 40–120)
ALT FLD-CCNC: 17 U/L DA — SIGNIFICANT CHANGE UP (ref 10–60)
ANION GAP SERPL CALC-SCNC: 5 MMOL/L — SIGNIFICANT CHANGE UP (ref 5–17)
ANION GAP SERPL CALC-SCNC: 7 MMOL/L — SIGNIFICANT CHANGE UP (ref 5–17)
AST SERPL-CCNC: 23 U/L — SIGNIFICANT CHANGE UP (ref 10–40)
BASOPHILS # BLD AUTO: 0.04 K/UL — SIGNIFICANT CHANGE UP (ref 0–0.2)
BASOPHILS NFR BLD AUTO: 0.2 % — SIGNIFICANT CHANGE UP (ref 0–2)
BILIRUB SERPL-MCNC: 0.8 MG/DL — SIGNIFICANT CHANGE UP (ref 0.2–1.2)
BUN SERPL-MCNC: 10 MG/DL — SIGNIFICANT CHANGE UP (ref 7–18)
BUN SERPL-MCNC: 11 MG/DL — SIGNIFICANT CHANGE UP (ref 7–18)
CALCIUM SERPL-MCNC: 7.6 MG/DL — LOW (ref 8.4–10.5)
CALCIUM SERPL-MCNC: 7.6 MG/DL — LOW (ref 8.4–10.5)
CHLORIDE SERPL-SCNC: 109 MMOL/L — HIGH (ref 96–108)
CHLORIDE SERPL-SCNC: 110 MMOL/L — HIGH (ref 96–108)
CO2 SERPL-SCNC: 24 MMOL/L — SIGNIFICANT CHANGE UP (ref 22–31)
CO2 SERPL-SCNC: 24 MMOL/L — SIGNIFICANT CHANGE UP (ref 22–31)
CREAT SERPL-MCNC: 0.43 MG/DL — LOW (ref 0.5–1.3)
CREAT SERPL-MCNC: 0.44 MG/DL — LOW (ref 0.5–1.3)
CULTURE RESULTS: SIGNIFICANT CHANGE UP
EGFR: 120 ML/MIN/1.73M2 — SIGNIFICANT CHANGE UP
EGFR: 121 ML/MIN/1.73M2 — SIGNIFICANT CHANGE UP
EOSINOPHIL # BLD AUTO: 0 K/UL — SIGNIFICANT CHANGE UP (ref 0–0.5)
EOSINOPHIL NFR BLD AUTO: 0 % — SIGNIFICANT CHANGE UP (ref 0–6)
GLUCOSE SERPL-MCNC: 110 MG/DL — HIGH (ref 70–99)
GLUCOSE SERPL-MCNC: 114 MG/DL — HIGH (ref 70–99)
HCT VFR BLD CALC: 23.3 % — LOW (ref 34.5–45)
HCT VFR BLD CALC: 23.7 % — LOW (ref 34.5–45)
HCT VFR BLD CALC: 24.7 % — LOW (ref 34.5–45)
HGB BLD-MCNC: 8.2 G/DL — LOW (ref 11.5–15.5)
HGB BLD-MCNC: 8.5 G/DL — LOW (ref 11.5–15.5)
HGB BLD-MCNC: 8.7 G/DL — LOW (ref 11.5–15.5)
IMM GRANULOCYTES NFR BLD AUTO: 4.2 % — HIGH (ref 0–1.5)
LYMPHOCYTES # BLD AUTO: 1.6 K/UL — SIGNIFICANT CHANGE UP (ref 1–3.3)
LYMPHOCYTES # BLD AUTO: 6 % — LOW (ref 13–44)
MAGNESIUM SERPL-MCNC: 2.6 MG/DL — SIGNIFICANT CHANGE UP (ref 1.6–2.6)
MAGNESIUM SERPL-MCNC: 2.6 MG/DL — SIGNIFICANT CHANGE UP (ref 1.6–2.6)
MCHC RBC-ENTMCNC: 29.1 PG — SIGNIFICANT CHANGE UP (ref 27–34)
MCHC RBC-ENTMCNC: 29.5 PG — SIGNIFICANT CHANGE UP (ref 27–34)
MCHC RBC-ENTMCNC: 29.8 PG — SIGNIFICANT CHANGE UP (ref 27–34)
MCHC RBC-ENTMCNC: 35.2 GM/DL — SIGNIFICANT CHANGE UP (ref 32–36)
MCHC RBC-ENTMCNC: 35.2 GM/DL — SIGNIFICANT CHANGE UP (ref 32–36)
MCHC RBC-ENTMCNC: 35.9 GM/DL — SIGNIFICANT CHANGE UP (ref 32–36)
MCV RBC AUTO: 82.3 FL — SIGNIFICANT CHANGE UP (ref 80–100)
MCV RBC AUTO: 82.6 FL — SIGNIFICANT CHANGE UP (ref 80–100)
MCV RBC AUTO: 84.6 FL — SIGNIFICANT CHANGE UP (ref 80–100)
MONOCYTES # BLD AUTO: 0.79 K/UL — SIGNIFICANT CHANGE UP (ref 0–0.9)
MONOCYTES NFR BLD AUTO: 3 % — SIGNIFICANT CHANGE UP (ref 2–14)
NEUTROPHILS # BLD AUTO: 22.96 K/UL — HIGH (ref 1.8–7.4)
NEUTROPHILS NFR BLD AUTO: 86.6 % — HIGH (ref 43–77)
NRBC # BLD: 0 /100 WBCS — SIGNIFICANT CHANGE UP (ref 0–0)
ORGANISM # SPEC MICROSCOPIC CNT: SIGNIFICANT CHANGE UP
ORGANISM # SPEC MICROSCOPIC CNT: SIGNIFICANT CHANGE UP
PHOSPHATE SERPL-MCNC: 1.3 MG/DL — LOW (ref 2.5–4.5)
PHOSPHATE SERPL-MCNC: 2.8 MG/DL — SIGNIFICANT CHANGE UP (ref 2.5–4.5)
PLATELET # BLD AUTO: 255 K/UL — SIGNIFICANT CHANGE UP (ref 150–400)
PLATELET # BLD AUTO: 271 K/UL — SIGNIFICANT CHANGE UP (ref 150–400)
PLATELET # BLD AUTO: 287 K/UL — SIGNIFICANT CHANGE UP (ref 150–400)
POTASSIUM SERPL-MCNC: 3.8 MMOL/L — SIGNIFICANT CHANGE UP (ref 3.5–5.3)
POTASSIUM SERPL-MCNC: 4 MMOL/L — SIGNIFICANT CHANGE UP (ref 3.5–5.3)
POTASSIUM SERPL-SCNC: 3.8 MMOL/L — SIGNIFICANT CHANGE UP (ref 3.5–5.3)
POTASSIUM SERPL-SCNC: 4 MMOL/L — SIGNIFICANT CHANGE UP (ref 3.5–5.3)
PROT SERPL-MCNC: 5.2 G/DL — LOW (ref 6–8.3)
RBC # BLD: 2.82 M/UL — LOW (ref 3.8–5.2)
RBC # BLD: 2.88 M/UL — LOW (ref 3.8–5.2)
RBC # BLD: 2.92 M/UL — LOW (ref 3.8–5.2)
RBC # FLD: 15.4 % — HIGH (ref 10.3–14.5)
RBC # FLD: 16.2 % — HIGH (ref 10.3–14.5)
RBC # FLD: 16.3 % — HIGH (ref 10.3–14.5)
SODIUM SERPL-SCNC: 139 MMOL/L — SIGNIFICANT CHANGE UP (ref 135–145)
SODIUM SERPL-SCNC: 140 MMOL/L — SIGNIFICANT CHANGE UP (ref 135–145)
SPECIMEN SOURCE: SIGNIFICANT CHANGE UP
WBC # BLD: 23.37 K/UL — HIGH (ref 3.8–10.5)
WBC # BLD: 26.07 K/UL — HIGH (ref 3.8–10.5)
WBC # BLD: 26.5 K/UL — HIGH (ref 3.8–10.5)
WBC # FLD AUTO: 23.37 K/UL — HIGH (ref 3.8–10.5)
WBC # FLD AUTO: 26.07 K/UL — HIGH (ref 3.8–10.5)
WBC # FLD AUTO: 26.5 K/UL — HIGH (ref 3.8–10.5)

## 2022-06-20 PROCEDURE — 74174 CTA ABD&PLVS W/CONTRAST: CPT | Mod: 26

## 2022-06-20 PROCEDURE — 71275 CT ANGIOGRAPHY CHEST: CPT | Mod: 26

## 2022-06-20 RX ORDER — POTASSIUM PHOSPHATE, MONOBASIC POTASSIUM PHOSPHATE, DIBASIC 236; 224 MG/ML; MG/ML
15 INJECTION, SOLUTION INTRAVENOUS ONCE
Refills: 0 | Status: DISCONTINUED | OUTPATIENT
Start: 2022-06-20 | End: 2022-06-20

## 2022-06-20 RX ORDER — POTASSIUM PHOSPHATE, MONOBASIC POTASSIUM PHOSPHATE, DIBASIC 236; 224 MG/ML; MG/ML
30 INJECTION, SOLUTION INTRAVENOUS ONCE
Refills: 0 | Status: COMPLETED | OUTPATIENT
Start: 2022-06-20 | End: 2022-06-20

## 2022-06-20 RX ORDER — FUROSEMIDE 40 MG
20 TABLET ORAL ONCE
Refills: 0 | Status: COMPLETED | OUTPATIENT
Start: 2022-06-20 | End: 2022-06-20

## 2022-06-20 RX ORDER — ALBUMIN HUMAN 25 %
50 VIAL (ML) INTRAVENOUS ONCE
Refills: 0 | Status: COMPLETED | OUTPATIENT
Start: 2022-06-20 | End: 2022-06-20

## 2022-06-20 RX ADMIN — Medication 62.5 MILLIMOLE(S): at 09:33

## 2022-06-20 RX ADMIN — CEFTRIAXONE 100 MILLIGRAM(S): 500 INJECTION, POWDER, FOR SOLUTION INTRAMUSCULAR; INTRAVENOUS at 15:33

## 2022-06-20 RX ADMIN — SENNA PLUS 2 TABLET(S): 8.6 TABLET ORAL at 21:01

## 2022-06-20 RX ADMIN — MORPHINE SULFATE 3 MILLIGRAM(S): 50 CAPSULE, EXTENDED RELEASE ORAL at 17:00

## 2022-06-20 RX ADMIN — MORPHINE SULFATE 3 MILLIGRAM(S): 50 CAPSULE, EXTENDED RELEASE ORAL at 07:02

## 2022-06-20 RX ADMIN — POTASSIUM PHOSPHATE, MONOBASIC POTASSIUM PHOSPHATE, DIBASIC 83.33 MILLIMOLE(S): 236; 224 INJECTION, SOLUTION INTRAVENOUS at 06:54

## 2022-06-20 RX ADMIN — Medication 50 MILLILITER(S): at 18:52

## 2022-06-20 RX ADMIN — POLYETHYLENE GLYCOL 3350 17 GRAM(S): 17 POWDER, FOR SOLUTION ORAL at 09:47

## 2022-06-20 RX ADMIN — MORPHINE SULFATE 3 MILLIGRAM(S): 50 CAPSULE, EXTENDED RELEASE ORAL at 16:55

## 2022-06-20 RX ADMIN — Medication 5 MILLIGRAM(S): at 09:38

## 2022-06-20 RX ADMIN — Medication 1000 MILLIGRAM(S): at 00:29

## 2022-06-20 RX ADMIN — Medication 100 MILLIGRAM(S): at 14:08

## 2022-06-20 RX ADMIN — Medication 100 MILLIGRAM(S): at 21:04

## 2022-06-20 RX ADMIN — PANTOPRAZOLE SODIUM 40 MILLIGRAM(S): 20 TABLET, DELAYED RELEASE ORAL at 11:27

## 2022-06-20 RX ADMIN — MORPHINE SULFATE 3 MILLIGRAM(S): 50 CAPSULE, EXTENDED RELEASE ORAL at 05:31

## 2022-06-20 RX ADMIN — OXYCODONE HYDROCHLORIDE 5 MILLIGRAM(S): 5 TABLET ORAL at 09:47

## 2022-06-20 RX ADMIN — Medication 20 MILLIGRAM(S): at 20:57

## 2022-06-20 RX ADMIN — Medication 100 MILLIGRAM(S): at 05:18

## 2022-06-20 RX ADMIN — MORPHINE SULFATE 3 MILLIGRAM(S): 50 CAPSULE, EXTENDED RELEASE ORAL at 00:29

## 2022-06-20 RX ADMIN — MORPHINE SULFATE 3 MILLIGRAM(S): 50 CAPSULE, EXTENDED RELEASE ORAL at 23:29

## 2022-06-20 RX ADMIN — CHLORHEXIDINE GLUCONATE 1 APPLICATION(S): 213 SOLUTION TOPICAL at 05:21

## 2022-06-20 RX ADMIN — OXYCODONE HYDROCHLORIDE 5 MILLIGRAM(S): 5 TABLET ORAL at 09:34

## 2022-06-20 RX ADMIN — Medication 30 MILLILITER(S): at 05:35

## 2022-06-20 RX ADMIN — Medication 1000 MILLIGRAM(S): at 23:27

## 2022-06-20 NOTE — CONSULT NOTE ADULT - CONSULT REASON
Bacteroides Bacteremia/ sepsis / pelvic abscess
Septic shock
bladder perforation s/p hysterectomy POD#10

## 2022-06-20 NOTE — PROGRESS NOTE ADULT - ASSESSMENT
urosepsis on antibiotics   s/p exp lap bladder repair 06/18  s/p blood transfusion 06/18-06/19  s/p TLH BS 06/08     continue ICU management  waiting for CT angio   monitor I/O and VS

## 2022-06-20 NOTE — PROGRESS NOTE ADULT - SUBJECTIVE AND OBJECTIVE BOX
INTERVAL HPI/OVERNIGHT EVENTS:  Pt reports improvement in pain. Pt being transfused 1u pRBC. Last reported bm     PRESSORS: [ ] YES [ ] NO  WHICH:    ANTIBIOTICS:                      Antimicrobial:  cefTRIAXone   IVPB      cefTRIAXone   IVPB 1000 milliGRAM(s) IV Intermittent every 24 hours  metroNIDAZOLE  IVPB      metroNIDAZOLE  IVPB 500 milliGRAM(s) IV Intermittent every 8 hours    Cardiovascular:    Pulmonary:    Hematalogic:    Other:  acetaminophen     Tablet .. 1000 milliGRAM(s) Oral every 8 hours  aluminum hydroxide/magnesium hydroxide/simethicone Suspension 30 milliLiter(s) Oral every 6 hours PRN  bisacodyl 5 milliGRAM(s) Oral daily PRN  chlorhexidine 2% Cloths 1 Application(s) Topical <User Schedule>  influenza   Vaccine 0.5 milliLiter(s) IntraMuscular once  lactated ringers. 1000 milliLiter(s) IV Continuous <Continuous>  morphine  - Injectable 3 milliGRAM(s) IV Push every 3 hours PRN  naloxone Injectable 0.4 milliGRAM(s) IV Push once  ondansetron Injectable 4 milliGRAM(s) IV Push every 6 hours PRN  oxyCODONE    IR 2.5 milliGRAM(s) Oral every 4 hours PRN  oxyCODONE    IR 5 milliGRAM(s) Oral every 4 hours PRN  pantoprazole  Injectable 40 milliGRAM(s) IV Push daily  polyethylene glycol 3350 17 Gram(s) Oral daily  senna 2 Tablet(s) Oral at bedtime  sodium chloride 0.9% lock flush 10 milliLiter(s) IV Push every 1 hour PRN    acetaminophen     Tablet .. 1000 milliGRAM(s) Oral every 8 hours  aluminum hydroxide/magnesium hydroxide/simethicone Suspension 30 milliLiter(s) Oral every 6 hours PRN  bisacodyl 5 milliGRAM(s) Oral daily PRN  cefTRIAXone   IVPB      cefTRIAXone   IVPB 1000 milliGRAM(s) IV Intermittent every 24 hours  chlorhexidine 2% Cloths 1 Application(s) Topical <User Schedule>  influenza   Vaccine 0.5 milliLiter(s) IntraMuscular once  lactated ringers. 1000 milliLiter(s) IV Continuous <Continuous>  metroNIDAZOLE  IVPB      metroNIDAZOLE  IVPB 500 milliGRAM(s) IV Intermittent every 8 hours  morphine  - Injectable 3 milliGRAM(s) IV Push every 3 hours PRN  naloxone Injectable 0.4 milliGRAM(s) IV Push once  ondansetron Injectable 4 milliGRAM(s) IV Push every 6 hours PRN  oxyCODONE    IR 2.5 milliGRAM(s) Oral every 4 hours PRN  oxyCODONE    IR 5 milliGRAM(s) Oral every 4 hours PRN  pantoprazole  Injectable 40 milliGRAM(s) IV Push daily  polyethylene glycol 3350 17 Gram(s) Oral daily  senna 2 Tablet(s) Oral at bedtime  sodium chloride 0.9% lock flush 10 milliLiter(s) IV Push every 1 hour PRN    Drug Dosing Weight  Height (cm): 157.5 (2022 09:54)  Weight (kg): 53.1 (2022 09:54)  BMI (kg/m2): 21.4 (2022 09:54)  BSA (m2): 1.52 (2022 09:54)    CENTRAL LINE: [ ] YES [ ] NO  LOCATION:   DATE INSERTED:  REMOVE: [ ] YES [ ] NO  EXPLAIN:    DIAZ: [ ] YES [ ] NO    DATE INSERTED:  REMOVE:  [ ] YES [ ] NO  EXPLAIN:    A-LINE:  [ ] YES [ ] NO  LOCATION:   DATE INSERTED:  REMOVE:  [ ] YES [ ] NO  EXPLAIN:    PMH -reviewed admission note, no change since admission    ICU Vital Signs Last 24 Hrs  T(C): 37.8 (2022 05:00), Max: 38.1 (2022 15:33)  T(F): 100 (2022 05:00), Max: 100.5 (2022 15:33)  HR: 112 (2022 07:00) (108 - 137)  BP: 114/74 (2022 07:00) (79/42 - 144/83)  BP(mean): 83 (2022 07:00) (47 - 95)  ABP: --  ABP(mean): --  RR: 26 (2022 07:00) (20 - 35)  SpO2: 95% (2022 07:00) (89% - 97%)            06-19 @ 07:01  -  06-20 @ 07:00  --------------------------------------------------------  IN: 5810 mL / OUT: 1320 mL / NET: 4490 mL            PHYSICAL EXAM:    GENERAL: NAD, well-groomed, well-developed  HEAD:  Atraumatic, Normocephalic  EYES: EOMI, PERRLA, conjunctiva and sclera clear  ENMT: No tonsillar erythema, exudates, or enlargement; Moist mucous membranes, Good dentition, No lesions  NECK: Supple, normal appearance, No JVD; Normal thyroid; Trachea midline  NERVOUS SYSTEM:  Alert & Oriented X3, Good concentration; Motor Strength 5/5 B/L upper and lower extremities; DTRs 2+ intact and symmetric  CHEST/LUNG: No chest deformity; Normal percussion bilaterally; No rales, rhonchi, wheezing   HEART: tachycardic rate; No murmurs, rubs, or gallops  ABDOMEN: Soft, Nontender, Nondistended; Bowel sounds present  EXTREMITIES:  2+ Peripheral Pulses, No clubbing, cyanosis, or edema  LYMPH: No lymphadenopathy noted  SKIN: No rashes or lesions; Good capillary refill      LABS:  CBC Full  -  ( 2022 04:43 )  WBC Count : 26.50 K/uL  RBC Count : 2.88 M/uL  Hemoglobin : 8.5 g/dL  Hematocrit : 23.7 %  Platelet Count - Automated : 255 K/uL  Mean Cell Volume : 82.3 fl  Mean Cell Hemoglobin : 29.5 pg  Mean Cell Hemoglobin Concentration : 35.9 gm/dL  Auto Neutrophil # : 22.96 K/uL  Auto Lymphocyte # : 1.60 K/uL  Auto Monocyte # : 0.79 K/uL  Auto Eosinophil # : 0.00 K/uL  Auto Basophil # : 0.04 K/uL  Auto Neutrophil % : 86.6 %  Auto Lymphocyte % : 6.0 %  Auto Monocyte % : 3.0 %  Auto Eosinophil % : 0.0 %  Auto Basophil % : 0.2 %    06-20    139  |  110<H>  |  11  ----------------------------<  114<H>  3.8   |  24  |  0.44<L>    Ca    7.6<L>      2022 04:43  Phos  1.3     06-20  Mg     2.6     06-20    TPro  5.2<L>  /  Alb  1.8<L>  /  TBili  0.8  /  DBili  x   /  AST  23  /  ALT  17  /  AlkPhos  57  06-20      Urinalysis Basic - ( 2022 12:53 )    Color: Yellow / Appearance: Slightly Turbid / S.020 / pH: x  Gluc: x / Ketone: Small  / Bili: Negative / Urobili: Negative   Blood: x / Protein: 100 / Nitrite: Negative   Leuk Esterase: Moderate / RBC: >50 /HPF / WBC >50 /HPF   Sq Epi: x / Non Sq Epi: Negative /HPF / Bacteria: Few /HPF      Culture Results:   No growth ( @ 18:59)  Culture Results:   Growth in anaerobic bottle: Gram Negative Rods  ***Blood Panel PCR results on this specimen are available  approximately 3 hours after the Gram stain result.***  Gram stain, PCR, and/or culture results may not always  correspond due to difference in methodologies.  ************************************************************  This PCR assay was performed by multiplex PCR. This  Assay tests for 66 bacterial and resistance gene targets.  Please refer to the [facility] test directory  at [email or web address] for details. ( @ 18:54)  Culture Results:   No growth to date. ( @ 18:45)  Culture Results:   No growth ( @ 18:41)      RADIOLOGY & ADDITIONAL STUDIES REVIEWED:  ***    GOALS OF CARE DISCUSSION WITH PATIENT/FAMILY/PROXY:    CRITICAL CARE TIME SPENT: 35 minutes

## 2022-06-20 NOTE — PROGRESS NOTE ADULT - SUBJECTIVE AND OBJECTIVE BOX
Interval events:     SUBJECTIVE:      OBJECTIVE:  Vital Signs Last 24 Hrs  T(C): 37.8 (20 Jun 2022 05:00), Max: 38.1 (19 Jun 2022 15:33)  T(F): 100 (20 Jun 2022 05:00), Max: 100.5 (19 Jun 2022 15:33)  HR: 109 (20 Jun 2022 06:00) (108 - 138)  BP: 124/77 (20 Jun 2022 06:00) (79/42 - 144/83)  BP(mean): 86 (20 Jun 2022 06:00) (47 - 95)  RR: 22 (20 Jun 2022 06:00) (20 - 35)  SpO2: 95% (20 Jun 2022 06:00) (89% - 97%)    Physical Examination:  GEN: NAD, resting quietly  PULM: symmetric chest rise bilaterally, no increased WOB  ABD: soft  :       LABS:                        8.5    26.50 )-----------( 255      ( 20 Jun 2022 04:43 )             23.7       06-20    139  |  110<H>  |  11  ----------------------------<  114<H>  3.8   |  24  |  0.44<L>    Ca    7.6<L>      20 Jun 2022 04:43  Phos  1.3     06-20  Mg     2.6     06-20    TPro  5.2<L>  /  Alb  1.8<L>  /  TBili  0.8  /  DBili  x   /  AST  23  /  ALT  17  /  AlkPhos  57  06-20           Interval events:   No overnight events     SUBJECTIVE:  Sleepy. Reports some mild pain     OBJECTIVE:  Vital Signs Last 24 Hrs  T(C): 37.8 (20 Jun 2022 05:00), Max: 38.1 (19 Jun 2022 15:33)  T(F): 100 (20 Jun 2022 05:00), Max: 100.5 (19 Jun 2022 15:33)  HR: 109 (20 Jun 2022 06:00) (108 - 138)  BP: 124/77 (20 Jun 2022 06:00) (79/42 - 144/83)  BP(mean): 86 (20 Jun 2022 06:00) (47 - 95)  RR: 22 (20 Jun 2022 06:00) (20 - 35)  SpO2: 95% (20 Jun 2022 06:00) (89% - 97%)    Physical Examination:  GEN: NAD, resting quietly  PULM: symmetric chest rise bilaterally, no increased WOB  ABD: soft  : kendall secured, urine yellow       LABS:                        8.5    26.50 )-----------( 255      ( 20 Jun 2022 04:43 )             23.7       06-20    139  |  110<H>  |  11  ----------------------------<  114<H>  3.8   |  24  |  0.44<L>    Ca    7.6<L>      20 Jun 2022 04:43  Phos  1.3     06-20  Mg     2.6     06-20    TPro  5.2<L>  /  Alb  1.8<L>  /  TBili  0.8  /  DBili  x   /  AST  23  /  ALT  17  /  AlkPhos  57  06-20

## 2022-06-20 NOTE — PROGRESS NOTE ADULT - PROBLEM SELECTOR PLAN 1
A/P: 45yo admitted with acute onset severe abdominal pain, code sepsis in ED noted to have possible abscess and suspected right ovarian vein thrombosis POD #12 s/p TLH/BS; noted to have urinary retention HD #1 ct urogram showing bladder perforation with extravasation of contrast into peritoneal cavity now POD #2 s/p ex-laparotomy with bladder laceration repair; admitted to icu for septic shock secondary to urosepsis; pt continues to be tachycardic, tachypnic, bp wnl without requiring pressors; s/p 3u prbc, 2u albumin  -cont ceftriaxone, flagyl per ID, pt febrile overnight   -consider re-imaging, will discuss with ICU team    -cont nasal cannula, IS use encouraged   -RIJ central line in place, picc line in place  -cont kendall for 2 weeks per urology - strict I/Os  -cont serial cbc, transfuse prn   -cont pain management  -follow up repeat labs  -cont to monitor bowel function, consider npo diet if pt continues to be distended without flatus   -DVT ppx: venodynes, recommend pharmacological anticoagulation per icu and uro   -management per ICU team   -d/w Dr. Patterson

## 2022-06-20 NOTE — PROGRESS NOTE ADULT - ASSESSMENT
A/P: 47yo admitted with acute onset severe abdominal pain, code sepsis in ED noted to have possible abscess and suspected right ovarian vein thrombosis POD #12 s/p TLH/BS; noted to have urinary retention HD #1 ct urogram showing bladder perforation with extravasation of contrast into peritoneal cavity now POD #2 s/p ex-laparotomy with bladder laceration repair; admitted to icu for septic shock secondary to urosepsis; pt continues to be tachycardic, tachypnic, bp wnl without requiring pressors; s/p 3u prbc, 2u albumin  -cont ceftriaxone, flagyl per ID, pt febrile overnight   -consider re-imaging, will discuss with ICU team    -cont nasal cannula, IS use encouraged   -RIJ central line in place, picc line in place  -cont kendall for 2 weeks per urology - strict I/Os  -cont serial cbc, transfuse prn   -cont pain management  -follow up repeat labs  -cont to monitor bowel function, consider npo diet if pt continues to be distended without flatus   -DVT ppx: venodynes, recommend pharmacological anticoagulation per icu and uro   -management per ICU team   -d/w Dr. Patterson

## 2022-06-20 NOTE — CONSULT NOTE ADULT - ASSESSMENT
Sepsis  Bacteremia - with B Fragilis  Fevers - low grade  Leukocytosis - slowly decreasing    Plan - Cont Rocephin 1 gm iv q24hrs ( considering she had a bladder perforation , she should be covered for gram negative organisms also)  Cont Flagyl 500mgs iv q8hrs  repeat blood cultures.

## 2022-06-20 NOTE — PROGRESS NOTE ADULT - ASSESSMENT
ASSESSMENT & PLAN:  46-year-old woman with history of a recent hysterectomy (6/8/22) for a cyst, presented to  on 6/17 with abdominal pain and fevers, found to have septic shock related to B. Fragilis bacteremia from a bladder perforation with peritoneal extravasation, emergent OR repaid of perforation with abdominal washout (6/18), admitted to ICU postoperatively for hypotension. Course complicated by acute blood loss anemia post operative and worsening septic shock requiring vasopressor support.      ACTIVE ISSUES:  #Septic Shock secondary to B. Fragilis Bacteremia  #Hemorrhagic Shock  #Bladder Perforation status post OR Repair (6/18)  #Pelvic Fluid Collection/Phlegmon Formation status post Washout in OR (6/18)  #Acute Pre-Renal Nonoliguric Renal Failure  #?Ovarian Vein Thrombosis     PLAN:  Neurologic  #Pain Control  - Morphine IVP PRN for postoperative pain.   - If continued renal function can add Toradol IVP.     Pulmonary:  #No Acute Issues  - Received a large volume of IVF during resuscitation monitor closely for development of pulmonary edema.   - Lung POCUS currently with Laurel, gabe B lines.   - Continuous pulse ox monitoring.     Cardiovascular  #Septic Shock secondary to B. Fragilis Bacteremia  #Hemorrhagic Shock  - Severe hypotension postoperatively from sepsis and hypovolemia.   - Received large volume IVF and PRBC x2 with improvement.   - Off vasopressors.   - Lactate normalized   - TTE ordered and pending.     GI  #Nutrition  - Cleared for clear diet by surgery attending,  advance diet per surgery team.   - Monitor for gas/bowel movement.   - Pantoprazole 40mg IVP daily.     Renal//GYN  #Bladder Perforation with Pelvic Fluid Collection  - Discovery of 8mm bladder perforation on CT scan and status post OR repair and washout (6/18).   - Lora draining dark maroon colored urine. Monitor for clots.   - Maintain Lora in place.   - Urology Dr. Mitchell following.   - GYN Dr. Patterson following.     #Acute Pre-Renal Nonoliguric Renal Failure  - Pre-renal secondary to hypovolemia and hypotension.   - Resolving.   - Monitor renal function.     ID  B. Fragilis Bacteremia  - Blood cultures (6/18) resulted with B. Fragilis, urosepsis related to pelvic fluid collection from bladder perforation.   - Antibiotic coverage with Flagyl (6/18 - ?) and Ceftriaxone (6/17 - ?).  - Repeat cultures to evaluate for clearance   - ID Dr. Erica following.   - Midline catheter in place for antibiotic administration.     Heme/Onc  #Acute Blood Loss Induce Anemia  #Hemorrhagic Shock  - Status post 3 units PRBC. Closely monitor CBC   - Was on Lovenox prior to OR due to possible venous thrombus, surgery team reports oozing during class, likely contributing to blood loss.   - Hold all anticoagulation/antiplatelet agents.   #?Ovarian Vein Thrombosis   - CT (6/17) with "Question central filling defect within the right ovarian vein distally versus interdigitating fluid between branches. MRV can be obtained to   further evaluate for ovarian vein thrombosis."  - Had received full dose Lovenox, no additional doses due to active bleeding.   - Once acute issues are stabilized could benefit from further thrombus evaluation, may end up needing anticoagulation therapy.   #VTE Prophylaxis  - SCDs.   - Hold chemical VTE prophylaxis due to bleeding.     Endocrine  #No Acute Issues    Ethics/Disposition  - Full Code  - Maintain in ICU

## 2022-06-20 NOTE — CONSULT NOTE ADULT - REASON FOR ADMISSION
pelvic abscess and fever s/p hysterectomy 6/8/2022

## 2022-06-20 NOTE — PROGRESS NOTE ADULT - SUBJECTIVE AND OBJECTIVE BOX
Patient is stable. Pain is well controlled. On clear diet, passing gas.   Labs are stable today   Awaiting CT angio   Urine output is adequate, and more clear looking  Bertin drain has  bloody small amount

## 2022-06-20 NOTE — PROGRESS NOTE ADULT - SUBJECTIVE AND OBJECTIVE BOX
Patient seen at bedside resting c/o incisional pain. Kendall in place draining concentrated yellow urine, no flatus, no bowel movement; tolerating sips of clears. Pt admits to feeling very tired and weak. Pt denies headache, weakness, chest pain, SOB, nausea, vomitting, diarrhea, dizziness, palpitations, vaginal bleeding, dysuria, hematuria or any other complaints.    LR @ 150 cc/hr     Vital Signs Last 24 Hrs  T(C): 37.8 (20 Jun 2022 05:00), Max: 38.1 (19 Jun 2022 15:33)  T(F): 100 (20 Jun 2022 05:00), Max: 100.5 (19 Jun 2022 15:33)  HR: 110 (20 Jun 2022 08:00) (108 - 137)  BP: 118/74 (20 Jun 2022 08:00) (79/42 - 144/83)  BP(mean): 84 (20 Jun 2022 08:00) (47 - 95)  RR: 23 (20 Jun 2022 08:00) (20 - 35)  SpO2: 95% (20 Jun 2022 08:00) (89% - 97%)    Gen: A&O x 3, NAD  Chest: CTA B/L  Cardiac: S1,S2  tachycardic   Abdomen: soft; appropriate incisional tenderness to palpation, moderately distended, dressing in place C/D/I   Gyn: no vaginal bleeding noted, kendall in place draining concentrated yellow urine   Extremities: Nontender, no calf tenderness, edematous, no pitting edema noted                          8.5    26.50 )-----------( 255      ( 20 Jun 2022 04:43 )             23.7     06-20    139  |  110<H>  |  11  ----------------------------<  114<H>  3.8   |  24  |  0.44<L>    Ca    7.6<L>      20 Jun 2022 04:43  Phos  1.3     06-20  Mg     2.6     06-20    TPro  5.2<L>  /  Alb  1.8<L>  /  TBili  0.8  /  DBili  x   /  AST  23  /  ALT  17  /  AlkPhos  57  06-20    all imaging and labs reviewed     Urine culture 6/17 no growth to date  Blood culture 6/17 x 1 no growth to date  Blood culture 6/17 #2 gram negative rods, b. fragilis

## 2022-06-20 NOTE — CONSULT NOTE ADULT - GASTROINTESTINAL
soft/nondistended/normal active bowel sounds/no guarding/no rigidity/no organomegaly/no masses palpable/tender details…

## 2022-06-20 NOTE — CONSULT NOTE ADULT - SUBJECTIVE AND OBJECTIVE BOX
HPI:  45yo female P2, s/p TLH/BS on  with Dr. Patterson  presents to ED via ambulance c/o severe epigastric pain since this morning at 6am. Fever 103 upon arrival, s/p code sepsis.  Per daughter, patient went to void and developed sudden onset of sharp abdominal pains and needed assistance to get back to bed. +fevers 100.1, nausea/vomiting x1, headache, weakness.  Denies chest pains, sob, palpitations, dysuria, hematuria, worsening vaginal bleeding or leg swelling.    PMH: denies  PSH: BTL, TLH/BS on , uncomplicated with Leonardo Jordan and Adonis Jordan  POBhx:  x2 95, 2002  Pgyn: s/p TLH/BS for chronic pelvic pain and fibroids; denies STDs, abnormal pap smears  Meds: tylenol, motrin as needed for pain  Allergies: NKDA  Social: denies smoking, etoh or drug use      History as above, I had seen this patient yesterday in the ICU but had forgotten  to start a note. She is a patient who I was asked to see as she has sepsis , Bacteremia with Bacteroides , Leukocytosis and fevers, she had a hysterectomy and had a bladder perforation with a kendall that penetrated her bladder. She is currently in the ICU, she is not on any pressors, she is very tachycardic to 130's. She states she is feeling better overall, she still has low grade fevers and a very elevated WBC count. She is on Rocephin and Flagyl.         PAST MEDICAL & SURGICAL HISTORY:  Leiomyoma of uterus      History of bilateral tubal ligation          No Known Allergies      Meds:  acetaminophen     Tablet .. 1000 milliGRAM(s) Oral every 8 hours  aluminum hydroxide/magnesium hydroxide/simethicone Suspension 30 milliLiter(s) Oral every 6 hours PRN  bisacodyl 5 milliGRAM(s) Oral daily PRN  cefTRIAXone   IVPB      cefTRIAXone   IVPB 1000 milliGRAM(s) IV Intermittent every 24 hours  chlorhexidine 2% Cloths 1 Application(s) Topical <User Schedule>  influenza   Vaccine 0.5 milliLiter(s) IntraMuscular once  lactated ringers. 1000 milliLiter(s) IV Continuous <Continuous>  metroNIDAZOLE  IVPB      metroNIDAZOLE  IVPB 500 milliGRAM(s) IV Intermittent every 8 hours  morphine  - Injectable 3 milliGRAM(s) IV Push every 3 hours PRN  naloxone Injectable 0.4 milliGRAM(s) IV Push once  ondansetron Injectable 4 milliGRAM(s) IV Push every 6 hours PRN  oxyCODONE    IR 2.5 milliGRAM(s) Oral every 4 hours PRN  oxyCODONE    IR 5 milliGRAM(s) Oral every 4 hours PRN  pantoprazole  Injectable 40 milliGRAM(s) IV Push daily  polyethylene glycol 3350 17 Gram(s) Oral daily  potassium phosphate IVPB 15 milliMole(s) IV Intermittent once  senna 2 Tablet(s) Oral at bedtime  sodium chloride 0.9% lock flush 10 milliLiter(s) IV Push every 1 hour PRN  sodium phosphate IVPB 15 milliMole(s) IV Intermittent once      SOCIAL HISTORY:  Smoker:  no  ETOH use:  no  Ilicit Drug use:  no      FAMILY HISTORY:  Family history of hypertension (Mother)        VITALS:  Vital Signs Last 24 Hrs  T(C): 37.8 (2022 05:00), Max: 38.1 (2022 15:33)  T(F): 100 (2022 05:00), Max: 100.5 (2022 15:33)  HR: 110 (2022 08:00) (108 - 137)  BP: 118/74 (2022 08:00) (98/69 - 144/83)  BP(mean): 84 (2022 08:00) (70 - 95)  RR: 23 (2022 08:00) (20 - 35)  SpO2: 95% (2022 08:00) (89% - 97%)    LABS/DIAGNOSTIC TESTS:                          8.5    26.50 )-----------( 255      ( 2022 04:43 )             23.7     WBC Count: 26.50 K/uL ( @ 04:43)  WBC Count: 25.41 K/uL ( @ 22:22)  WBC Count: 27.00 K/uL ( @ 20:06)  WBC Count: 34.66 K/uL ( @ 13:34)  WBC Count: 38.19 K/uL ( @ 07:41)  WBC Count: 39.81 K/uL ( @ 06:12)  WBC Count: 39.87 K/uL ( @ 05:59)  WBC Count: 34.25 K/uL ( @ 01:10)  WBC Count: 18.32 K/uL ( @ 16:53)  WBC Count: 19.06 K/uL ( @ 08:47)  WBC Count: 8.04 K/uL ( @ 11:15)          139  |  110<H>  |  11  ----------------------------<  114<H>  3.8   |  24  |  0.44<L>    Ca    7.6<L>      2022 04:43  Phos  1.3       Mg     2.6         TPro  5.2<L>  /  Alb  1.8<L>  /  TBili  0.8  /  DBili  x   /  AST  23  /  ALT  17  /  AlkPhos  57        Urinalysis Basic - ( 2022 12:53 )    Color: Yellow / Appearance: Slightly Turbid / S.020 / pH: x  Gluc: x / Ketone: Small  / Bili: Negative / Urobili: Negative   Blood: x / Protein: 100 / Nitrite: Negative   Leuk Esterase: Moderate / RBC: >50 /HPF / WBC >50 /HPF   Sq Epi: x / Non Sq Epi: Negative /HPF / Bacteria: Few /HPF        LIVER FUNCTIONS - ( 2022 04:43 )  Alb: 1.8 g/dL / Pro: 5.2 g/dL / ALK PHOS: 57 U/L / ALT: 17 U/L DA / AST: 23 U/L / GGT: x                 LACTATE:    ABG -     CULTURES:   Catheterized Catheterized   @ 18:59   No growth  --  --      .Blood Blood   @ 18:54   Growth in anaerobic bottle: Gram Negative Rods      .Blood Blood   @ 18:45   No growth to date.  --  --      Clean Catch Clean Catch (Midstream)   @ 18:41   No growth  --  --          RADIOLOGY:< from: Xray Chest 1 View- PORTABLE-Urgent (Xray Chest 1 View- PORTABLE-Urgent .) (22 @ 01:05) >  ACC: [id number] EXAM:  XR CHEST PORTABLE URGENT 1V                          PROCEDURE DATE:  2022          INTERPRETATION:  CLINICAL INDICATION: 46 years  Female with central line   placement.    COMPARISON: 2022    The right jugular catheter tip overlies the proximal right atrium.    AP view of the chest demonstrates mild right midlung discoid atelectasis.   Left lung is clear.. There is no pleural effusion. The pulmonary   vasculature is normal. There is no pneumothorax.    The heartis normal in size. There is no mediastinal or hilar mass.    The osseous structures are unremarkable.    IMPRESSION:    Right central line in satisfactory position. No pneumothorax.    Mild right midlung discoid atelectasis.    --- End of Report ---            DA SANTORO MD; Attending Radiologist    --------------------------------------------------------------------------------------------------------------------------------------------------------------------------------------------------------------------------------------------  ACC: [id number] EXAM:  CT ABDOMEN AND PELVIS [facility]                          PROCEDURE DATE:  2022          INTERPRETATION:  CLINICAL INFORMATION: Postop day 10 hysterectomy. Pelvic   abscess. Fever.    COMPARISON: Contrast-enhanced CT of the abdomen and pelvis 2022.    CONTRAST/COMPLICATIONS:  IV Contrast: Omnipaque 350  90 cc administered   10 cc discarded  Oral Contrast: NONE  Complications: None reported at time of study completion    PROCEDURE:  CT of the Abdomen and Pelvis was performed.  Sagittal and coronal reformats were performed.    FINDINGS:  LOWER CHEST: There are mild bilateral layering pleural effusions with   moderate bilateral lower lobe dependent compressive atelectasis.    LIVER: Scattered hepatic cysts. Otherwise, within normal limits.  BILE DUCTS: Normal caliber.  GALLBLADDER: Within normal limits.  SPLEEN: Within normal limits.  PANCREAS: Within normal limits.  ADRENALS: Within normal limits.  KIDNEYS/URETERS: There is persistent enhancement of the bilateral renal   cortices and excreted contrast in the renal collecting systems and   ureters excretion of the gallbladder. Otherwise, the kidneys are   symmetric in appearance, enhancement, and excretion.    The renal collecting systems and ureters aresymmetric in opacification   and course.    BLADDER: There is a Kendall catheter extending through an 8 mm defect in   the posterior midline wall on image 1:30, with the balloon inflated   within the peritoneal cavity of the pelvis. There is extravasation of   excreted contrast throughout the peritoneal cavity.  REPRODUCTIVE ORGANS: The uterus and ovaries have been removed.    BOWEL: No bowel obstruction.  PERITONEUM: No ascites.  VESSELS: Within normal limits.  RETROPERITONEUM/LYMPH NODES: No lymphadenopathy. Shotty para-aortic and   paracaval lymph nodes.  ABDOMINAL WALL: Within normal limits.  BONES: Within normal limits.    IMPRESSION: 8 mm perforation of the midline posterior bladder wall   through which extends a Kendall catheter. Free extravasation of excreted   contrast into the peritoneal cavity. Results discussed with YAMILEX Hoffman at   time of interpretation.    --- End of Report ---            CARMENZA POWELL MD; Attending Radiologist  This document has been electronically signed. 2022  3:42PM    < end of copied text >  < end of copied text >        ROS  [  ] UNABLE TO ELICIT               HPI:  47yo female P2, s/p TLH/BS on  with Dr. Patterson  presents to ED via ambulance c/o severe epigastric pain since this morning at 6am. Fever 103 upon arrival, s/p code sepsis.  Per daughter, patient went to void and developed sudden onset of sharp abdominal pains and needed assistance to get back to bed. +fevers 100.1, nausea/vomiting x1, headache, weakness.  Denies chest pains, sob, palpitations, dysuria, hematuria, worsening vaginal bleeding or leg swelling.    PMH: denies  PSH: BTL, TLH/BS on , uncomplicated with Leonardo Jordan and Adonis Jordan  POBhx:  x2 95, 2002  Pgyn: s/p TLH/BS for chronic pelvic pain and fibroids; denies STDs, abnormal pap smears  Meds: tylenol, motrin as needed for pain  Allergies: NKDA  Social: denies smoking, etoh or drug use      History as above, I had seen this patient yesterday in the ICU but had forgotten  to start a note. She is a patient who I was asked to see as she has sepsis , Bacteremia with Bacteroides , Leukocytosis and fevers, she had a hysterectomy and had a bladder perforation with a kendall that penetrated her bladder. She is currently in the ICU, she is not on any pressors, she is very tachycardic to 130's. She states she is feeling better overall, she still has low grade fevers and a very elevated WBC count. She is on Rocephin and Flagyl. She is s/p repair of laceration of bladder.         PAST MEDICAL & SURGICAL HISTORY:  Leiomyoma of uterus      History of bilateral tubal ligation          No Known Allergies      Meds:  acetaminophen     Tablet .. 1000 milliGRAM(s) Oral every 8 hours  aluminum hydroxide/magnesium hydroxide/simethicone Suspension 30 milliLiter(s) Oral every 6 hours PRN  bisacodyl 5 milliGRAM(s) Oral daily PRN  cefTRIAXone   IVPB      cefTRIAXone   IVPB 1000 milliGRAM(s) IV Intermittent every 24 hours  chlorhexidine 2% Cloths 1 Application(s) Topical <User Schedule>  influenza   Vaccine 0.5 milliLiter(s) IntraMuscular once  lactated ringers. 1000 milliLiter(s) IV Continuous <Continuous>  metroNIDAZOLE  IVPB      metroNIDAZOLE  IVPB 500 milliGRAM(s) IV Intermittent every 8 hours  morphine  - Injectable 3 milliGRAM(s) IV Push every 3 hours PRN  naloxone Injectable 0.4 milliGRAM(s) IV Push once  ondansetron Injectable 4 milliGRAM(s) IV Push every 6 hours PRN  oxyCODONE    IR 2.5 milliGRAM(s) Oral every 4 hours PRN  oxyCODONE    IR 5 milliGRAM(s) Oral every 4 hours PRN  pantoprazole  Injectable 40 milliGRAM(s) IV Push daily  polyethylene glycol 3350 17 Gram(s) Oral daily  potassium phosphate IVPB 15 milliMole(s) IV Intermittent once  senna 2 Tablet(s) Oral at bedtime  sodium chloride 0.9% lock flush 10 milliLiter(s) IV Push every 1 hour PRN  sodium phosphate IVPB 15 milliMole(s) IV Intermittent once      SOCIAL HISTORY:  Smoker:  no  ETOH use:  no  Ilicit Drug use:  no      FAMILY HISTORY:  Family history of hypertension (Mother)        VITALS:  Vital Signs Last 24 Hrs  T(C): 37.8 (2022 05:00), Max: 38.1 (2022 15:33)  T(F): 100 (2022 05:00), Max: 100.5 (2022 15:33)  HR: 110 (2022 08:00) (108 - 137)  BP: 118/74 (2022 08:00) (98/69 - 144/83)  BP(mean): 84 (2022 08:00) (70 - 95)  RR: 23 (2022 08:00) (20 - 35)  SpO2: 95% (2022 08:00) (89% - 97%)    LABS/DIAGNOSTIC TESTS:                          8.5    26.50 )-----------( 255      ( 2022 04:43 )             23.7     WBC Count: 26.50 K/uL ( @ 04:43)  WBC Count: 25.41 K/uL ( @ 22:22)  WBC Count: 27.00 K/uL ( @ 20:06)  WBC Count: 34.66 K/uL ( @ 13:34)  WBC Count: 38.19 K/uL ( @ 07:41)  WBC Count: 39.81 K/uL ( @ 06:12)  WBC Count: 39.87 K/uL ( @ 05:59)  WBC Count: 34.25 K/uL ( @ 01:10)  WBC Count: 18.32 K/uL ( @ 16:53)  WBC Count: 19.06 K/uL ( @ 08:47)  WBC Count: 8.04 K/uL ( @ 11:15)          139  |  110<H>  |  11  ----------------------------<  114<H>  3.8   |  24  |  0.44<L>    Ca    7.6<L>      2022 04:43  Phos  1.3       Mg     2.6         TPro  5.2<L>  /  Alb  1.8<L>  /  TBili  0.8  /  DBili  x   /  AST  23  /  ALT  17  /  AlkPhos  57  20      Urinalysis Basic - ( 2022 12:53 )    Color: Yellow / Appearance: Slightly Turbid / S.020 / pH: x  Gluc: x / Ketone: Small  / Bili: Negative / Urobili: Negative   Blood: x / Protein: 100 / Nitrite: Negative   Leuk Esterase: Moderate / RBC: >50 /HPF / WBC >50 /HPF   Sq Epi: x / Non Sq Epi: Negative /HPF / Bacteria: Few /HPF        LIVER FUNCTIONS - ( 2022 04:43 )  Alb: 1.8 g/dL / Pro: 5.2 g/dL / ALK PHOS: 57 U/L / ALT: 17 U/L DA / AST: 23 U/L / GGT: x                 LACTATE:    ABG -     CULTURES:   Catheterized Catheterized   @ 18:59   No growth  --  --      .Blood Blood   @ 18:54   Growth in anaerobic bottle: Gram Negative Rods      .Blood Blood   @ 18:45   No growth to date.  --  --      Clean Catch Clean Catch (Midstream)   @ 18:41   No growth  --  --          RADIOLOGY:< from: Xray Chest 1 View- PORTABLE-Urgent (Xray Chest 1 View- PORTABLE-Urgent .) (22 @ 01:05) >  ACC: [id number] EXAM:  XR CHEST PORTABLE URGENT 1V                          PROCEDURE DATE:  2022          INTERPRETATION:  CLINICAL INDICATION: 46 years  Female with central line   placement.    COMPARISON: 2022    The right jugular catheter tip overlies the proximal right atrium.    AP view of the chest demonstrates mild right midlung discoid atelectasis.   Left lung is clear.. There is no pleural effusion. The pulmonary   vasculature is normal. There is no pneumothorax.    The heartis normal in size. There is no mediastinal or hilar mass.    The osseous structures are unremarkable.    IMPRESSION:    Right central line in satisfactory position. No pneumothorax.    Mild right midlung discoid atelectasis.    --- End of Report ---            DA SANTORO MD; Attending Radiologist    --------------------------------------------------------------------------------------------------------------------------------------------------------------------------------------------------------------------------------------------  ACC: [id number] EXAM:  CT ABDOMEN AND PELVIS [facility]                          PROCEDURE DATE:  2022          INTERPRETATION:  CLINICAL INFORMATION: Postop day 10 hysterectomy. Pelvic   abscess. Fever.    COMPARISON: Contrast-enhanced CT of the abdomen and pelvis 2022.    CONTRAST/COMPLICATIONS:  IV Contrast: Omnipaque 350  90 cc administered   10 cc discarded  Oral Contrast: NONE  Complications: None reported at time of study completion    PROCEDURE:  CT of the Abdomen and Pelvis was performed.  Sagittal and coronal reformats were performed.    FINDINGS:  LOWER CHEST: There are mild bilateral layering pleural effusions with   moderate bilateral lower lobe dependent compressive atelectasis.    LIVER: Scattered hepatic cysts. Otherwise, within normal limits.  BILE DUCTS: Normal caliber.  GALLBLADDER: Within normal limits.  SPLEEN: Within normal limits.  PANCREAS: Within normal limits.  ADRENALS: Within normal limits.  KIDNEYS/URETERS: There is persistent enhancement of the bilateral renal   cortices and excreted contrast in the renal collecting systems and   ureters excretion of the gallbladder. Otherwise, the kidneys are   symmetric in appearance, enhancement, and excretion.    The renal collecting systems and ureters aresymmetric in opacification   and course.    BLADDER: There is a Kendall catheter extending through an 8 mm defect in   the posterior midline wall on image 1:30, with the balloon inflated   within the peritoneal cavity of the pelvis. There is extravasation of   excreted contrast throughout the peritoneal cavity.  REPRODUCTIVE ORGANS: The uterus and ovaries have been removed.    BOWEL: No bowel obstruction.  PERITONEUM: No ascites.  VESSELS: Within normal limits.  RETROPERITONEUM/LYMPH NODES: No lymphadenopathy. Shotty para-aortic and   paracaval lymph nodes.  ABDOMINAL WALL: Within normal limits.  BONES: Within normal limits.    IMPRESSION: 8 mm perforation of the midline posterior bladder wall   through which extends a Kendall catheter. Free extravasation of excreted   contrast into the peritoneal cavity. Results discussed with YAMILEX Hoffman at   time of interpretation.    --- End of Report ---            CARMENZA POWELL MD; Attending Radiologist  This document has been electronically signed. 2022  3:42PM    < end of copied text >  < end of copied text >        ROS  [  ] UNABLE TO ELICIT

## 2022-06-21 LAB
ALBUMIN SERPL ELPH-MCNC: 1.8 G/DL — LOW (ref 3.5–5)
ALP SERPL-CCNC: 62 U/L — SIGNIFICANT CHANGE UP (ref 40–120)
ALT FLD-CCNC: 20 U/L DA — SIGNIFICANT CHANGE UP (ref 10–60)
ANION GAP SERPL CALC-SCNC: 6 MMOL/L — SIGNIFICANT CHANGE UP (ref 5–17)
ANION GAP SERPL CALC-SCNC: 9 MMOL/L — SIGNIFICANT CHANGE UP (ref 5–17)
ANISOCYTOSIS BLD QL: SLIGHT — SIGNIFICANT CHANGE UP
AST SERPL-CCNC: 37 U/L — SIGNIFICANT CHANGE UP (ref 10–40)
BASOPHILS # BLD AUTO: 0 K/UL — SIGNIFICANT CHANGE UP (ref 0–0.2)
BASOPHILS NFR BLD AUTO: 0 % — SIGNIFICANT CHANGE UP (ref 0–2)
BILIRUB SERPL-MCNC: 0.6 MG/DL — SIGNIFICANT CHANGE UP (ref 0.2–1.2)
BUN SERPL-MCNC: 10 MG/DL — SIGNIFICANT CHANGE UP (ref 7–18)
BUN SERPL-MCNC: 12 MG/DL — SIGNIFICANT CHANGE UP (ref 7–18)
CALCIUM SERPL-MCNC: 7.9 MG/DL — LOW (ref 8.4–10.5)
CALCIUM SERPL-MCNC: 7.9 MG/DL — LOW (ref 8.4–10.5)
CHLORIDE SERPL-SCNC: 104 MMOL/L — SIGNIFICANT CHANGE UP (ref 96–108)
CHLORIDE SERPL-SCNC: 106 MMOL/L — SIGNIFICANT CHANGE UP (ref 96–108)
CO2 SERPL-SCNC: 24 MMOL/L — SIGNIFICANT CHANGE UP (ref 22–31)
CO2 SERPL-SCNC: 24 MMOL/L — SIGNIFICANT CHANGE UP (ref 22–31)
CREAT SERPL-MCNC: 0.39 MG/DL — LOW (ref 0.5–1.3)
CREAT SERPL-MCNC: 0.43 MG/DL — LOW (ref 0.5–1.3)
EGFR: 121 ML/MIN/1.73M2 — SIGNIFICANT CHANGE UP
EGFR: 124 ML/MIN/1.73M2 — SIGNIFICANT CHANGE UP
EOSINOPHIL # BLD AUTO: 0 K/UL — SIGNIFICANT CHANGE UP (ref 0–0.5)
EOSINOPHIL NFR BLD AUTO: 0 % — SIGNIFICANT CHANGE UP (ref 0–6)
GLUCOSE SERPL-MCNC: 85 MG/DL — SIGNIFICANT CHANGE UP (ref 70–99)
GLUCOSE SERPL-MCNC: 99 MG/DL — SIGNIFICANT CHANGE UP (ref 70–99)
HCT VFR BLD CALC: 22.9 % — LOW (ref 34.5–45)
HCT VFR BLD CALC: 24.3 % — LOW (ref 34.5–45)
HGB BLD-MCNC: 7.9 G/DL — LOW (ref 11.5–15.5)
HGB BLD-MCNC: 8.5 G/DL — LOW (ref 11.5–15.5)
HYPOCHROMIA BLD QL: SLIGHT — SIGNIFICANT CHANGE UP
LYMPHOCYTES # BLD AUTO: 11 % — LOW (ref 13–44)
LYMPHOCYTES # BLD AUTO: 2.18 K/UL — SIGNIFICANT CHANGE UP (ref 1–3.3)
MACROCYTES BLD QL: SLIGHT — SIGNIFICANT CHANGE UP
MAGNESIUM SERPL-MCNC: 2.3 MG/DL — SIGNIFICANT CHANGE UP (ref 1.6–2.6)
MANUAL SMEAR VERIFICATION: SIGNIFICANT CHANGE UP
MCHC RBC-ENTMCNC: 28.6 PG — SIGNIFICANT CHANGE UP (ref 27–34)
MCHC RBC-ENTMCNC: 29.2 PG — SIGNIFICANT CHANGE UP (ref 27–34)
MCHC RBC-ENTMCNC: 34.5 GM/DL — SIGNIFICANT CHANGE UP (ref 32–36)
MCHC RBC-ENTMCNC: 35 GM/DL — SIGNIFICANT CHANGE UP (ref 32–36)
MCV RBC AUTO: 83 FL — SIGNIFICANT CHANGE UP (ref 80–100)
MCV RBC AUTO: 83.5 FL — SIGNIFICANT CHANGE UP (ref 80–100)
MICROCYTES BLD QL: SIGNIFICANT CHANGE UP
MONOCYTES # BLD AUTO: 0.4 K/UL — SIGNIFICANT CHANGE UP (ref 0–0.9)
MONOCYTES NFR BLD AUTO: 2 % — SIGNIFICANT CHANGE UP (ref 2–14)
MRSA PCR RESULT.: SIGNIFICANT CHANGE UP
MRSA PCR RESULT.: SIGNIFICANT CHANGE UP
NEUTROPHILS # BLD AUTO: 17.22 K/UL — HIGH (ref 1.8–7.4)
NEUTROPHILS NFR BLD AUTO: 85 % — HIGH (ref 43–77)
NEUTS BAND # BLD: 2 % — SIGNIFICANT CHANGE UP (ref 0–8)
NRBC # BLD: 0 /100 WBCS — SIGNIFICANT CHANGE UP (ref 0–0)
NRBC # BLD: 0 /100 — SIGNIFICANT CHANGE UP (ref 0–0)
PHOSPHATE SERPL-MCNC: 1.8 MG/DL — LOW (ref 2.5–4.5)
PHOSPHATE SERPL-MCNC: 2.4 MG/DL — LOW (ref 2.5–4.5)
PLAT MORPH BLD: NORMAL — SIGNIFICANT CHANGE UP
PLATELET # BLD AUTO: 254 K/UL — SIGNIFICANT CHANGE UP (ref 150–400)
PLATELET # BLD AUTO: 327 K/UL — SIGNIFICANT CHANGE UP (ref 150–400)
PLATELET COUNT - ESTIMATE: NORMAL — SIGNIFICANT CHANGE UP
POIKILOCYTOSIS BLD QL AUTO: SLIGHT — SIGNIFICANT CHANGE UP
POLYCHROMASIA BLD QL SMEAR: SLIGHT — SIGNIFICANT CHANGE UP
POTASSIUM SERPL-MCNC: 3.7 MMOL/L — SIGNIFICANT CHANGE UP (ref 3.5–5.3)
POTASSIUM SERPL-MCNC: 3.8 MMOL/L — SIGNIFICANT CHANGE UP (ref 3.5–5.3)
POTASSIUM SERPL-SCNC: 3.7 MMOL/L — SIGNIFICANT CHANGE UP (ref 3.5–5.3)
POTASSIUM SERPL-SCNC: 3.8 MMOL/L — SIGNIFICANT CHANGE UP (ref 3.5–5.3)
PROT SERPL-MCNC: 5.3 G/DL — LOW (ref 6–8.3)
RBC # BLD: 2.76 M/UL — LOW (ref 3.8–5.2)
RBC # BLD: 2.91 M/UL — LOW (ref 3.8–5.2)
RBC # FLD: 15.9 % — HIGH (ref 10.3–14.5)
RBC # FLD: 16.1 % — HIGH (ref 10.3–14.5)
RBC BLD AUTO: ABNORMAL
S AUREUS DNA NOSE QL NAA+PROBE: DETECTED
S AUREUS DNA NOSE QL NAA+PROBE: DETECTED
SODIUM SERPL-SCNC: 136 MMOL/L — SIGNIFICANT CHANGE UP (ref 135–145)
SODIUM SERPL-SCNC: 137 MMOL/L — SIGNIFICANT CHANGE UP (ref 135–145)
WBC # BLD: 16.71 K/UL — HIGH (ref 3.8–10.5)
WBC # BLD: 19.79 K/UL — HIGH (ref 3.8–10.5)
WBC # FLD AUTO: 16.71 K/UL — HIGH (ref 3.8–10.5)
WBC # FLD AUTO: 19.79 K/UL — HIGH (ref 3.8–10.5)

## 2022-06-21 PROCEDURE — 71045 X-RAY EXAM CHEST 1 VIEW: CPT | Mod: 26

## 2022-06-21 RX ORDER — POTASSIUM PHOSPHATE, MONOBASIC POTASSIUM PHOSPHATE, DIBASIC 236; 224 MG/ML; MG/ML
15 INJECTION, SOLUTION INTRAVENOUS ONCE
Refills: 0 | Status: COMPLETED | OUTPATIENT
Start: 2022-06-21 | End: 2022-06-21

## 2022-06-21 RX ORDER — POTASSIUM PHOSPHATE, MONOBASIC POTASSIUM PHOSPHATE, DIBASIC 236; 224 MG/ML; MG/ML
30 INJECTION, SOLUTION INTRAVENOUS ONCE
Refills: 0 | Status: COMPLETED | OUTPATIENT
Start: 2022-06-21 | End: 2022-06-21

## 2022-06-21 RX ORDER — ALBUMIN HUMAN 25 %
50 VIAL (ML) INTRAVENOUS ONCE
Refills: 0 | Status: COMPLETED | OUTPATIENT
Start: 2022-06-21 | End: 2022-06-21

## 2022-06-21 RX ORDER — SODIUM,POTASSIUM PHOSPHATES 278-250MG
2 POWDER IN PACKET (EA) ORAL THREE TIMES A DAY
Refills: 0 | Status: COMPLETED | OUTPATIENT
Start: 2022-06-21 | End: 2022-06-22

## 2022-06-21 RX ORDER — MUPIROCIN 20 MG/G
1 OINTMENT TOPICAL
Refills: 0 | Status: DISCONTINUED | OUTPATIENT
Start: 2022-06-21 | End: 2022-06-25

## 2022-06-21 RX ORDER — FUROSEMIDE 40 MG
20 TABLET ORAL ONCE
Refills: 0 | Status: COMPLETED | OUTPATIENT
Start: 2022-06-21 | End: 2022-06-21

## 2022-06-21 RX ORDER — ENOXAPARIN SODIUM 100 MG/ML
40 INJECTION SUBCUTANEOUS EVERY 24 HOURS
Refills: 0 | Status: DISCONTINUED | OUTPATIENT
Start: 2022-06-21 | End: 2022-06-25

## 2022-06-21 RX ADMIN — Medication 1000 MILLIGRAM(S): at 00:18

## 2022-06-21 RX ADMIN — Medication 5 MILLIGRAM(S): at 06:34

## 2022-06-21 RX ADMIN — ENOXAPARIN SODIUM 40 MILLIGRAM(S): 100 INJECTION SUBCUTANEOUS at 10:49

## 2022-06-21 RX ADMIN — PANTOPRAZOLE SODIUM 40 MILLIGRAM(S): 20 TABLET, DELAYED RELEASE ORAL at 11:56

## 2022-06-21 RX ADMIN — MUPIROCIN 1 APPLICATION(S): 20 OINTMENT TOPICAL at 20:04

## 2022-06-21 RX ADMIN — Medication 100 MILLIGRAM(S): at 13:45

## 2022-06-21 RX ADMIN — Medication 1000 MILLIGRAM(S): at 14:00

## 2022-06-21 RX ADMIN — POTASSIUM PHOSPHATE, MONOBASIC POTASSIUM PHOSPHATE, DIBASIC 83.33 MILLIMOLE(S): 236; 224 INJECTION, SOLUTION INTRAVENOUS at 06:31

## 2022-06-21 RX ADMIN — Medication 1000 MILLIGRAM(S): at 13:45

## 2022-06-21 RX ADMIN — Medication 1000 MILLIGRAM(S): at 23:35

## 2022-06-21 RX ADMIN — Medication 2 PACKET(S): at 13:45

## 2022-06-21 RX ADMIN — Medication 1000 MILLIGRAM(S): at 06:43

## 2022-06-21 RX ADMIN — ONDANSETRON 4 MILLIGRAM(S): 8 TABLET, FILM COATED ORAL at 16:45

## 2022-06-21 RX ADMIN — Medication 1000 MILLIGRAM(S): at 22:35

## 2022-06-21 RX ADMIN — CEFTRIAXONE 100 MILLIGRAM(S): 500 INJECTION, POWDER, FOR SOLUTION INTRAMUSCULAR; INTRAVENOUS at 14:51

## 2022-06-21 RX ADMIN — Medication 20 MILLIGRAM(S): at 10:49

## 2022-06-21 RX ADMIN — Medication 1000 MILLIGRAM(S): at 05:15

## 2022-06-21 RX ADMIN — Medication 100 MILLIGRAM(S): at 21:56

## 2022-06-21 RX ADMIN — Medication 2 PACKET(S): at 22:36

## 2022-06-21 RX ADMIN — MORPHINE SULFATE 3 MILLIGRAM(S): 50 CAPSULE, EXTENDED RELEASE ORAL at 00:18

## 2022-06-21 RX ADMIN — Medication 100 MILLIGRAM(S): at 05:17

## 2022-06-21 RX ADMIN — CHLORHEXIDINE GLUCONATE 1 APPLICATION(S): 213 SOLUTION TOPICAL at 05:17

## 2022-06-21 RX ADMIN — Medication 50 MILLILITER(S): at 09:40

## 2022-06-21 NOTE — CHART NOTE - NSCHARTNOTEFT_GEN_A_CORE
RIJ central catheter was removed under aseptic conditions. Appropriate pressure applied afterwards for at least 5 minutes to minimize blood loss. wound was covered with sterile gauze.

## 2022-06-21 NOTE — PROGRESS NOTE ADULT - SUBJECTIVE AND OBJECTIVE BOX
INTERVAL HPI/OVERNIGHT EVENTS: ***    PRESSORS: [ ] YES [ ] NO  WHICH:    ANTIBIOTICS:                      Antimicrobial:  cefTRIAXone   IVPB      cefTRIAXone   IVPB 1000 milliGRAM(s) IV Intermittent every 24 hours  metroNIDAZOLE  IVPB      metroNIDAZOLE  IVPB 500 milliGRAM(s) IV Intermittent every 8 hours    Cardiovascular:    Pulmonary:    Hematalogic:    Other:  acetaminophen     Tablet .. 1000 milliGRAM(s) Oral every 8 hours  aluminum hydroxide/magnesium hydroxide/simethicone Suspension 30 milliLiter(s) Oral every 6 hours PRN  bisacodyl 5 milliGRAM(s) Oral daily PRN  chlorhexidine 2% Cloths 1 Application(s) Topical <User Schedule>  influenza   Vaccine 0.5 milliLiter(s) IntraMuscular once  morphine  - Injectable 3 milliGRAM(s) IV Push every 3 hours PRN  naloxone Injectable 0.4 milliGRAM(s) IV Push once  ondansetron Injectable 4 milliGRAM(s) IV Push every 6 hours PRN  oxyCODONE    IR 2.5 milliGRAM(s) Oral every 4 hours PRN  oxyCODONE    IR 5 milliGRAM(s) Oral every 4 hours PRN  pantoprazole  Injectable 40 milliGRAM(s) IV Push daily  polyethylene glycol 3350 17 Gram(s) Oral daily  potassium phosphate / sodium phosphate Powder (PHOS-NaK) 2 Packet(s) Oral three times a day  potassium phosphate IVPB 15 milliMole(s) IV Intermittent once  senna 2 Tablet(s) Oral at bedtime  sodium chloride 0.9% lock flush 10 milliLiter(s) IV Push every 1 hour PRN    acetaminophen     Tablet .. 1000 milliGRAM(s) Oral every 8 hours  aluminum hydroxide/magnesium hydroxide/simethicone Suspension 30 milliLiter(s) Oral every 6 hours PRN  bisacodyl 5 milliGRAM(s) Oral daily PRN  cefTRIAXone   IVPB      cefTRIAXone   IVPB 1000 milliGRAM(s) IV Intermittent every 24 hours  chlorhexidine 2% Cloths 1 Application(s) Topical <User Schedule>  influenza   Vaccine 0.5 milliLiter(s) IntraMuscular once  metroNIDAZOLE  IVPB      metroNIDAZOLE  IVPB 500 milliGRAM(s) IV Intermittent every 8 hours  morphine  - Injectable 3 milliGRAM(s) IV Push every 3 hours PRN  naloxone Injectable 0.4 milliGRAM(s) IV Push once  ondansetron Injectable 4 milliGRAM(s) IV Push every 6 hours PRN  oxyCODONE    IR 2.5 milliGRAM(s) Oral every 4 hours PRN  oxyCODONE    IR 5 milliGRAM(s) Oral every 4 hours PRN  pantoprazole  Injectable 40 milliGRAM(s) IV Push daily  polyethylene glycol 3350 17 Gram(s) Oral daily  potassium phosphate / sodium phosphate Powder (PHOS-NaK) 2 Packet(s) Oral three times a day  potassium phosphate IVPB 15 milliMole(s) IV Intermittent once  senna 2 Tablet(s) Oral at bedtime  sodium chloride 0.9% lock flush 10 milliLiter(s) IV Push every 1 hour PRN    Drug Dosing Weight  Height (cm): 157.5 (17 Jun 2022 09:54)  Weight (kg): 53.1 (17 Jun 2022 09:54)  BMI (kg/m2): 21.4 (17 Jun 2022 09:54)  BSA (m2): 1.52 (17 Jun 2022 09:54)    CENTRAL LINE: [ ] YES [ ] NO  LOCATION:   DATE INSERTED:  REMOVE: [ ] YES [ ] NO  EXPLAIN:    DIAZ: [ ] YES [ ] NO    DATE INSERTED:  REMOVE:  [ ] YES [ ] NO  EXPLAIN:    A-LINE:  [ ] YES [ ] NO  LOCATION:   DATE INSERTED:  REMOVE:  [ ] YES [ ] NO  EXPLAIN:    PMH -reviewed admission note, no change since admission    ICU Vital Signs Last 24 Hrs  T(C): 37.3 (21 Jun 2022 04:00), Max: 38 (21 Jun 2022 00:00)  T(F): 99.1 (21 Jun 2022 04:00), Max: 100.4 (21 Jun 2022 00:00)  HR: 128 (21 Jun 2022 07:00) (93 - 128)  BP: 115/77 (21 Jun 2022 07:00) (104/68 - 134/77)  BP(mean): 83 (21 Jun 2022 07:00) (73 - 103)  ABP: --  ABP(mean): --  RR: 32 (21 Jun 2022 07:00) (16 - 34)  SpO2: 94% (21 Jun 2022 07:00) (92% - 98%)            06-20 @ 07:01  -  06-21 @ 07:00  --------------------------------------------------------  IN: 1425 mL / OUT: 2540 mL / NET: -1115 mL            PHYSICAL EXAM:    GENERAL: NAD, well-groomed, well-developed  HEAD:  Atraumatic, Normocephalic  EYES: EOMI, PERRLA, conjunctiva and sclera clear  ENMT: No tonsillar erythema, exudates, or enlargement; Moist mucous membranes, Good dentition, No lesions  NECK: Supple, normal appearance, No JVD; Normal thyroid; Trachea midline  NERVOUS SYSTEM:  Alert & Oriented X3, Good concentration; Motor Strength 5/5 B/L upper and lower extremities; DTRs 2+ intact and symmetric  CHEST/LUNG: No chest deformity; Normal percussion bilaterally; No rales, rhonchi, wheezing   HEART: Regular rate and rhythm; No murmurs, rubs, or gallops  ABDOMEN: Soft, Nontender, Nondistended; Bowel sounds present  EXTREMITIES:  2+ Peripheral Pulses, No clubbing, cyanosis, or edema  LYMPH: No lymphadenopathy noted  SKIN: No rashes or lesions; Good capillary refill      LABS:  CBC Full  -  ( 21 Jun 2022 04:29 )  WBC Count : 19.79 K/uL  RBC Count : 2.76 M/uL  Hemoglobin : 7.9 g/dL  Hematocrit : 22.9 %  Platelet Count - Automated : 254 K/uL  Mean Cell Volume : 83.0 fl  Mean Cell Hemoglobin : 28.6 pg  Mean Cell Hemoglobin Concentration : 34.5 gm/dL  Auto Neutrophil # : 17.22 K/uL  Auto Lymphocyte # : 2.18 K/uL  Auto Monocyte # : 0.40 K/uL  Auto Eosinophil # : 0.00 K/uL  Auto Basophil # : 0.00 K/uL  Auto Neutrophil % : 85.0 %  Auto Lymphocyte % : 11.0 %  Auto Monocyte % : 2.0 %  Auto Eosinophil % : 0.0 %  Auto Basophil % : 0.0 %    06-21    136  |  106  |  12  ----------------------------<  85  3.8   |  24  |  0.39<L>    Ca    7.9<L>      21 Jun 2022 04:29  Phos  1.8     06-21  Mg     2.3     06-21    TPro  5.3<L>  /  Alb  1.8<L>  /  TBili  0.6  /  DBili  x   /  AST  37  /  ALT  20  /  AlkPhos  62  06-21        Culture Results:   No growth (06-18 @ 18:59)      RADIOLOGY & ADDITIONAL STUDIES REVIEWED:  ***    GOALS OF CARE DISCUSSION WITH PATIENT/FAMILY/PROXY:    CRITICAL CARE TIME SPENT: 35 minutes INTERVAL HPI/OVERNIGHT EVENTS:   Pt given albumin and lasix overnight with improvement in O2 requirement from 6L to 2L.    PRESSORS: [ ] YES [ ] NO  WHICH:    ANTIBIOTICS:                      Antimicrobial:  cefTRIAXone   IVPB      cefTRIAXone   IVPB 1000 milliGRAM(s) IV Intermittent every 24 hours  metroNIDAZOLE  IVPB      metroNIDAZOLE  IVPB 500 milliGRAM(s) IV Intermittent every 8 hours    Cardiovascular:    Pulmonary:    Hematalogic:    Other:  acetaminophen     Tablet .. 1000 milliGRAM(s) Oral every 8 hours  aluminum hydroxide/magnesium hydroxide/simethicone Suspension 30 milliLiter(s) Oral every 6 hours PRN  bisacodyl 5 milliGRAM(s) Oral daily PRN  chlorhexidine 2% Cloths 1 Application(s) Topical <User Schedule>  influenza   Vaccine 0.5 milliLiter(s) IntraMuscular once  morphine  - Injectable 3 milliGRAM(s) IV Push every 3 hours PRN  naloxone Injectable 0.4 milliGRAM(s) IV Push once  ondansetron Injectable 4 milliGRAM(s) IV Push every 6 hours PRN  oxyCODONE    IR 2.5 milliGRAM(s) Oral every 4 hours PRN  oxyCODONE    IR 5 milliGRAM(s) Oral every 4 hours PRN  pantoprazole  Injectable 40 milliGRAM(s) IV Push daily  polyethylene glycol 3350 17 Gram(s) Oral daily  potassium phosphate / sodium phosphate Powder (PHOS-NaK) 2 Packet(s) Oral three times a day  potassium phosphate IVPB 15 milliMole(s) IV Intermittent once  senna 2 Tablet(s) Oral at bedtime  sodium chloride 0.9% lock flush 10 milliLiter(s) IV Push every 1 hour PRN    acetaminophen     Tablet .. 1000 milliGRAM(s) Oral every 8 hours  aluminum hydroxide/magnesium hydroxide/simethicone Suspension 30 milliLiter(s) Oral every 6 hours PRN  bisacodyl 5 milliGRAM(s) Oral daily PRN  cefTRIAXone   IVPB      cefTRIAXone   IVPB 1000 milliGRAM(s) IV Intermittent every 24 hours  chlorhexidine 2% Cloths 1 Application(s) Topical <User Schedule>  influenza   Vaccine 0.5 milliLiter(s) IntraMuscular once  metroNIDAZOLE  IVPB      metroNIDAZOLE  IVPB 500 milliGRAM(s) IV Intermittent every 8 hours  morphine  - Injectable 3 milliGRAM(s) IV Push every 3 hours PRN  naloxone Injectable 0.4 milliGRAM(s) IV Push once  ondansetron Injectable 4 milliGRAM(s) IV Push every 6 hours PRN  oxyCODONE    IR 2.5 milliGRAM(s) Oral every 4 hours PRN  oxyCODONE    IR 5 milliGRAM(s) Oral every 4 hours PRN  pantoprazole  Injectable 40 milliGRAM(s) IV Push daily  polyethylene glycol 3350 17 Gram(s) Oral daily  potassium phosphate / sodium phosphate Powder (PHOS-NaK) 2 Packet(s) Oral three times a day  potassium phosphate IVPB 15 milliMole(s) IV Intermittent once  senna 2 Tablet(s) Oral at bedtime  sodium chloride 0.9% lock flush 10 milliLiter(s) IV Push every 1 hour PRN    Drug Dosing Weight  Height (cm): 157.5 (17 Jun 2022 09:54)  Weight (kg): 53.1 (17 Jun 2022 09:54)  BMI (kg/m2): 21.4 (17 Jun 2022 09:54)  BSA (m2): 1.52 (17 Jun 2022 09:54)    CENTRAL LINE: [ ] YES [ ] NO  LOCATION:   DATE INSERTED:  REMOVE: [ ] YES [ ] NO  EXPLAIN:    DIAZ: [ ] YES [ ] NO    DATE INSERTED:  REMOVE:  [ ] YES [ ] NO  EXPLAIN:    A-LINE:  [ ] YES [ ] NO  LOCATION:   DATE INSERTED:  REMOVE:  [ ] YES [ ] NO  EXPLAIN:    PMH -reviewed admission note, no change since admission    ICU Vital Signs Last 24 Hrs  T(C): 37.3 (21 Jun 2022 04:00), Max: 38 (21 Jun 2022 00:00)  T(F): 99.1 (21 Jun 2022 04:00), Max: 100.4 (21 Jun 2022 00:00)  HR: 128 (21 Jun 2022 07:00) (93 - 128)  BP: 115/77 (21 Jun 2022 07:00) (104/68 - 134/77)  BP(mean): 83 (21 Jun 2022 07:00) (73 - 103)  ABP: --  ABP(mean): --  RR: 32 (21 Jun 2022 07:00) (16 - 34)  SpO2: 94% (21 Jun 2022 07:00) (92% - 98%)            06-20 @ 07:01  -  06-21 @ 07:00  --------------------------------------------------------  IN: 1425 mL / OUT: 2540 mL / NET: -1115 mL            PHYSICAL EXAM:    GENERAL: NAD, well-groomed, well-developed  HEAD:  Atraumatic, Normocephalic  EYES: EOMI, PERRLA, conjunctiva and sclera clear  ENMT: No tonsillar erythema, exudates, or enlargement; Moist mucous membranes, Good dentition, No lesions  NECK: Supple, normal appearance, No JVD; Normal thyroid; Trachea midline  NERVOUS SYSTEM:  Alert & Oriented X3, Good concentration; Motor Strength 5/5 B/L upper and lower extremities; DTRs 2+ intact and symmetric  CHEST/LUNG: No chest deformity; (+) decreased breath sounds b/l  HEART: Regular rate and rhythm; No murmurs, rubs, or gallops  ABDOMEN: Soft, Nontender, Nondistended; Bowel sounds present  EXTREMITIES:  2+ Peripheral Pulses, No clubbing, cyanosis, or edema  LYMPH: No lymphadenopathy noted  SKIN: No rashes or lesions; Good capillary refill      LABS:  CBC Full  -  ( 21 Jun 2022 04:29 )  WBC Count : 19.79 K/uL  RBC Count : 2.76 M/uL  Hemoglobin : 7.9 g/dL  Hematocrit : 22.9 %  Platelet Count - Automated : 254 K/uL  Mean Cell Volume : 83.0 fl  Mean Cell Hemoglobin : 28.6 pg  Mean Cell Hemoglobin Concentration : 34.5 gm/dL  Auto Neutrophil # : 17.22 K/uL  Auto Lymphocyte # : 2.18 K/uL  Auto Monocyte # : 0.40 K/uL  Auto Eosinophil # : 0.00 K/uL  Auto Basophil # : 0.00 K/uL  Auto Neutrophil % : 85.0 %  Auto Lymphocyte % : 11.0 %  Auto Monocyte % : 2.0 %  Auto Eosinophil % : 0.0 %  Auto Basophil % : 0.0 %    06-21    136  |  106  |  12  ----------------------------<  85  3.8   |  24  |  0.39<L>    Ca    7.9<L>      21 Jun 2022 04:29  Phos  1.8     06-21  Mg     2.3     06-21    TPro  5.3<L>  /  Alb  1.8<L>  /  TBili  0.6  /  DBili  x   /  AST  37  /  ALT  20  /  AlkPhos  62  06-21        Culture Results:   No growth (06-18 @ 18:59)      RADIOLOGY & ADDITIONAL STUDIES REVIEWED:  ***    GOALS OF CARE DISCUSSION WITH PATIENT/FAMILY/PROXY:    CRITICAL CARE TIME SPENT: 35 minutes INTERVAL HPI/OVERNIGHT EVENTS:   Pt given albumin and lasix overnight with improvement in O2 requirement from 6L to 2L.    PRESSORS: [ ] YES [ ] NO  WHICH:    ANTIBIOTICS:                      Antimicrobial:  cefTRIAXone   IVPB      cefTRIAXone   IVPB 1000 milliGRAM(s) IV Intermittent every 24 hours  metroNIDAZOLE  IVPB      metroNIDAZOLE  IVPB 500 milliGRAM(s) IV Intermittent every 8 hours    Cardiovascular:    Pulmonary:    Hematalogic:    Other:  acetaminophen     Tablet .. 1000 milliGRAM(s) Oral every 8 hours  aluminum hydroxide/magnesium hydroxide/simethicone Suspension 30 milliLiter(s) Oral every 6 hours PRN  bisacodyl 5 milliGRAM(s) Oral daily PRN  chlorhexidine 2% Cloths 1 Application(s) Topical <User Schedule>  influenza   Vaccine 0.5 milliLiter(s) IntraMuscular once  morphine  - Injectable 3 milliGRAM(s) IV Push every 3 hours PRN  naloxone Injectable 0.4 milliGRAM(s) IV Push once  ondansetron Injectable 4 milliGRAM(s) IV Push every 6 hours PRN  oxyCODONE    IR 2.5 milliGRAM(s) Oral every 4 hours PRN  oxyCODONE    IR 5 milliGRAM(s) Oral every 4 hours PRN  pantoprazole  Injectable 40 milliGRAM(s) IV Push daily  polyethylene glycol 3350 17 Gram(s) Oral daily  potassium phosphate / sodium phosphate Powder (PHOS-NaK) 2 Packet(s) Oral three times a day  potassium phosphate IVPB 15 milliMole(s) IV Intermittent once  senna 2 Tablet(s) Oral at bedtime  sodium chloride 0.9% lock flush 10 milliLiter(s) IV Push every 1 hour PRN    acetaminophen     Tablet .. 1000 milliGRAM(s) Oral every 8 hours  aluminum hydroxide/magnesium hydroxide/simethicone Suspension 30 milliLiter(s) Oral every 6 hours PRN  bisacodyl 5 milliGRAM(s) Oral daily PRN  cefTRIAXone   IVPB      cefTRIAXone   IVPB 1000 milliGRAM(s) IV Intermittent every 24 hours  chlorhexidine 2% Cloths 1 Application(s) Topical <User Schedule>  influenza   Vaccine 0.5 milliLiter(s) IntraMuscular once  metroNIDAZOLE  IVPB      metroNIDAZOLE  IVPB 500 milliGRAM(s) IV Intermittent every 8 hours  morphine  - Injectable 3 milliGRAM(s) IV Push every 3 hours PRN  naloxone Injectable 0.4 milliGRAM(s) IV Push once  ondansetron Injectable 4 milliGRAM(s) IV Push every 6 hours PRN  oxyCODONE    IR 2.5 milliGRAM(s) Oral every 4 hours PRN  oxyCODONE    IR 5 milliGRAM(s) Oral every 4 hours PRN  pantoprazole  Injectable 40 milliGRAM(s) IV Push daily  polyethylene glycol 3350 17 Gram(s) Oral daily  potassium phosphate / sodium phosphate Powder (PHOS-NaK) 2 Packet(s) Oral three times a day  potassium phosphate IVPB 15 milliMole(s) IV Intermittent once  senna 2 Tablet(s) Oral at bedtime  sodium chloride 0.9% lock flush 10 milliLiter(s) IV Push every 1 hour PRN    Drug Dosing Weight  Height (cm): 157.5 (17 Jun 2022 09:54)  Weight (kg): 53.1 (17 Jun 2022 09:54)  BMI (kg/m2): 21.4 (17 Jun 2022 09:54)  BSA (m2): 1.52 (17 Jun 2022 09:54)    CENTRAL LINE: [ ] YES [ ] NO  LOCATION:   DATE INSERTED:  REMOVE: [ ] YES [ ] NO  EXPLAIN:    DIAZ: [ ] YES [ ] NO    DATE INSERTED:  REMOVE:  [ ] YES [ ] NO  EXPLAIN:    A-LINE:  [ ] YES [ ] NO  LOCATION:   DATE INSERTED:  REMOVE:  [ ] YES [ ] NO  EXPLAIN:    PMH -reviewed admission note, no change since admission    ICU Vital Signs Last 24 Hrs  T(C): 37.3 (21 Jun 2022 04:00), Max: 38 (21 Jun 2022 00:00)  T(F): 99.1 (21 Jun 2022 04:00), Max: 100.4 (21 Jun 2022 00:00)  HR: 128 (21 Jun 2022 07:00) (93 - 128)  BP: 115/77 (21 Jun 2022 07:00) (104/68 - 134/77)  BP(mean): 83 (21 Jun 2022 07:00) (73 - 103)  ABP: --  ABP(mean): --  RR: 32 (21 Jun 2022 07:00) (16 - 34)  SpO2: 94% (21 Jun 2022 07:00) (92% - 98%)            06-20 @ 07:01  -  06-21 @ 07:00  --------------------------------------------------------  IN: 1425 mL / OUT: 2540 mL / NET: -1115 mL            PHYSICAL EXAM:    GENERAL: NAD, well-groomed, well-developed  HEAD:  Atraumatic, Normocephalic  EYES: EOMI, PERRLA, conjunctiva and sclera clear  ENMT: No tonsillar erythema, exudates, or enlargement; Moist mucous membranes, Good dentition, No lesions  NECK: Supple, normal appearance, No JVD; Normal thyroid; Trachea midline  NERVOUS SYSTEM:  Alert & Oriented X3, Good concentration; Motor Strength 5/5 B/L upper and lower extremities; DTRs 2+ intact and symmetric  CHEST/LUNG: No chest deformity; (+) decreased breath sounds b/l  HEART: Regular rate and rhythm; No murmurs, rubs, or gallops  ABDOMEN: Soft, Nontender, Nondistended; Bowel sounds present  EXTREMITIES:  2+ Peripheral Pulses, No clubbing, cyanosis, or edema  LYMPH: No lymphadenopathy noted  SKIN: No rashes or lesions; Good capillary refill      LABS:  CBC Full  -  ( 21 Jun 2022 04:29 )  WBC Count : 19.79 K/uL  RBC Count : 2.76 M/uL  Hemoglobin : 7.9 g/dL  Hematocrit : 22.9 %  Platelet Count - Automated : 254 K/uL  Mean Cell Volume : 83.0 fl  Mean Cell Hemoglobin : 28.6 pg  Mean Cell Hemoglobin Concentration : 34.5 gm/dL  Auto Neutrophil # : 17.22 K/uL  Auto Lymphocyte # : 2.18 K/uL  Auto Monocyte # : 0.40 K/uL  Auto Eosinophil # : 0.00 K/uL  Auto Basophil # : 0.00 K/uL  Auto Neutrophil % : 85.0 %  Auto Lymphocyte % : 11.0 %  Auto Monocyte % : 2.0 %  Auto Eosinophil % : 0.0 %  Auto Basophil % : 0.0 %    06-21    136  |  106  |  12  ----------------------------<  85  3.8   |  24  |  0.39<L>    Ca    7.9<L>      21 Jun 2022 04:29  Phos  1.8     06-21  Mg     2.3     06-21    TPro  5.3<L>  /  Alb  1.8<L>  /  TBili  0.6  /  DBili  x   /  AST  37  /  ALT  20  /  AlkPhos  62  06-21    CT abd/ pelvis:< from: CT Abdomen and Pelvis No Cont (06.20.22 @ 18:11) >    ACC: [id number] EXAM:  CT ABDOMEN AND PELVIS                        ACC: [id number] EXAM:  CT ANGIO ABD PELV (W)AW IC                          PROCEDURE DATE:  06/20/2022          INTERPRETATION:  CLINICAL INFORMATION: Status post exploratory laparotomy   with abdominal washout for septic shock, hemorrhagic shock,   retroperitoneal bleeding, GI bleeding, pelvic inflammatory disease,   intraperitoneal bladder rupture    COMPARISON: CT abdomen pelvis 6/18/2022    CONTRAST/COMPLICATIONS:  IV Contrast:Omnipaque 350  80 cc administered   0 cc discarded  Oral Contrast: NONE  Complications: None reported at time of study completion    PROCEDURE:  CT Angiography of the Abdomen and Pelvis.  Precontrast, Arterial and Delayed phases were acquired.  Sagittal and coronal reformats were performed as well as 3D (MIP)   reconstructions.    FINDINGS:  LOWER CHEST: Moderate bilateral effusions and bibasilar atelectasis.    LIVER: Scattered cysts.  BILE DUCTS: Nondilated.  GALLBLADDER: Normal.  SPLEEN: Normal.  PANCREAS: Normal.  ADRENALS: Normal.  KIDNEYS/URETERS: Symmetric nephrograms. No hydronephrosis. Punctate   nonobstructing right renal calculus.    BLADDER: Collapsed around a Diaz catheter balloon. Residual contrast in   the lumen.  REPRODUCTIVEORGANS: Not well seen. Surgical packing material in the   pelvis.    BOWEL: Residual positive oral contrast throughout the colon from a prior   to administration limiting evaluation for active colonic bleeding. No   active bleeding in the small bowel. No obstruction or inflammation.  PERITONEUM: No free air. Moderate ascites with layering high density   material in the pelvis, which could represent residual leaked contrast.   Pelvic drainage catheters in place.  VESSELS: Normal caliber aorta.  RETROPERITONEUM/LYMPH NODES: No adenopathy or hematoma.  ABDOMINAL WALL: Postsurgical changes with diffuse body wall edema. No   hematoma.  BONES: No acute bony abnormality.    IMPRESSION:  *  No evidence of retroperitoneal or subcutaneous hematoma  *  No evidence of active GI bleeding in the small bowel. Limited   evaluation for active GI bleeding in the large bowel secondary to the   presence of residual high density intraluminal contrast from a prior   administration.  *  Moderate ascites with layering high density material in the pelvis,   which could represent residual leaked contrast.      --- End of Report ---            GALDYS MARC MD; Attending Radiologist  This document has been electronically signed. Jun 20 2022  7:44PM    < end of copied text >          Culture Results:   No growth (06-18 @ 18:59)      RADIOLOGY & ADDITIONAL STUDIES REVIEWED:  ***    GOALS OF CARE DISCUSSION WITH PATIENT/FAMILY/PROXY:    CRITICAL CARE TIME SPENT: 35 minutes

## 2022-06-21 NOTE — PROGRESS NOTE ADULT - ASSESSMENT
ASSESSMENT & PLAN:  46-year-old woman with history of a recent hysterectomy (6/8/22) for a cyst, presented to  on 6/17 with abdominal pain and fevers, found to have septic shock related to B. Fragilis bacteremia from a bladder perforation with peritoneal extravasation, emergent OR repaid of perforation with abdominal washout (6/18), admitted to ICU postoperatively for hypotension. Course complicated by acute blood loss anemia post operative and worsening septic shock requiring vasopressor support.      ACTIVE ISSUES:  #Septic Shock secondary to B. Fragilis Bacteremia  #Hemorrhagic Shock  #Bladder Perforation status post OR Repair (6/18)  #Pelvic Fluid Collection/Phlegmon Formation status post Washout in OR (6/18)  #Acute Pre-Renal Nonoliguric Renal Failure  #?Ovarian Vein Thrombosis     PLAN:  Neurologic  #Pain Control  - Morphine IVP PRN for postoperative pain.   - If continued renal function can add Toradol IVP.     Pulmonary:  #No Acute Issues  - Received a large volume of IVF during resuscitation monitor closely for development of pulmonary edema.   - Lung POCUS currently with Laurel, gabe B lines.   - Continuous pulse ox monitoring.     Cardiovascular  #Septic Shock secondary to B. Fragilis Bacteremia  #Hemorrhagic Shock  - Severe hypotension postoperatively from sepsis and hypovolemia.   - Received large volume IVF and PRBC x2 with improvement.   - Off vasopressors.   - Lactate normalized   - TTE ordered and pending.     GI  #Nutrition  - Cleared for clear diet by surgery attending,  advance diet per surgery team.   - Monitor for gas/bowel movement.   - Pantoprazole 40mg IVP daily.     Renal//GYN  #Bladder Perforation with Pelvic Fluid Collection  - Discovery of 8mm bladder perforation on CT scan and status post OR repair and washout (6/18).   - Lora draining dark maroon colored urine. Monitor for clots.   - Maintain Lora in place.   - Urology Dr. Mitchell following.   - GYN Dr. Patterson following.     #Acute Pre-Renal Nonoliguric Renal Failure  - Pre-renal secondary to hypovolemia and hypotension.   - Resolving.   - Monitor renal function.     ID  B. Fragilis Bacteremia  - Blood cultures (6/18) resulted with B. Fragilis, urosepsis related to pelvic fluid collection from bladder perforation.   - Antibiotic coverage with Flagyl (6/18 - ?) and Ceftriaxone (6/17 - ?).  - Repeat cultures to evaluate for clearance   - ID Dr. Erica following.   - Midline catheter in place for antibiotic administration.     Heme/Onc  #Acute Blood Loss Induce Anemia  #Hemorrhagic Shock  - Status post 3 units PRBC. Closely monitor CBC   - Was on Lovenox prior to OR due to possible venous thrombus, surgery team reports oozing during class, likely contributing to blood loss.   - Hold all anticoagulation/antiplatelet agents.   #?Ovarian Vein Thrombosis   - CT (6/17) with "Question central filling defect within the right ovarian vein distally versus interdigitating fluid between branches. MRV can be obtained to   further evaluate for ovarian vein thrombosis."  - Had received full dose Lovenox, no additional doses due to active bleeding.   - Once acute issues are stabilized could benefit from further thrombus evaluation, may end up needing anticoagulation therapy.   #VTE Prophylaxis  - SCDs.   - Hold chemical VTE prophylaxis due to bleeding.     Endocrine  #No Acute Issues    Ethics/Disposition  - Full Code  - Maintain in ICU ASSESSMENT & PLAN:  46-year-old woman with history of a recent hysterectomy (6/8/22) for a cyst, presented to  on 6/17 with abdominal pain and fevers, found to have septic shock related to B. Fragilis bacteremia from a bladder perforation with peritoneal extravasation, emergent OR repaid of perforation with abdominal washout (6/18), admitted to ICU postoperatively for hypotension. Course complicated by acute blood loss anemia post operative and worsening septic shock requiring vasopressor support.      ACTIVE ISSUES:  #Septic Shock secondary to B. Fragilis Bacteremia  #Hemorrhagic Shock  #Bladder Perforation status post OR Repair (6/18)  #Pelvic Fluid Collection/Phlegmon Formation status post Washout in OR (6/18)  #Acute Pre-Renal Nonoliguric Renal Failure  #?Ovarian Vein Thrombosis     PLAN:  Neurologic  #Pain Control  - Morphine IVP PRN for postoperative pain.   - If continued renal function can add Toradol IVP.     Pulmonary:  #No Acute Issues  - Received a large volume of IVF during resuscitation monitor closely for development of pulmonary edema.   - Lung POCUS currently with Laurel, no B lines.   - Continuous pulse ox monitoring.     Cardiovascular  #Septic Shock secondary to B. Fragilis Bacteremia  #Hemorrhagic Shock  - Severe hypotension postoperatively from sepsis and hypovolemia.   - Received large volume IVF and PRBC x2 with improvement.   - Off vasopressors.   - Lactate normalized   - TTE ordered and pending.     GI  #Nutrition  - Cleared for clear diet by surgery attending,  advance diet per surgery team.   - Monitor for gas/bowel movement.   - Pantoprazole 40mg IVP daily.     #Constipation  - Bowel regimen  - Consider enema as last bm 6/16.    Renal//GYN  #Bladder Perforation with Pelvic Fluid Collection  - Discovery of 8mm bladder perforation on CT scan and status post OR repair and washout (6/18).   - Lora draining dark maroon colored urine. Monitor for clots.   - Maintain Lora in place.   - Urology Dr. Mitchell following.   - GYN Dr. Patterson following.     #Acute Pre-Renal Nonoliguric Renal Failure  - Pre-renal secondary to hypovolemia and hypotension.   - Resolving.   - Monitor renal function.     ID  B. Fragilis Bacteremia  - Blood cultures (6/18) resulted with B. Fragilis, urosepsis related to pelvic fluid collection from bladder perforation.   - Antibiotic coverage with Flagyl (6/18 - ?) and Ceftriaxone (6/17 - ?).  - Repeat cultures to evaluate for clearance   - ID Dr. Maldonado following.   - Midline catheter in place for antibiotic administration.     Heme/Onc  #Acute Blood Loss Induce Anemia  #Hemorrhagic Shock  - Status post 3 units PRBC. Closely monitor CBC   - Was on Lovenox prior to OR due to possible venous thrombus, surgery team reports oozing during class, likely contributing to blood loss.   - Hold all anticoagulation/antiplatelet agents.   #?Ovarian Vein Thrombosis   - CT (6/17) with "Question central filling defect within the right ovarian vein distally versus interdigitating fluid between branches. MRV can be obtained to   further evaluate for ovarian vein thrombosis."  - Had received full dose Lovenox, no additional doses due to active bleeding.   - Once acute issues are stabilized could benefit from further thrombus evaluation, may end up needing anticoagulation therapy.   #VTE Prophylaxis  - SCDs.   - Start lovenox 40    Endocrine  #No Acute Issues    Ethics/Disposition  - Full Code  - Maintain in ICU

## 2022-06-21 NOTE — PROGRESS NOTE ADULT - ASSESSMENT
45 y/o Female s/p Exploratory Laparotomy, Complex repair of laceration of bladder 6/18; Septic shock 2/2 Urosepsis, Hypovolemic shock secondary to blood loss, s/p 2U PRBCs    Tmax 100.4 overnight   WBC downtrending, 19 today   h/h 7.9/22.9   CT angio a/p and chest: no PE. No retroperitoneal or subcutaneous hematoma.     - continue kendall catheter   - monitor drain output   - continue diet   - continue abx per ID   - f/up cultures

## 2022-06-21 NOTE — PROGRESS NOTE ADULT - ASSESSMENT
45yo s/p 47 y/o Female s/p Exploratory Laparotomy with repair of laceration of bladder POD 3;  Septic shock 2/2 Urosepsis, Hypovolemic shock secondary to blood loss, s/p 2U PRBCs, currently stable; POD 13 s/p TLH/BS

## 2022-06-21 NOTE — PROGRESS NOTE ADULT - PROBLEM SELECTOR PLAN 1
continue postop care and pain mgmt  Urology following  ICU management  dressing and WALI drain to be followed per Urology

## 2022-06-21 NOTE — PROGRESS NOTE ADULT - SUBJECTIVE AND OBJECTIVE BOX
Interval events:   Tmax 100.4 at midnight   continues to be tachycardic, normotensive   urine output overnight recorded at 175 last documented at 10 pm  Urine is clear yellow   Drain output yesterday: 35 cc    SUBJECTIVE:  Reports pain well controlled. Tolerating diet. Denies N/V. Bowel Function   Ambulating independently       OBJECTIVE:  Vital Signs Last 24 Hrs  T(C): 37.3 (21 Jun 2022 04:00), Max: 38 (21 Jun 2022 00:00)  T(F): 99.1 (21 Jun 2022 04:00), Max: 100.4 (21 Jun 2022 00:00)  HR: 95 (21 Jun 2022 09:00) (93 - 128)  BP: 111/64 (21 Jun 2022 09:00) (104/68 - 132/83)  BP(mean): 72 (21 Jun 2022 09:00) (72 - 103)  RR: 19 (21 Jun 2022 09:00) (16 - 34)  SpO2: 95% (21 Jun 2022 09:00) (92% - 98%)    Physical Examination:  GEN: NAD, resting quietly  PULM: symmetric chest rise bilaterally, no increased WOB  ABD: soft, nt, nd, JPSS  : kendall secured, clear yellow urine     LABS:                        7.9    19.79 )-----------( 254      ( 21 Jun 2022 04:29 )             22.9       06-21    136  |  106  |  12  ----------------------------<  85  3.8   |  24  |  0.39<L>    Ca    7.9<L>      21 Jun 2022 04:29  Phos  1.8     06-21  Mg     2.3     06-21    TPro  5.3<L>  /  Alb  1.8<L>  /  TBili  0.6  /  DBili  x   /  AST  37  /  ALT  20  /  AlkPhos  62  06-21

## 2022-06-22 LAB
ALBUMIN SERPL ELPH-MCNC: 2.1 G/DL — LOW (ref 3.5–5)
ALP SERPL-CCNC: 78 U/L — SIGNIFICANT CHANGE UP (ref 40–120)
ALT FLD-CCNC: 46 U/L DA — SIGNIFICANT CHANGE UP (ref 10–60)
ANION GAP SERPL CALC-SCNC: 12 MMOL/L — SIGNIFICANT CHANGE UP (ref 5–17)
AST SERPL-CCNC: 83 U/L — HIGH (ref 10–40)
BASOPHILS # BLD AUTO: 0.04 K/UL — SIGNIFICANT CHANGE UP (ref 0–0.2)
BASOPHILS NFR BLD AUTO: 0.2 % — SIGNIFICANT CHANGE UP (ref 0–2)
BILIRUB SERPL-MCNC: 0.6 MG/DL — SIGNIFICANT CHANGE UP (ref 0.2–1.2)
BUN SERPL-MCNC: 11 MG/DL — SIGNIFICANT CHANGE UP (ref 7–18)
CALCIUM SERPL-MCNC: 8 MG/DL — LOW (ref 8.4–10.5)
CHLORIDE SERPL-SCNC: 105 MMOL/L — SIGNIFICANT CHANGE UP (ref 96–108)
CO2 SERPL-SCNC: 22 MMOL/L — SIGNIFICANT CHANGE UP (ref 22–31)
CREAT SERPL-MCNC: 0.44 MG/DL — LOW (ref 0.5–1.3)
CULTURE RESULTS: SIGNIFICANT CHANGE UP
EGFR: 120 ML/MIN/1.73M2 — SIGNIFICANT CHANGE UP
EOSINOPHIL # BLD AUTO: 0 K/UL — SIGNIFICANT CHANGE UP (ref 0–0.5)
EOSINOPHIL NFR BLD AUTO: 0 % — SIGNIFICANT CHANGE UP (ref 0–6)
GLUCOSE SERPL-MCNC: 90 MG/DL — SIGNIFICANT CHANGE UP (ref 70–99)
HCT VFR BLD CALC: 27.1 % — LOW (ref 34.5–45)
HGB BLD-MCNC: 9.4 G/DL — LOW (ref 11.5–15.5)
IMM GRANULOCYTES NFR BLD AUTO: 5.1 % — HIGH (ref 0–1.5)
LYMPHOCYTES # BLD AUTO: 1.61 K/UL — SIGNIFICANT CHANGE UP (ref 1–3.3)
LYMPHOCYTES # BLD AUTO: 9.2 % — LOW (ref 13–44)
MAGNESIUM SERPL-MCNC: 2.3 MG/DL — SIGNIFICANT CHANGE UP (ref 1.6–2.6)
MCHC RBC-ENTMCNC: 29 PG — SIGNIFICANT CHANGE UP (ref 27–34)
MCHC RBC-ENTMCNC: 34.7 GM/DL — SIGNIFICANT CHANGE UP (ref 32–36)
MCV RBC AUTO: 83.6 FL — SIGNIFICANT CHANGE UP (ref 80–100)
MONOCYTES # BLD AUTO: 1.02 K/UL — HIGH (ref 0–0.9)
MONOCYTES NFR BLD AUTO: 5.8 % — SIGNIFICANT CHANGE UP (ref 2–14)
NEUTROPHILS # BLD AUTO: 14.02 K/UL — HIGH (ref 1.8–7.4)
NEUTROPHILS NFR BLD AUTO: 79.7 % — HIGH (ref 43–77)
NRBC # BLD: 0 /100 WBCS — SIGNIFICANT CHANGE UP (ref 0–0)
PHOSPHATE SERPL-MCNC: 2.2 MG/DL — LOW (ref 2.5–4.5)
PLATELET # BLD AUTO: 386 K/UL — SIGNIFICANT CHANGE UP (ref 150–400)
POTASSIUM SERPL-MCNC: 3.7 MMOL/L — SIGNIFICANT CHANGE UP (ref 3.5–5.3)
POTASSIUM SERPL-SCNC: 3.7 MMOL/L — SIGNIFICANT CHANGE UP (ref 3.5–5.3)
PROT SERPL-MCNC: 6 G/DL — SIGNIFICANT CHANGE UP (ref 6–8.3)
RBC # BLD: 3.24 M/UL — LOW (ref 3.8–5.2)
RBC # FLD: 15.7 % — HIGH (ref 10.3–14.5)
SODIUM SERPL-SCNC: 139 MMOL/L — SIGNIFICANT CHANGE UP (ref 135–145)
SPECIMEN SOURCE: SIGNIFICANT CHANGE UP
WBC # BLD: 17.59 K/UL — HIGH (ref 3.8–10.5)
WBC # FLD AUTO: 17.59 K/UL — HIGH (ref 3.8–10.5)

## 2022-06-22 PROCEDURE — 71045 X-RAY EXAM CHEST 1 VIEW: CPT | Mod: 26

## 2022-06-22 RX ORDER — SODIUM,POTASSIUM PHOSPHATES 278-250MG
1 POWDER IN PACKET (EA) ORAL
Refills: 0 | Status: DISCONTINUED | OUTPATIENT
Start: 2022-06-22 | End: 2022-06-22

## 2022-06-22 RX ORDER — POTASSIUM PHOSPHATE, MONOBASIC POTASSIUM PHOSPHATE, DIBASIC 236; 224 MG/ML; MG/ML
15 INJECTION, SOLUTION INTRAVENOUS ONCE
Refills: 0 | Status: COMPLETED | OUTPATIENT
Start: 2022-06-22 | End: 2022-06-22

## 2022-06-22 RX ORDER — FUROSEMIDE 40 MG
20 TABLET ORAL ONCE
Refills: 0 | Status: DISCONTINUED | OUTPATIENT
Start: 2022-06-22 | End: 2022-06-22

## 2022-06-22 RX ORDER — PANTOPRAZOLE SODIUM 20 MG/1
40 TABLET, DELAYED RELEASE ORAL
Refills: 0 | Status: DISCONTINUED | OUTPATIENT
Start: 2022-06-22 | End: 2022-06-25

## 2022-06-22 RX ADMIN — Medication 1000 MILLIGRAM(S): at 05:04

## 2022-06-22 RX ADMIN — ENOXAPARIN SODIUM 40 MILLIGRAM(S): 100 INJECTION SUBCUTANEOUS at 10:43

## 2022-06-22 RX ADMIN — PANTOPRAZOLE SODIUM 40 MILLIGRAM(S): 20 TABLET, DELAYED RELEASE ORAL at 11:01

## 2022-06-22 RX ADMIN — Medication 1000 MILLIGRAM(S): at 14:18

## 2022-06-22 RX ADMIN — Medication 1000 MILLIGRAM(S): at 07:00

## 2022-06-22 RX ADMIN — Medication 1000 MILLIGRAM(S): at 14:47

## 2022-06-22 RX ADMIN — Medication 1000 MILLIGRAM(S): at 22:40

## 2022-06-22 RX ADMIN — MUPIROCIN 1 APPLICATION(S): 20 OINTMENT TOPICAL at 05:06

## 2022-06-22 RX ADMIN — CHLORHEXIDINE GLUCONATE 1 APPLICATION(S): 213 SOLUTION TOPICAL at 05:05

## 2022-06-22 RX ADMIN — Medication 100 MILLIGRAM(S): at 05:06

## 2022-06-22 RX ADMIN — Medication 100 MILLIGRAM(S): at 14:15

## 2022-06-22 RX ADMIN — MUPIROCIN 1 APPLICATION(S): 20 OINTMENT TOPICAL at 17:20

## 2022-06-22 RX ADMIN — Medication 2 PACKET(S): at 05:05

## 2022-06-22 RX ADMIN — Medication 100 MILLIGRAM(S): at 22:40

## 2022-06-22 RX ADMIN — CEFTRIAXONE 100 MILLIGRAM(S): 500 INJECTION, POWDER, FOR SOLUTION INTRAMUSCULAR; INTRAVENOUS at 15:20

## 2022-06-22 NOTE — PROGRESS NOTE ADULT - SUBJECTIVE AND OBJECTIVE BOX
Interval events:   ESCOBAR 11 cc yesterday     SUBJECTIVE:  Feeling better      OBJECTIVE:  Vital Signs Last 24 Hrs  T(C): 37.3 (22 Jun 2022 04:31), Max: 37.8 (21 Jun 2022 16:02)  T(F): 99.2 (22 Jun 2022 04:31), Max: 100.1 (21 Jun 2022 16:02)  HR: 81 (22 Jun 2022 07:00) (79 - 103)  BP: 109/62 (22 Jun 2022 07:00) (95/60 - 128/72)  BP(mean): 75 (22 Jun 2022 07:00) (68 - 85)  RR: 20 (22 Jun 2022 07:00) (19 - 33)  SpO2: 97% (22 Jun 2022 07:00) (92% - 99%)    Physical Examination:  GEN: NAD, resting quietly  PULM: symmetric chest rise bilaterally, no increased WOB  ABD: soft, nd, escobar ss   : kendall secured, clear urine      LABS:                        8.5    16.71 )-----------( 327      ( 21 Jun 2022 20:13 )             24.3       06-21    137  |  104  |  10  ----------------------------<  99  3.7   |  24  |  0.43<L>    Ca    7.9<L>      21 Jun 2022 20:13  Phos  2.4     06-21  Mg     2.3     06-21    TPro  5.3<L>  /  Alb  1.8<L>  /  TBili  0.6  /  DBili  x   /  AST  37  /  ALT  20  /  AlkPhos  62  06-21           Interval events:   ESCOBAR 11 cc yesterday     SUBJECTIVE:  Feeling better      OBJECTIVE:  Vital Signs Last 24 Hrs  T(C): 37.3 (22 Jun 2022 04:31), Max: 37.8 (21 Jun 2022 16:02)  T(F): 99.2 (22 Jun 2022 04:31), Max: 100.1 (21 Jun 2022 16:02)  HR: 81 (22 Jun 2022 07:00) (79 - 103)  BP: 109/62 (22 Jun 2022 07:00) (95/60 - 128/72)  BP(mean): 75 (22 Jun 2022 07:00) (68 - 85)  RR: 20 (22 Jun 2022 07:00) (19 - 33)  SpO2: 97% (22 Jun 2022 07:00) (92% - 99%)    Physical Examination:  GEN: NAD, resting quietly  PULM: symmetric chest rise bilaterally, no increased WOB  ABD: soft, nd, escobar ss, incision c/d/i   : kendall secured, clear urine      LABS:                        8.5    16.71 )-----------( 327      ( 21 Jun 2022 20:13 )             24.3       06-21    137  |  104  |  10  ----------------------------<  99  3.7   |  24  |  0.43<L>    Ca    7.9<L>      21 Jun 2022 20:13  Phos  2.4     06-21  Mg     2.3     06-21    TPro  5.3<L>  /  Alb  1.8<L>  /  TBili  0.6  /  DBili  x   /  AST  37  /  ALT  20  /  AlkPhos  62  06-21

## 2022-06-22 NOTE — PROGRESS NOTE ADULT - ASSESSMENT
A/P: POD #14 TLH/BS  POD #4 s/p re-op ex lap for bladder injury repair; admitted to ICU post operatively for septic shock 2/2 urosepsis  s/p 3 units prbc and albumin x2 units, s/p central line; leukocytosis downtrending, pt stable  -cont pain management  -continue management per ICU  -d/w Dr. Patterson

## 2022-06-22 NOTE — PHARMACOTHERAPY INTERVENTION NOTE - COMMENTS
Morphine PRN for Severe Pain was discontinued, as there is another active order for PRN Severe Pain.

## 2022-06-22 NOTE — PROGRESS NOTE ADULT - SUBJECTIVE AND OBJECTIVE BOX
POD #4   RIJ central line removed last night     Patient seen at bedside resting comfortably, found to be asleep. Pt reports SOB and difficulty breathing. Pt also reports nausea no vomiting, hasn't been able to tolerate regular diet yet. denies any other complaints at this time.  + Ambulation, voiding yellow concentrated urine into kendall.  + flatus; no bm; tolerating clear diet. Pt denies CP, V/D, dizziness, palpitations, vaginal bleeding or any other complaints.    ICU Vital Signs Last 24 Hrs  T(C): 37.3 (22 Jun 2022 04:31), Max: 37.8 (21 Jun 2022 16:02)  T(F): 99.2 (22 Jun 2022 04:31), Max: 100.1 (21 Jun 2022 16:02)  HR: 81 (22 Jun 2022 07:00) (79 - 103)  BP: 109/62 (22 Jun 2022 07:00) (95/60 - 128/72)  BP(mean): 75 (22 Jun 2022 07:00) (68 - 85)  ABP: --  ABP(mean): --  RR: 20 (22 Jun 2022 07:00) (19 - 33)  SpO2: 97% (22 Jun 2022 07:00) (92% - 99%)      Gen: A&O x 3, NAD, nasal cannula in place  Chest: CTA B/L  Cardiac: S1,S2  RRR  Abdomen: +BS; soft; Nontender, nondistended, dressingC/D/I  WALI drain: 5cc dark brown serosanguinous fluid   Gyn: no vaginal bleeding   Extremities: Nontender, no worsening edema                          8.5    16.71 )-----------( 327      ( 21 Jun 2022 20:13 )             24.3     06-21    137  |  104  |  10  ----------------------------<  99  3.7   |  24  |  0.43<L>    Ca    7.9<L>      21 Jun 2022 20:13  Phos  2.4     06-21  Mg     2.3     06-21    TPro  5.3<L>  /  Alb  1.8<L>  /  TBili  0.6  /  DBili  x   /  AST  37  /  ALT  20  /  AlkPhos  62  06-21       POD #4   RIJ central line removed last night     Patient seen at bedside resting comfortably, found to be asleep. Pt states pain is well controlled. Pt reports SOB and difficulty breathing. Pt also reports nausea no vomiting, hasn't been able to tolerate regular diet yet. denies any other complaints at this time.  + Ambulation, voiding yellow concentrated urine into kendall.  + flatus; no bm; tolerating clear diet. Pt denies CP, V/D, dizziness, palpitations, vaginal bleeding or any other complaints.    ICU Vital Signs Last 24 Hrs  T(C): 37.3 (22 Jun 2022 04:31), Max: 37.8 (21 Jun 2022 16:02)  T(F): 99.2 (22 Jun 2022 04:31), Max: 100.1 (21 Jun 2022 16:02)  HR: 81 (22 Jun 2022 07:00) (79 - 103)  BP: 109/62 (22 Jun 2022 07:00) (95/60 - 128/72)  BP(mean): 75 (22 Jun 2022 07:00) (68 - 85)  ABP: --  ABP(mean): --  RR: 20 (22 Jun 2022 07:00) (19 - 33)  SpO2: 97% (22 Jun 2022 07:00) (92% - 99%)      Gen: A&O x 3, NAD, nasal cannula in place  Chest: CTA B/L  Cardiac: S1,S2  RRR  Abdomen: +BS; soft; Nontender, nondistended, dressingC/D/I  WALI drain: 5cc dark brown serosanguinous fluid   Gyn: no vaginal bleeding   Extremities: Nontender, no worsening edema                          8.5    16.71 )-----------( 327      ( 21 Jun 2022 20:13 )             24.3     06-21    137  |  104  |  10  ----------------------------<  99  3.7   |  24  |  0.43<L>    Ca    7.9<L>      21 Jun 2022 20:13  Phos  2.4     06-21  Mg     2.3     06-21    TPro  5.3<L>  /  Alb  1.8<L>  /  TBili  0.6  /  DBili  x   /  AST  37  /  ALT  20  /  AlkPhos  62  06-21

## 2022-06-22 NOTE — PROGRESS NOTE ADULT - ASSESSMENT
45 y/o Female s/p Exploratory Laparotomy, Complex repair of laceration of bladder 6/18; Septic shock 2/2 Urosepsis, Hypovolemic shock secondary to blood loss, s/p 2U PRBCs    WBC downtrending, 16.7 today   h/h 8.5/25  CT angio a/p and chest: no PE. No retroperitoneal or subcutaneous hematoma.     - continue kendall catheter   - monitor drain output   - continue diet   - continue abx per ID   - f/up cultures           47 y/o Female s/p Exploratory Laparotomy, Complex repair of laceration of bladder 6/18; Septic shock 2/2 Urosepsis, Hypovolemic shock secondary to blood loss, s/p 2U PRBCs    WBC downtrending, 16.7 today   h/h 8.5/25    - continue kendall catheter at least 2 weeks, cystogram prior to kendall catheter removal   - monitor drain output, keep drain   - continue diet   - continue abx per ID   - f/up cultures

## 2022-06-22 NOTE — DIETITIAN INITIAL EVALUATION ADULT - NS FNS DIET ORDER
Diet, Regular:   Supplement Feeding Modality:  Oral  Ensure Enlive Cans or Servings Per Day:  1       Frequency:  Two Times a day (06-22-22 @ 10:45)

## 2022-06-22 NOTE — CHART NOTE - NSCHARTNOTEFT_GEN_A_CORE
46-year-old woman with history of a recent hysterectomy (6/8/22) for a cyst, presented to  on 6/17 with abdominal pain and fevers, found to have septic shock related to B. Fragilis bacteremia from a bladder perforation with peritoneal extravasation, emergent OR repaid of perforation with abdominal washout (6/18), admitted to ICU postoperatively for hypotension. Course complicated by acute blood loss anemia post operative and worsening septic shock requiring vasopressor support.      ICU Course:  Pt volume resuscitated in ICU and started on levophed then later transitioned to phenylephrine due to sinus tachycardia. Pt with Hb drop from Hb 12 to 6.8 requiring total 3u pRBC. Pain control with prn oxy and morphine. Pt with hypoxia and b/l pleural effusions, given Lasix 20iv qd x2. Pt now off O2 satting >94% on room air. POCUS on 6/22 still showed b/l pleural effusions. Pt with B. fragilis bacteremia started on flagyl q8. TTE with normal EF and diastolic function. Pt started on diet. Pt started on bowel regimen on icu transfer then discontinued on 6/21. Pt with frequent bowel movement 6/22 likely due to bowel regimen. CTA chest, abdomen pelvis did not show pe or revisualization of ovarian vein thrombosis (but uncertain as reproductive organs are not well visualized). Pt with GIOVANI, as creatine was 1.4 now downtrended to 0.4, resolved. Lovenox dvt prophylaxis resumed 6/22.   Pt is stable to DG to surgery. Signed out to surgery PA     Follow Up:  Keep Lora catheter x2 weeks - will need urocytogram prior to removal as per urology  Monitor Hb qd  f/u blood cultures to be collected 6/24  f/u id for length of abx treatment, pending repeat blood cultures on 6/24 46-year-old woman with history of a recent hysterectomy (6/8/22) for a cyst, presented to  on 6/17 with abdominal pain and fevers, found to have septic shock related to B. Fragilis bacteremia from a bladder perforation with peritoneal extravasation, emergent OR repaid of perforation with abdominal washout (6/18), admitted to ICU postoperatively for hypotension. Course complicated by acute blood loss anemia post operative and worsening septic shock requiring vasopressor support.      ICU Course:  Pt volume resuscitated in ICU and started on levophed then later transitioned to phenylephrine due to sinus tachycardia. Pt with Hb drop from Hb 12 to 6.8 requiring total 3u pRBC. Pain control with prn oxy and morphine. Pt with hypoxia and b/l pleural effusions, given Lasix 20iv qd x2. Pt now off O2 satting >94% on room air. POCUS on 6/22 still showed b/l pleural effusions. Pt with B. fragilis bacteremia started on flagyl q8. TTE with normal EF and diastolic function. Pt started on diet. Pt started on bowel regimen on icu transfer then discontinued on 6/21. Pt with frequent bowel movement 6/22 likely due to bowel regimen. CTA chest, abdomen pelvis did not show pe or revisualization of ovarian vein thrombosis (but uncertain as reproductive organs are not well visualized). Pt with GIOVANI, as creatine was 1.4 now downtrended to 0.4, resolved. Lovenox dvt prophylaxis resumed 6/22.   Pt is stable to DG to surgery. Signed out to Ob/Gyn YAMILEX Baldwin    Follow Up:  Keep Lora catheter x2 weeks - will need urocytogram prior to removal as per urology  Monitor Hb qd  f/u blood cultures to be collected 6/24  f/u id for length of abx treatment, pending repeat blood cultures on 6/24

## 2022-06-22 NOTE — PROGRESS NOTE ADULT - SUBJECTIVE AND OBJECTIVE BOX
INTERVAL HPI/OVERNIGHT EVENTS:   No acute events overnight.    PRESSORS: [ ] YES [ ] NO  WHICH:    ANTIBIOTICS:                      Antimicrobial:  cefTRIAXone   IVPB      cefTRIAXone   IVPB 1000 milliGRAM(s) IV Intermittent every 24 hours  metroNIDAZOLE  IVPB      metroNIDAZOLE  IVPB 500 milliGRAM(s) IV Intermittent every 8 hours    Cardiovascular:    Pulmonary:    Hematalogic:  enoxaparin Injectable 40 milliGRAM(s) SubCutaneous every 24 hours    Other:  acetaminophen     Tablet .. 1000 milliGRAM(s) Oral every 8 hours  aluminum hydroxide/magnesium hydroxide/simethicone Suspension 30 milliLiter(s) Oral every 6 hours PRN  chlorhexidine 2% Cloths 1 Application(s) Topical <User Schedule>  influenza   Vaccine 0.5 milliLiter(s) IntraMuscular once  mupirocin 2% Ointment 1 Application(s) Both Nostrils two times a day  naloxone Injectable 0.4 milliGRAM(s) IV Push once  ondansetron Injectable 4 milliGRAM(s) IV Push every 6 hours PRN  oxyCODONE    IR 5 milliGRAM(s) Oral every 4 hours PRN  oxyCODONE    IR 2.5 milliGRAM(s) Oral every 4 hours PRN  pantoprazole  Injectable 40 milliGRAM(s) IV Push daily  sodium chloride 0.9% lock flush 10 milliLiter(s) IV Push every 1 hour PRN    acetaminophen     Tablet .. 1000 milliGRAM(s) Oral every 8 hours  aluminum hydroxide/magnesium hydroxide/simethicone Suspension 30 milliLiter(s) Oral every 6 hours PRN  cefTRIAXone   IVPB      cefTRIAXone   IVPB 1000 milliGRAM(s) IV Intermittent every 24 hours  chlorhexidine 2% Cloths 1 Application(s) Topical <User Schedule>  enoxaparin Injectable 40 milliGRAM(s) SubCutaneous every 24 hours  influenza   Vaccine 0.5 milliLiter(s) IntraMuscular once  metroNIDAZOLE  IVPB      metroNIDAZOLE  IVPB 500 milliGRAM(s) IV Intermittent every 8 hours  mupirocin 2% Ointment 1 Application(s) Both Nostrils two times a day  naloxone Injectable 0.4 milliGRAM(s) IV Push once  ondansetron Injectable 4 milliGRAM(s) IV Push every 6 hours PRN  oxyCODONE    IR 5 milliGRAM(s) Oral every 4 hours PRN  oxyCODONE    IR 2.5 milliGRAM(s) Oral every 4 hours PRN  pantoprazole  Injectable 40 milliGRAM(s) IV Push daily  sodium chloride 0.9% lock flush 10 milliLiter(s) IV Push every 1 hour PRN    Drug Dosing Weight  Height (cm): 157.5 (17 Jun 2022 09:54)  Weight (kg): 53.1 (17 Jun 2022 09:54)  BMI (kg/m2): 21.4 (17 Jun 2022 09:54)  BSA (m2): 1.52 (17 Jun 2022 09:54)    CENTRAL LINE: [ ] YES [ ] NO  LOCATION:   DATE INSERTED:  REMOVE: [ ] YES [ ] NO  EXPLAIN:    DIAZ: [ ] YES [ ] NO    DATE INSERTED:  REMOVE:  [ ] YES [ ] NO  EXPLAIN:    A-LINE:  [ ] YES [ ] NO  LOCATION:   DATE INSERTED:  REMOVE:  [ ] YES [ ] NO  EXPLAIN:    PMH -reviewed admission note, no change since admission    ICU Vital Signs Last 24 Hrs  T(C): 37.1 (22 Jun 2022 11:00), Max: 37.8 (21 Jun 2022 16:02)  T(F): 98.8 (22 Jun 2022 11:00), Max: 100.1 (21 Jun 2022 16:02)  HR: 91 (22 Jun 2022 12:00) (79 - 103)  BP: 114/69 (22 Jun 2022 12:00) (95/60 - 128/72)  BP(mean): 80 (22 Jun 2022 12:00) (68 - 85)  ABP: --  ABP(mean): --  RR: 30 (22 Jun 2022 12:00) (19 - 33)  SpO2: 94% (22 Jun 2022 12:00) (92% - 99%)            06-21 @ 07:01  -  06-22 @ 07:00  --------------------------------------------------------  IN: 412.5 mL / OUT: 3506 mL / NET: -3093.5 mL            PHYSICAL EXAM:    GENERAL: NAD, well-groomed, well-developed  HEAD:  Atraumatic, Normocephalic  EYES: EOMI, PERRLA, conjunctiva and sclera clear  ENMT: No tonsillar erythema, exudates, or enlargement; Moist mucous membranes, Good dentition, No lesions  NECK: Supple, normal appearance, No JVD; Normal thyroid; Trachea midline  NERVOUS SYSTEM:  Alert & Oriented X3, Good concentration; Motor Strength 5/5 B/L upper and lower extremities; DTRs 2+ intact and symmetric  CHEST/LUNG: No chest deformity; Normal percussion bilaterally; No rales, rhonchi, wheezing   HEART: Regular rate and rhythm; No murmurs, rubs, or gallops  ABDOMEN: Soft, Nontender, Nondistended; Bowel sounds present  EXTREMITIES:  2+ Peripheral Pulses, No clubbing, cyanosis, or edema  LYMPH: No lymphadenopathy noted  SKIN: No rashes or lesions; Good capillary refill      LABS:  CBC Full  -  ( 22 Jun 2022 09:28 )  WBC Count : 17.59 K/uL  RBC Count : 3.24 M/uL  Hemoglobin : 9.4 g/dL  Hematocrit : 27.1 %  Platelet Count - Automated : 386 K/uL  Mean Cell Volume : 83.6 fl  Mean Cell Hemoglobin : 29.0 pg  Mean Cell Hemoglobin Concentration : 34.7 gm/dL  Auto Neutrophil # : 14.02 K/uL  Auto Lymphocyte # : 1.61 K/uL  Auto Monocyte # : 1.02 K/uL  Auto Eosinophil # : 0.00 K/uL  Auto Basophil # : 0.04 K/uL  Auto Neutrophil % : 79.7 %  Auto Lymphocyte % : 9.2 %  Auto Monocyte % : 5.8 %  Auto Eosinophil % : 0.0 %  Auto Basophil % : 0.2 %    06-22    139  |  105  |  11  ----------------------------<  90  3.7   |  22  |  0.44<L>    Ca    8.0<L>      22 Jun 2022 09:28  Phos  2.2     06-22  Mg     2.3     06-22    TPro  6.0  /  Alb  2.1<L>  /  TBili  0.6  /  DBili  x   /  AST  83<H>  /  ALT  46  /  AlkPhos  78  06-22            RADIOLOGY & ADDITIONAL STUDIES REVIEWED:  ***    GOALS OF CARE DISCUSSION WITH PATIENT/FAMILY/PROXY:    CRITICAL CARE TIME SPENT: 35 minutes

## 2022-06-22 NOTE — PROGRESS NOTE ADULT - ASSESSMENT
ASSESSMENT & PLAN:  46-year-old woman with history of a recent hysterectomy (6/8/22) for a cyst, presented to  on 6/17 with abdominal pain and fevers, found to have septic shock related to B. Fragilis bacteremia from a bladder perforation with peritoneal extravasation, emergent OR repaid of perforation with abdominal washout (6/18), admitted to ICU postoperatively for hypotension. Course complicated by acute blood loss anemia post operative and worsening septic shock requiring vasopressor support.      ACTIVE ISSUES:  #Septic Shock secondary to B. Fragilis Bacteremia  #Hemorrhagic Shock  #Bladder Perforation status post OR Repair (6/18)  #Pelvic Fluid Collection/Phlegmon Formation status post Washout in OR (6/18)  #Acute Pre-Renal Nonoliguric Renal Failure  #?Ovarian Vein Thrombosis     PLAN:  Neurologic  #Pain Control  - oxy PRN for postoperative pain.     Pulmonary:  #No Acute Issues  - Received a large volume of IVF during resuscitation monitor closely for development of pulmonary edema.   - Lung POCUS w/ b/l pleural effusions   - Continuous pulse ox monitoring    Cardiovascular  #Septic Shock secondary to B. Fragilis Bacteremia  #Hemorrhagic Shock  - Severe hypotension postoperatively from sepsis and hypovolemia.   - Received large volume IVF and PRBC x2 with improvement.   - Off vasopressors.   - Lactate normalized   - TTE normal ef, normal diastolic function    GI  #Nutrition  - Cleared for clear diet by surgery attending,  advance diet per surgery team.   - Monitor for gas/bowel movement.   - Pantoprazole 40mg PO    #Constipation  - Bowel regimen d/c'ed  - moved bowels 6/21, 6/22.    Renal//GYN  #Bladder Perforation with Pelvic Fluid Collection  - Discovery of 8mm bladder perforation on CT scan and status post OR repair and washout (6/18).   - Lroa draining dark maroon colored urine. Monitor for clots.   - Maintain Lora in place x2 wks, will need cystogram prior to removal as per urology  - Urology Dr. Mitchell following.   - GYN Dr. Patterson following.     #Acute Pre-Renal Nonoliguric Renal Failure  - Pre-renal secondary to hypovolemia and hypotension.   - Resolving.   - Monitor renal function.     ID  B. Fragilis Bacteremia  - Blood cultures (6/18) resulted with B. Fragilis, urosepsis related to pelvic fluid collection from bladder perforation.   - Antibiotic coverage with Flagyl (6/18 - ?) and Ceftriaxone (6/17 - ?).  - Repeat cultures to evaluate for clearance   - ID Dr. Maldonado following.   - Midline catheter in place for antibiotic administration.     Heme/Onc  #Acute Blood Loss Induce Anemia  #Hemorrhagic Shock  - Status post 3 units PRBC. Closely monitor CBC   - Was on Lovenox prior to OR due to possible venous thrombus, surgery team reports oozing, likely contributing to blood loss.   - Hold all anticoagulation/antiplatelet agents.     #?Ovarian Vein Thrombosis   - CT (6/17) with "Question central filling defect within the right ovarian vein distally versus interdigitating fluid between branches. MRV can be obtained to   further evaluate for ovarian vein thrombosis."  - Repeat CTA ap not able to visualize   - Had received full dose Lovenox, no additional doses due to active bleeding.   - Once acute issues are stabilized could benefit from further thrombus evaluation, may end up needing anticoagulation therapy.   #VTE Prophylaxis  - SCDs.   - lovenox 40    Endocrine  #No Acute Issues    Ethics/Disposition  - Full Code  - DG to EDISON ASSESSMENT & PLAN:  46-year-old woman with history of a recent hysterectomy (6/8/22) for a cyst, presented to  on 6/17 with abdominal pain and fevers, found to have septic shock related to B. Fragilis bacteremia from a bladder perforation with peritoneal extravasation, emergent OR repaid of perforation with abdominal washout (6/18), admitted to ICU postoperatively for hypotension. Course complicated by acute blood loss anemia post operative and worsening septic shock requiring vasopressor support.      ACTIVE ISSUES:  #Septic Shock secondary to B. Fragilis Bacteremia  #Hemorrhagic Shock  #Bladder Perforation status post OR Repair (6/18)  #Pelvic Fluid Collection/Phlegmon Formation status post Washout in OR (6/18)  #Acute Pre-Renal Nonoliguric Renal Failure  #?Ovarian Vein Thrombosis     PLAN:  Neurologic  #Pain Control  - oxy PRN for postoperative pain.     Pulmonary:  #b/l Pleural effusions  - Received a large volume of IVF during resuscitation monitor closely for development of pulmonary edema.   - Lung POCUS w/ b/l pleural effusions   - Diuresis x2 days, with good uo  - Continuous pulse ox monitoring    Cardiovascular  #Septic Shock secondary to B. Fragilis Bacteremia  #Hemorrhagic Shock  - Severe hypotension postoperatively from sepsis and hypovolemia.   - Received large volume IVF and PRBC x2 with improvement.   - Off vasopressors.   - Lactate normalized   - TTE normal ef, normal diastolic function    GI  #Nutrition  - Cleared for clear diet by surgery attending,  advance diet per surgery team.   - Monitor for gas/bowel movement.   - Pantoprazole 40mg PO    #Constipation  - Bowel regimen d/c'ed  - moved bowels 6/21, 6/22.    Renal//GYN  #Bladder Perforation with Pelvic Fluid Collection  - Discovery of 8mm bladder perforation on CT scan and status post OR repair and washout (6/18).   - Lora draining dark maroon colored urine. Monitor for clots.   - Maintain Lora in place x2 wks, will need cystogram prior to removal as per urology  - Urology Dr. Mitchell following.   - GYN Dr. Patterson following.     #Acute Pre-Renal Nonoliguric Renal Failure  - Pre-renal secondary to hypovolemia and hypotension.   - Resolving.   - Monitor renal function.     ID  B. Fragilis Bacteremia  - Blood cultures (6/18) resulted with B. Fragilis, urosepsis related to pelvic fluid collection from bladder perforation.   - Antibiotic coverage with Flagyl (6/18 - ?) and Ceftriaxone (6/17 - ?).  - Repeat cultures to evaluate for clearance   - ID Dr. Maldonado following.   - Midline catheter in place for antibiotic administration.   - Will need repeat cultures 6/24    Heme/Onc  #Acute Blood Loss Induce Anemia  #Hemorrhagic Shock  - Status post 3 units PRBC. Closely monitor CBC   - Was on Lovenox prior to OR due to possible venous thrombus, surgery team reports oozing, likely contributing to blood loss.   - Hold all anticoagulation/antiplatelet agents.     #?Ovarian Vein Thrombosis   - CT (6/17) with "Question central filling defect within the right ovarian vein distally versus interdigitating fluid between branches. MRV can be obtained to   further evaluate for ovarian vein thrombosis."  - Repeat CTA ap not able to visualize   - Had received full dose Lovenox, no additional doses due to active bleeding.   - Once acute issues are stabilized could benefit from further thrombus evaluation, may end up needing anticoagulation therapy.   #VTE Prophylaxis  - SCDs.   - lovenox 40    Endocrine  #No Acute Issues    Ethics/Disposition  - Full Code  - DG to EDISON

## 2022-06-22 NOTE — DIETITIAN INITIAL EVALUATION ADULT - PERTINENT LABORATORY DATA
06-22    139  |  105  |  11  ----------------------------<  90  3.7   |  22  |  0.44<L>    Ca    8.0<L>      22 Jun 2022 09:28  Phos  2.2     06-22  Mg     2.3     06-22    TPro  6.0  /  Alb  2.1<L>  /  TBili  0.6  /  DBili  x   /  AST  83<H>  /  ALT  46  /  AlkPhos  78  06-22  A1C with Estimated Average Glucose Result: 5.1 % (06-03-22 @ 18:55)

## 2022-06-22 NOTE — DIETITIAN INITIAL EVALUATION ADULT - PERTINENT MEDS FT
MEDICATIONS  (STANDING):  acetaminophen     Tablet .. 1000 milliGRAM(s) Oral every 8 hours  cefTRIAXone   IVPB      cefTRIAXone   IVPB 1000 milliGRAM(s) IV Intermittent every 24 hours  chlorhexidine 2% Cloths 1 Application(s) Topical <User Schedule>  enoxaparin Injectable 40 milliGRAM(s) SubCutaneous every 24 hours  influenza   Vaccine 0.5 milliLiter(s) IntraMuscular once  metroNIDAZOLE  IVPB 500 milliGRAM(s) IV Intermittent every 8 hours  metroNIDAZOLE  IVPB      mupirocin 2% Ointment 1 Application(s) Both Nostrils two times a day  naloxone Injectable 0.4 milliGRAM(s) IV Push once  pantoprazole    Tablet 40 milliGRAM(s) Oral before breakfast    MEDICATIONS  (PRN):  aluminum hydroxide/magnesium hydroxide/simethicone Suspension 30 milliLiter(s) Oral every 6 hours PRN Dyspepsia  ondansetron Injectable 4 milliGRAM(s) IV Push every 6 hours PRN Nausea and/or Vomiting  oxyCODONE    IR 2.5 milliGRAM(s) Oral every 4 hours PRN Moderate Pain (4 - 6)  oxyCODONE    IR 5 milliGRAM(s) Oral every 4 hours PRN Severe Pain (7 - 10)  sodium chloride 0.9% lock flush 10 milliLiter(s) IV Push every 1 hour PRN Pre/post blood products, medications, blood draw, and to maintain line patency

## 2022-06-22 NOTE — PHYSICAL THERAPY INITIAL EVALUATION ADULT - GENERAL OBSERVATIONS, REHAB EVAL
Pt. received in supine; AxOX3, s/p abd surgery POD#4, pt. w/ WALI drain, IV and cardiac monitor. Pt. w/ Lora catheter.

## 2022-06-22 NOTE — PROGRESS NOTE ADULT - ATTENDING COMMENTS
IMP: This is a  46 yr old woman  who was admitted with  pelvic abscess and fever s/p hysterectomy 6/8/2022, comes in with abdominal pain, was found to have bladder perforation. S/p exploratory laparotomy, repair of bladder perforation, abdominal washout 6/18 became hypotensive. Was given 4L IVF. Patient was  admitted to ICU for septic shock requiring pressors and hemodynamic monitoring     Assessment:    - Septic shock   - Intra- abdominal abscess  - Bacteremia due to B fragilis   - Hysterectomy 6/8  - Anemia due to acute blood loss   - Bladder perf s/p blader repair  - GIOVANI      Plan   - Pain control as needed   - O2 supp as needed   - Hemodynamic monitoring   - Off pressors   - Monitor CBC q6h  - Type and cross and transfuse as needed   - No Chemical anticoag   - NPO for now , monitor for BM and Flagyl   - Continue iv antibx for GNR/ B fragilis coverage   - ID eval noted: continue rocephine and flagyl   - Urology and GYN f/u noted : keep kendall   - Keep Kendall's cath in place   - GYN f/u noted
IMP: This is a  46 yr old woman  who was admitted with  pelvic abscess and fever s/p hysterectomy 6/8/2022, comes in with abdominal pain, was found to have bladder perforation. S/p exploratory laparotomy, repair of bladder perforation, abdominal washout 6/18 became hypotensive. Was given 4L IVF. Patient was  admitted to ICU for septic shock requiring pressors and hemodynamic monitoring     Assessment:    - Septic shock   - Intra- abdominal abscess  - Bacteremia due to B fragilis   - Hysterectomy 6/8  - Anemia due to acute blood loss   - Bladder perf s/p blader repair  - GIOVANI      Plan   - Pain control as needed   - O2 supp as needed   - Hemodynamic monitoring   - Off pressors   - Monitor CBC relatively stable   - Started on DVT prophy  - Advance diet   - Continue iv antibx for GNR/ B fragilis coverage   - ID eval noted: continue rocephine and flagyl   - Urology and GYN f/u noted :  - Repeat CT with contrast of abd/pelvis : neg for bleed or new collection  - Keep Lora's cath in place   - OOB to chair   - BM   - D/C all lines     - Incentive spirometry .
IMP: This is a  46 yr old woman  who was admitted with  pelvic abscess and fever s/p hysterectomy 6/8/2022, comes in with abdominal pain, was found to have bladder perforation. S/p exploratory laparotomy, repair of bladder perforation, abdominal washout 6/18 became hypotensive. Was given 4L IVF. Patient was  admitted to ICU for septic shock requiring pressors and hemodynamic monitoring     Assessment:    - Septic shock   - Intra- abdominal abscess  - Bacteremia due to B fragilis   - Hysterectomy 6/8  - Anemia due to acute blood loss   - Bladder perf s/p blader repair  - GIOVANI      Plan   - Pain control as needed   - O2 supp as needed   - Hemodynamic monitoring   - Off pressors   - Monitor CBC relatively stable   - Started on DVT prophy  - Started on soft diet   - Continue iv antibx for GNR/ B fragilis coverage   - ID eval noted: continue rocephine and flagyl   - Urology and GYN f/u noted :  - Repeat CT with contrast of abd/pelvis : neg for bleed or new collection  - Keep Lora's cath in place   - OOB to chair   - Incentive spirometry
IMP: This is a  46 yr old woman  who was admitted with  pelvic abscess and fever s/p hysterectomy 6/8/2022, comes in with abdominal pain, was found to have bladder perforation. S/p exploratory laparotomy, repair of bladder perforation, abdominal washout 6/18 became hypotensive. Was given 4L IVF. Patient was  admitted to ICU for septic shock requiring pressors and hemodynamic monitoring     Assessment:    - Septic shock   - Intra- abdominal abscess  - Bacteremia due to B fragilis   - Hysterectomy 6/8  - Anemia due to acute blood loss   - Bladder perf s/p blader repair  - GIOVANI      Plan   - Pain control as needed   - O2 supp as needed   - Hemodynamic monitoring   - Tapered off pressors   - S/P transfusion  2 unts PRBC  - Monitor CBC q6h  - Type and cross and transfuse as needed   - No Chemical anticoag   - Continue iv antibx for GNR/ B fragilis coverage   - ID eval   - Urology and GYN f/u noted   - Keep Lora's cath in place   - Diet

## 2022-06-23 ENCOUNTER — APPOINTMENT (OUTPATIENT)
Dept: OBGYN | Facility: CLINIC | Age: 47
End: 2022-06-23

## 2022-06-23 LAB
ALBUMIN SERPL ELPH-MCNC: 2 G/DL — LOW (ref 3.5–5)
ALP SERPL-CCNC: 75 U/L — SIGNIFICANT CHANGE UP (ref 40–120)
ALT FLD-CCNC: 44 U/L DA — SIGNIFICANT CHANGE UP (ref 10–60)
ANION GAP SERPL CALC-SCNC: 10 MMOL/L — SIGNIFICANT CHANGE UP (ref 5–17)
AST SERPL-CCNC: 68 U/L — HIGH (ref 10–40)
BASOPHILS # BLD AUTO: 0.06 K/UL — SIGNIFICANT CHANGE UP (ref 0–0.2)
BASOPHILS NFR BLD AUTO: 0.3 % — SIGNIFICANT CHANGE UP (ref 0–2)
BILIRUB SERPL-MCNC: 0.6 MG/DL — SIGNIFICANT CHANGE UP (ref 0.2–1.2)
BUN SERPL-MCNC: 9 MG/DL — SIGNIFICANT CHANGE UP (ref 7–18)
CALCIUM SERPL-MCNC: 7.7 MG/DL — LOW (ref 8.4–10.5)
CHLORIDE SERPL-SCNC: 106 MMOL/L — SIGNIFICANT CHANGE UP (ref 96–108)
CO2 SERPL-SCNC: 22 MMOL/L — SIGNIFICANT CHANGE UP (ref 22–31)
CREAT SERPL-MCNC: 0.46 MG/DL — LOW (ref 0.5–1.3)
EGFR: 119 ML/MIN/1.73M2 — SIGNIFICANT CHANGE UP
EOSINOPHIL # BLD AUTO: 0 K/UL — SIGNIFICANT CHANGE UP (ref 0–0.5)
EOSINOPHIL NFR BLD AUTO: 0 % — SIGNIFICANT CHANGE UP (ref 0–6)
GLUCOSE SERPL-MCNC: 103 MG/DL — HIGH (ref 70–99)
HCT VFR BLD CALC: 25.7 % — LOW (ref 34.5–45)
HGB BLD-MCNC: 9.1 G/DL — LOW (ref 11.5–15.5)
IMM GRANULOCYTES NFR BLD AUTO: 5.8 % — HIGH (ref 0–1.5)
LYMPHOCYTES # BLD AUTO: 1.6 K/UL — SIGNIFICANT CHANGE UP (ref 1–3.3)
LYMPHOCYTES # BLD AUTO: 8.3 % — LOW (ref 13–44)
MCHC RBC-ENTMCNC: 29.8 PG — SIGNIFICANT CHANGE UP (ref 27–34)
MCHC RBC-ENTMCNC: 35.4 GM/DL — SIGNIFICANT CHANGE UP (ref 32–36)
MCV RBC AUTO: 84.3 FL — SIGNIFICANT CHANGE UP (ref 80–100)
MONOCYTES # BLD AUTO: 1.11 K/UL — HIGH (ref 0–0.9)
MONOCYTES NFR BLD AUTO: 5.7 % — SIGNIFICANT CHANGE UP (ref 2–14)
NEUTROPHILS # BLD AUTO: 15.46 K/UL — HIGH (ref 1.8–7.4)
NEUTROPHILS NFR BLD AUTO: 79.9 % — HIGH (ref 43–77)
NRBC # BLD: 0 /100 WBCS — SIGNIFICANT CHANGE UP (ref 0–0)
PLATELET # BLD AUTO: 443 K/UL — HIGH (ref 150–400)
POTASSIUM SERPL-MCNC: 3.4 MMOL/L — LOW (ref 3.5–5.3)
POTASSIUM SERPL-SCNC: 3.4 MMOL/L — LOW (ref 3.5–5.3)
PROT SERPL-MCNC: 5.7 G/DL — LOW (ref 6–8.3)
RBC # BLD: 3.05 M/UL — LOW (ref 3.8–5.2)
RBC # FLD: 15.9 % — HIGH (ref 10.3–14.5)
SODIUM SERPL-SCNC: 138 MMOL/L — SIGNIFICANT CHANGE UP (ref 135–145)
WBC # BLD: 19.36 K/UL — HIGH (ref 3.8–10.5)
WBC # FLD AUTO: 19.36 K/UL — HIGH (ref 3.8–10.5)

## 2022-06-23 RX ORDER — FAMOTIDINE 10 MG/ML
20 INJECTION INTRAVENOUS
Refills: 0 | Status: DISCONTINUED | OUTPATIENT
Start: 2022-06-23 | End: 2022-06-25

## 2022-06-23 RX ADMIN — Medication 1000 MILLIGRAM(S): at 06:55

## 2022-06-23 RX ADMIN — Medication 1000 MILLIGRAM(S): at 21:25

## 2022-06-23 RX ADMIN — Medication 100 MILLIGRAM(S): at 15:39

## 2022-06-23 RX ADMIN — CEFTRIAXONE 100 MILLIGRAM(S): 500 INJECTION, POWDER, FOR SOLUTION INTRAMUSCULAR; INTRAVENOUS at 15:09

## 2022-06-23 RX ADMIN — ONDANSETRON 4 MILLIGRAM(S): 8 TABLET, FILM COATED ORAL at 06:05

## 2022-06-23 RX ADMIN — Medication 100 MILLIGRAM(S): at 21:22

## 2022-06-23 RX ADMIN — Medication 1000 MILLIGRAM(S): at 21:22

## 2022-06-23 RX ADMIN — PANTOPRAZOLE SODIUM 40 MILLIGRAM(S): 20 TABLET, DELAYED RELEASE ORAL at 07:37

## 2022-06-23 RX ADMIN — Medication 1000 MILLIGRAM(S): at 16:00

## 2022-06-23 RX ADMIN — Medication 1000 MILLIGRAM(S): at 15:09

## 2022-06-23 RX ADMIN — ENOXAPARIN SODIUM 40 MILLIGRAM(S): 100 INJECTION SUBCUTANEOUS at 11:16

## 2022-06-23 RX ADMIN — FAMOTIDINE 20 MILLIGRAM(S): 10 INJECTION INTRAVENOUS at 17:35

## 2022-06-23 RX ADMIN — Medication 100 MILLIGRAM(S): at 06:05

## 2022-06-23 NOTE — PROGRESS NOTE ADULT - SUBJECTIVE AND OBJECTIVE BOX
POD #5 re-op bladder laceration repair  POD #15 TLH/BS    Pt stepped down from ICU 6/22 evening     Pt reports she has been having diarrhea x 1 day, states she has urge at this time when I examined her at bedside. Pt also reports some nausea. Pt states pain is well controlled. Kendall in place draining yellow concentrated urine, but adequate. Pt tolerating regular diet. Pt denies any CP, HA, SOB, vomiting, constipation, fever, chills, palpitations, or any other complaints.     Vital Signs Last 24 Hrs  T(F): 98.2 (06-23-22 @ 05:10), Max: 99.5 (06-22-22 @ 15:57)  HR: 92 (06-23-22 @ 05:10) (84 - 99)  BP: 119/69 (06-23-22 @ 05:10) (92/45 - 123/69)  RR: 18 (06-23-22 @ 05:10) (18 - 32)  SpO2: 95% (06-23-22 @ 05:10) (92% - 98%)    MEDICATIONS  (STANDING):  acetaminophen     Tablet .. 1000 milliGRAM(s) Oral every 8 hours  cefTRIAXone   IVPB      cefTRIAXone   IVPB 1000 milliGRAM(s) IV Intermittent every 24 hours  enoxaparin Injectable 40 milliGRAM(s) SubCutaneous every 24 hours  influenza   Vaccine 0.5 milliLiter(s) IntraMuscular once  metroNIDAZOLE  IVPB      metroNIDAZOLE  IVPB 500 milliGRAM(s) IV Intermittent every 8 hours  mupirocin 2% Ointment 1 Application(s) Both Nostrils two times a day  naloxone Injectable 0.4 milliGRAM(s) IV Push once  pantoprazole    Tablet 40 milliGRAM(s) Oral before breakfast    MEDICATIONS  (PRN):  aluminum hydroxide/magnesium hydroxide/simethicone Suspension 30 milliLiter(s) Oral every 6 hours PRN Dyspepsia  ondansetron Injectable 4 milliGRAM(s) IV Push every 6 hours PRN Nausea and/or Vomiting  oxyCODONE    IR 2.5 milliGRAM(s) Oral every 4 hours PRN Moderate Pain (4 - 6)  oxyCODONE    IR 5 milliGRAM(s) Oral every 4 hours PRN Severe Pain (7 - 10)  sodium chloride 0.9% lock flush 10 milliLiter(s) IV Push every 1 hour PRN Pre/post blood products, medications, blood draw, and to maintain line patency      PE  Gen: A&Ox3, NAD, appears comfortable  Abd: soft, nontender, nondistended; vertical incision C/D/I with staples in place; WALI drain still in place with minimal dark serosanguinous output  Pelvic: no vb; kendall catheter in place   Ext: soft, nontender                           9.4    17.59 )-----------( 386      ( 22 Jun 2022 09:28 )             27.1   06-22    139  |  105  |  11  ----------------------------<  90  3.7   |  22  |  0.44<L>    Ca    8.0<L>      22 Jun 2022 09:28  Phos  2.2     06-22  Mg     2.3     06-22    TPro  6.0  /  Alb  2.1<L>  /  TBili  0.6  /  DBili  x   /  AST  83<H>  /  ALT  46  /  AlkPhos  78  06-22      Historical Values  Culture - Blood (06.17.22 @ 18:54)   - Bacteroides fragilis: Detec   Gram Stain:   Growth in anaerobic bottle: Gram Negative Rods   Specimen Source: .Blood Blood   Organism: Blood Culture PCR   Culture Results:   Growth in anaerobic bottle: Bacteroides fragilis   "Susceptibilities not performed"   ***Blood Panel PCR results on this specimen are available   approximately 3 hours after the Gram stain result.***   Gram stain, PCR, and/or culture results may not always   correspond due to difference in methodologies.   ************************************************************   This PCR assay was performed by multiplex PCR. This   Assay tests for 66 bacterial and resistance gene targets.   Please refer to the [facility] test directory   at [email or web address] for details.   Organism Identification: Blood Culture PCR   Method Type: PCR   Culture - Blood (06.17.22 @ 18:45)   Specimen Source: .Blood Blood   Culture Results:   No Growth Final   Urine culture negative

## 2022-06-23 NOTE — PROGRESS NOTE ADULT - ASSESSMENT
A/p: POD #5 re-op bladder laceration repair  POD #15 TLH/BS  Pt stepped down from ICU 6/22 evening - pt was admitted for septic shock post operatively 2/2 urosepsis and hypovolemic shock s/p 3 units prbc and 2 units albumin; pt afebrile  -continue flagyl and ceftriaxone   -zofran/reglan prn nausea  -repeat blood cultures 6/24  -cont pain mgmt prn  -urology following   -venodynes and ambulation for dvt ppx  -case d/w Dr. Patterson

## 2022-06-23 NOTE — PROGRESS NOTE ADULT - SUBJECTIVE AND OBJECTIVE BOX
46y Female    Meds:  cefTRIAXone   IVPB      cefTRIAXone   IVPB 1000 milliGRAM(s) IV Intermittent every 24 hours  metroNIDAZOLE  IVPB      metroNIDAZOLE  IVPB 500 milliGRAM(s) IV Intermittent every 8 hours    Allergies    No Known Allergies    Intolerances        VITALS:  Vital Signs Last 24 Hrs  T(C): 36.8 (23 Jun 2022 05:10), Max: 37.5 (22 Jun 2022 15:57)  T(F): 98.2 (23 Jun 2022 05:10), Max: 99.5 (22 Jun 2022 15:57)  HR: 92 (23 Jun 2022 05:10) (84 - 99)  BP: 119/69 (23 Jun 2022 05:10) (92/45 - 122/69)  BP(mean): 80 (22 Jun 2022 18:00) (50 - 87)  RR: 18 (23 Jun 2022 05:10) (18 - 32)  SpO2: 95% (23 Jun 2022 05:10) (92% - 95%)    LABS/DIAGNOSTIC TESTS:                          9.1    19.36 )-----------( 443      ( 23 Jun 2022 10:09 )             25.7         06-23    138  |  106  |  9   ----------------------------<  103<H>  3.4<L>   |  22  |  0.46<L>    Ca    7.7<L>      23 Jun 2022 10:09  Phos  2.2     06-22  Mg     2.3     06-22    TPro  5.7<L>  /  Alb  2.0<L>  /  TBili  0.6  /  DBili  x   /  AST  68<H>  /  ALT  44  /  AlkPhos  75  06-23      LIVER FUNCTIONS - ( 23 Jun 2022 10:09 )  Alb: 2.0 g/dL / Pro: 5.7 g/dL / ALK PHOS: 75 U/L / ALT: 44 U/L DA / AST: 68 U/L / GGT: x             CULTURES: Catheterized Catheterized  06-18 @ 18:59   No growth  --  --      .Blood Blood  06-17 @ 18:54   Growth in anaerobic bottle: Bacteroides fragilis  "Susceptibilities not performed"  ***Blood Panel PCR results on this specimen are available  approximately 3 hours after the Gram stain result.***  Gram stain, PCR, and/or culture results may not always  correspond due to difference in methodologies.  ************************************************************  This PCR assay was performed by multiplex PCR. This  Assay tests for 66 bacterial and resistance gene targets.  Please refer to the [facility] Labs test directory  at [email or web address] for details.  --  Blood Culture PCR      .Blood Blood  06-17 @ 18:45   No Growth Final  --  --      Clean Catch Clean Catch (Midstream)  06-17 @ 18:41   No growth  --  --            RADIOLOGY:      ROS:  [  ] UNABLE TO ELICIT 46y Female who is doing much better overall, she still has some mild abdominal pain but has no fevers  or chills but has been having loose stools, no nausea , vomiting or other complaints. She has a kendall in place with clear urine in it.    Meds:  cefTRIAXone   IVPB      cefTRIAXone   IVPB 1000 milliGRAM(s) IV Intermittent every 24 hours  metroNIDAZOLE  IVPB      metroNIDAZOLE  IVPB 500 milliGRAM(s) IV Intermittent every 8 hours    Allergies    No Known Allergies    Intolerances        VITALS:  Vital Signs Last 24 Hrs  T(C): 36.8 (23 Jun 2022 05:10), Max: 37.5 (22 Jun 2022 15:57)  T(F): 98.2 (23 Jun 2022 05:10), Max: 99.5 (22 Jun 2022 15:57)  HR: 92 (23 Jun 2022 05:10) (84 - 99)  BP: 119/69 (23 Jun 2022 05:10) (92/45 - 122/69)  BP(mean): 80 (22 Jun 2022 18:00) (50 - 87)  RR: 18 (23 Jun 2022 05:10) (18 - 32)  SpO2: 95% (23 Jun 2022 05:10) (92% - 95%)    LABS/DIAGNOSTIC TESTS:                          9.1    19.36 )-----------( 443      ( 23 Jun 2022 10:09 )             25.7         06-23    138  |  106  |  9   ----------------------------<  103<H>  3.4<L>   |  22  |  0.46<L>    Ca    7.7<L>      23 Jun 2022 10:09  Phos  2.2     06-22  Mg     2.3     06-22    TPro  5.7<L>  /  Alb  2.0<L>  /  TBili  0.6  /  DBili  x   /  AST  68<H>  /  ALT  44  /  AlkPhos  75  06-23      LIVER FUNCTIONS - ( 23 Jun 2022 10:09 )  Alb: 2.0 g/dL / Pro: 5.7 g/dL / ALK PHOS: 75 U/L / ALT: 44 U/L DA / AST: 68 U/L / GGT: x             CULTURES: Catheterized Catheterized  06-18 @ 18:59   No growth  --  --      .Blood Blood  06-17 @ 18:54   Growth in anaerobic bottle: Bacteroides fragilis  "Susceptibilities not performed"  ***Blood Panel PCR results on this specimen are available  approximately 3 hours after the Gram stain result.***  Gram stain, PCR, and/or culture results may not always  correspond due to difference in methodologies.  ************************************************************  This PCR assay was performed by multiplex PCR. This  Assay tests for 66 bacterial and resistance gene targets.  Please refer to the [facility] Labs test directory  at [email or web address] for details.  --  Blood Culture PCR      .Blood Blood  06-17 @ 18:45   No Growth Final  --  --      Clean Catch Clean Catch (Midstream)  06-17 @ 18:41   No growth  --  --            RADIOLOGY:      ROS:  [  ] UNABLE TO ELICIT

## 2022-06-23 NOTE — PROGRESS NOTE ADULT - ASSESSMENT
47 y/o Female s/p Exploratory Laparotomy, Complex repair of laceration of bladder 6/18; Septic shock 2/2 Urosepsis, Hypovolemic shock secondary to blood loss, s/p 2U PRBCs      - continue kendall catheter at least 2 weeks, cystogram prior to kendall catheter removal   - Plan to keep drain until discharge   - continue diet   - continue abx per ID

## 2022-06-23 NOTE — PROGRESS NOTE ADULT - ASSESSMENT
Sepsis - resolved  Bacteremia - with B Fragilis  Fevers - resolved  Leukocytosis     Plan - Cont Rocephin 1 gm iv q24hrs ( considering she had a bladder perforation , she should be covered for gram negative organisms also)  Cont Flagyl 500mgs iv q8hrs  repeat blood cultures if negative will plan to DC home on   Ceftin 500mgs po bid and Flagyl 500mgs po TID both to complete a total of 14 days of antibiotics.

## 2022-06-23 NOTE — PROGRESS NOTE ADULT - SUBJECTIVE AND OBJECTIVE BOX
Interval events:   transferred to floor       OBJECTIVE:  Vital Signs Last 24 Hrs  T(C): 36.8 (23 Jun 2022 05:10), Max: 37.5 (22 Jun 2022 15:57)  T(F): 98.2 (23 Jun 2022 05:10), Max: 99.5 (22 Jun 2022 15:57)  HR: 92 (23 Jun 2022 05:10) (84 - 99)  BP: 119/69 (23 Jun 2022 05:10) (92/45 - 123/69)  BP(mean): 80 (22 Jun 2022 18:00) (50 - 87)  RR: 18 (23 Jun 2022 05:10) (18 - 32)  SpO2: 95% (23 Jun 2022 05:10) (92% - 98%)    Physical Examination:  GEN: NAD, resting quietly  PULM: symmetric chest rise bilaterally, no increased WOB  ABD: soft, nontender, nondistended, incision CDI  : kendall secured, urine clear       LABS:                        9.4    17.59 )-----------( 386      ( 22 Jun 2022 09:28 )             27.1       06-22    139  |  105  |  11  ----------------------------<  90  3.7   |  22  |  0.44<L>    Ca    8.0<L>      22 Jun 2022 09:28  Phos  2.2     06-22  Mg     2.3     06-22    TPro  6.0  /  Alb  2.1<L>  /  TBili  0.6  /  DBili  x   /  AST  83<H>  /  ALT  46  /  AlkPhos  78  06-22

## 2022-06-24 LAB
ALBUMIN SERPL ELPH-MCNC: 2 G/DL — LOW (ref 3.5–5)
ALP SERPL-CCNC: 93 U/L — SIGNIFICANT CHANGE UP (ref 40–120)
ALT FLD-CCNC: 72 U/L DA — HIGH (ref 10–60)
ANION GAP SERPL CALC-SCNC: 9 MMOL/L — SIGNIFICANT CHANGE UP (ref 5–17)
ANISOCYTOSIS BLD QL: SLIGHT — SIGNIFICANT CHANGE UP
AST SERPL-CCNC: 146 U/L — HIGH (ref 10–40)
BASOPHILS # BLD AUTO: 0 K/UL — SIGNIFICANT CHANGE UP (ref 0–0.2)
BASOPHILS NFR BLD AUTO: 0 % — SIGNIFICANT CHANGE UP (ref 0–2)
BILIRUB SERPL-MCNC: 0.7 MG/DL — SIGNIFICANT CHANGE UP (ref 0.2–1.2)
BUN SERPL-MCNC: 7 MG/DL — SIGNIFICANT CHANGE UP (ref 7–18)
C DIFF BY PCR RESULT: SIGNIFICANT CHANGE UP
C DIFF TOX GENS STL QL NAA+PROBE: SIGNIFICANT CHANGE UP
CALCIUM SERPL-MCNC: 7.6 MG/DL — LOW (ref 8.4–10.5)
CHLORIDE SERPL-SCNC: 105 MMOL/L — SIGNIFICANT CHANGE UP (ref 96–108)
CO2 SERPL-SCNC: 24 MMOL/L — SIGNIFICANT CHANGE UP (ref 22–31)
CREAT SERPL-MCNC: 0.46 MG/DL — LOW (ref 0.5–1.3)
EGFR: 119 ML/MIN/1.73M2 — SIGNIFICANT CHANGE UP
EOSINOPHIL # BLD AUTO: 0.17 K/UL — SIGNIFICANT CHANGE UP (ref 0–0.5)
EOSINOPHIL NFR BLD AUTO: 1 % — SIGNIFICANT CHANGE UP (ref 0–6)
GLUCOSE SERPL-MCNC: 92 MG/DL — SIGNIFICANT CHANGE UP (ref 70–99)
HCT VFR BLD CALC: 28.1 % — LOW (ref 34.5–45)
HGB BLD-MCNC: 9.4 G/DL — LOW (ref 11.5–15.5)
HYPOCHROMIA BLD QL: SLIGHT — SIGNIFICANT CHANGE UP
LYMPHOCYTES # BLD AUTO: 1.82 K/UL — SIGNIFICANT CHANGE UP (ref 1–3.3)
LYMPHOCYTES # BLD AUTO: 11 % — LOW (ref 13–44)
MACROCYTES BLD QL: SLIGHT — SIGNIFICANT CHANGE UP
MANUAL SMEAR VERIFICATION: SIGNIFICANT CHANGE UP
MCHC RBC-ENTMCNC: 28.9 PG — SIGNIFICANT CHANGE UP (ref 27–34)
MCHC RBC-ENTMCNC: 33.5 GM/DL — SIGNIFICANT CHANGE UP (ref 32–36)
MCV RBC AUTO: 86.5 FL — SIGNIFICANT CHANGE UP (ref 80–100)
METAMYELOCYTES # FLD: 2 % — HIGH (ref 0–0)
MICROCYTES BLD QL: SLIGHT — SIGNIFICANT CHANGE UP
MONOCYTES # BLD AUTO: 0.83 K/UL — SIGNIFICANT CHANGE UP (ref 0–0.9)
MONOCYTES NFR BLD AUTO: 5 % — SIGNIFICANT CHANGE UP (ref 2–14)
MYELOCYTES NFR BLD: 1 % — HIGH (ref 0–0)
NEUTROPHILS # BLD AUTO: 13.26 K/UL — HIGH (ref 1.8–7.4)
NEUTROPHILS NFR BLD AUTO: 77 % — SIGNIFICANT CHANGE UP (ref 43–77)
NEUTS BAND # BLD: 3 % — SIGNIFICANT CHANGE UP (ref 0–8)
NRBC # BLD: 0 /100 — SIGNIFICANT CHANGE UP (ref 0–0)
OVALOCYTES BLD QL SMEAR: SLIGHT — SIGNIFICANT CHANGE UP
PLAT MORPH BLD: NORMAL — SIGNIFICANT CHANGE UP
PLATELET # BLD AUTO: 527 K/UL — HIGH (ref 150–400)
PLATELET COUNT - ESTIMATE: ABNORMAL
POIKILOCYTOSIS BLD QL AUTO: SLIGHT — SIGNIFICANT CHANGE UP
POLYCHROMASIA BLD QL SMEAR: SLIGHT — SIGNIFICANT CHANGE UP
POTASSIUM SERPL-MCNC: 3.3 MMOL/L — LOW (ref 3.5–5.3)
POTASSIUM SERPL-SCNC: 3.3 MMOL/L — LOW (ref 3.5–5.3)
PROT SERPL-MCNC: 5.7 G/DL — LOW (ref 6–8.3)
RBC # BLD: 3.25 M/UL — LOW (ref 3.8–5.2)
RBC # FLD: 15.9 % — HIGH (ref 10.3–14.5)
RBC BLD AUTO: ABNORMAL
SARS-COV-2 RNA SPEC QL NAA+PROBE: SIGNIFICANT CHANGE UP
SODIUM SERPL-SCNC: 138 MMOL/L — SIGNIFICANT CHANGE UP (ref 135–145)
WBC # BLD: 16.57 K/UL — HIGH (ref 3.8–10.5)
WBC # FLD AUTO: 16.57 K/UL — HIGH (ref 3.8–10.5)

## 2022-06-24 PROCEDURE — 71045 X-RAY EXAM CHEST 1 VIEW: CPT | Mod: 26

## 2022-06-24 RX ADMIN — Medication 100 MILLIGRAM(S): at 21:57

## 2022-06-24 RX ADMIN — CEFTRIAXONE 100 MILLIGRAM(S): 500 INJECTION, POWDER, FOR SOLUTION INTRAMUSCULAR; INTRAVENOUS at 18:56

## 2022-06-24 RX ADMIN — FAMOTIDINE 20 MILLIGRAM(S): 10 INJECTION INTRAVENOUS at 17:16

## 2022-06-24 RX ADMIN — Medication 100 MILLIGRAM(S): at 05:37

## 2022-06-24 RX ADMIN — PANTOPRAZOLE SODIUM 40 MILLIGRAM(S): 20 TABLET, DELAYED RELEASE ORAL at 05:37

## 2022-06-24 RX ADMIN — Medication 100 MILLIGRAM(S): at 16:10

## 2022-06-24 RX ADMIN — FAMOTIDINE 20 MILLIGRAM(S): 10 INJECTION INTRAVENOUS at 05:37

## 2022-06-24 RX ADMIN — MUPIROCIN 1 APPLICATION(S): 20 OINTMENT TOPICAL at 19:05

## 2022-06-24 RX ADMIN — MUPIROCIN 1 APPLICATION(S): 20 OINTMENT TOPICAL at 05:38

## 2022-06-24 RX ADMIN — ENOXAPARIN SODIUM 40 MILLIGRAM(S): 100 INJECTION SUBCUTANEOUS at 11:26

## 2022-06-24 NOTE — PROGRESS NOTE ADULT - PROBLEM SELECTOR PLAN 1
-continue flagyl and ceftriaxone   -ID following, can discharge on Augmentin 875 BID for total 14 days of antibiotics   -zofran/reglan prn nausea  -repeat blood cultures 6/24  -cont pain mgmt prn  -urology following, possible drain removal   -venodynes and ambulation for dvt ppx  -case d/w Dr. Patterson -continue flagyl and ceftriaxone   -ID following, can discharge on Augmentin 875 BID for total 14 days of antibiotics   -zofran/reglan prn nausea  -repeat blood cultures 6/24  -observe vaginal bleeding, pad checks   -cont pain mgmt prn  -urology following, possible drain removal   -venodynes and ambulation for dvt ppx  -case d/w Dr. Patterson

## 2022-06-24 NOTE — PROGRESS NOTE ADULT - ASSESSMENT
A/P: POD #6 re-op bladder laceration repair  POD #16 TLH/BS  Pt stepped down from ICU 6/22 evening - pt was admitted for septic shock post operatively 2/2 urosepsis and hypovolemic shock s/p 3 units prbc and 2 units albumin; pt afebrile

## 2022-06-24 NOTE — CHART NOTE - NSCHARTNOTEFT_GEN_A_CORE
Called to eval pt for increasing hematuria. Pt was noted to have light pink UO this afternoon that has progressed to bright red urine. Pt and nurse notes blood when wiping on toilet as well. Pt resting comfortably in bed, UO at proximal kendall tubing fruit punch. No suprapubic discomfort or distention noted. Vitals stable. Attending made aware. No adjustment to kendall at present time. con't to monitor UO.

## 2022-06-24 NOTE — PROGRESS NOTE ADULT - SUBJECTIVE AND OBJECTIVE BOX
Patient seen at bedisde resting comfortably. pt states she is having red vaginal bleeding, seen only when she wipes. + Ambulation, voiding without difficulty, + flatus; tolerating regular diet. Denies HA, CP, SOB, N/V/D,  no bm; dizziness, palpitations, worsening abdominal pain, worsening vaginal bleeding, or any other concerns.     Vital Signs Last 24 Hrs  T(C): 37.1 (24 Jun 2022 05:32), Max: 37.9 (23 Jun 2022 20:59)  T(F): 98.8 (24 Jun 2022 05:32), Max: 100.2 (23 Jun 2022 20:59)  HR: 96 (24 Jun 2022 05:32) (86 - 108)  BP: 121/71 (24 Jun 2022 05:32) (113/73 - 122/69)  BP(mean): 80 (24 Jun 2022 05:32) (80 - 83)  RR: 18 (24 Jun 2022 05:32) (18 - 18)  SpO2: 97% (24 Jun 2022 05:32) (93% - 97%)    Gen: A&O x 3, NAD  Chest: CTA B/L  Cardiac: S1,S2  RRR  Abdomen: +BS; soft; Nontender, nondistended, incision c/d/i, staples in place  WALI drain in place with 10cc dark serosanguinous fluid   Gyn: no vaginal bleeding   Extremities: Nontender, no worsening edema                          9.4    16.57 )-----------( 527      ( 24 Jun 2022 06:46 )             28.1     06-24    138  |  105  |  7   ----------------------------<  92  3.3<L>   |  24  |  0.46<L>    Ca    7.6<L>      24 Jun 2022 06:46    TPro  5.7<L>  /  Alb  2.0<L>  /  TBili  0.7  /  DBili  x   /  AST  146<H>  /  ALT  72<H>  /  AlkPhos  93  06-24    A/P: POD #6 re-op bladder laceration repair  POD #16 TLH/BS  Pt stepped down from ICU 6/22 evening - pt was admitted for septic shock post operatively 2/2 urosepsis and hypovolemic shock s/p 3 units prbc and 2 units albumin; pt afebrile  -continue flagyl and ceftriaxone   -ID following, can discharge on Augmentin 875 BID for total 14 days of antibiotics   -zofran/reglan prn nausea  -repeat blood cultures 6/24  -cont pain mgmt prn  -urology following, possible drain removal   -venodynes and ambulation for dvt ppx  -case d/w Dr. Patterson    Patient seen at bedisde resting comfortably. pt states she is having red vaginal bleeding, seen only when she wipes. + Ambulation, voiding without difficulty, + flatus; tolerating regular diet. Denies HA, CP, SOB, N/V/D,  no bm; dizziness, palpitations, worsening abdominal pain, worsening vaginal bleeding, or any other concerns.     Vital Signs Last 24 Hrs  T(C): 37.1 (24 Jun 2022 05:32), Max: 37.9 (23 Jun 2022 20:59)  T(F): 98.8 (24 Jun 2022 05:32), Max: 100.2 (23 Jun 2022 20:59)  HR: 96 (24 Jun 2022 05:32) (86 - 108)  BP: 121/71 (24 Jun 2022 05:32) (113/73 - 122/69)  BP(mean): 80 (24 Jun 2022 05:32) (80 - 83)  RR: 18 (24 Jun 2022 05:32) (18 - 18)  SpO2: 97% (24 Jun 2022 05:32) (93% - 97%)    Gen: A&O x 3, NAD  Chest: CTA B/L  Cardiac: S1,S2  RRR  Abdomen: +BS; soft; Nontender, nondistended, incision c/d/i, staples in place  WALI drain in place with 10cc dark serosanguinous fluid   GYN: speculum: dark old blood noted in vaginal vault, no active bleeding from sutures in vaginal cuff   Extremities: Nontender, no worsening edema                          9.4    16.57 )-----------( 527      ( 24 Jun 2022 06:46 )             28.1     06-24    138  |  105  |  7   ----------------------------<  92  3.3<L>   |  24  |  0.46<L>    Ca    7.6<L>      24 Jun 2022 06:46    TPro  5.7<L>  /  Alb  2.0<L>  /  TBili  0.7  /  DBili  x   /  AST  146<H>  /  ALT  72<H>  /  AlkPhos  93  06-24    A/P: POD #6 re-op bladder laceration repair  POD #16 TLH/BS  Pt stepped down from ICU 6/22 evening - pt was admitted for septic shock post operatively 2/2 urosepsis and hypovolemic shock s/p 3 units prbc and 2 units albumin; pt afebrile  -continue flagyl and ceftriaxone   -ID following, can discharge on Augmentin 875 BID for total 14 days of antibiotics   -zofran/reglan prn nausea  -repeat blood cultures 6/24  -observe vaginal bleeding, pad checks   -cont pain mgmt prn  -urology following, possible drain removal   -venodynes and ambulation for dvt ppx  -case d/w Dr. Patterson

## 2022-06-24 NOTE — CHART NOTE - NSCHARTNOTEFT_GEN_A_CORE
GYN PA Focused Note    Patient seen and reevaluated at bedside, appears well. States the blood in her urine has increased. Also reports vaginal bleeding, not increased from earlier.  Vitals stable  gyn minimal dark red blood on pad, no active bleeding noted  reviewed findings with Dr. Patterson, who assessed with patient earlier had vaginal bleeding and blood tinged urine, instructed to urology with assess kendall  spoke with YAMILEX Arita from surgery, states will evaluate patient  patient was reassured and advised of plan  dw Dr. Patterson

## 2022-06-24 NOTE — PROGRESS NOTE ADULT - PROBLEM SELECTOR PROBLEM 1
Perforation of bladder during operative procedure
Postoperative abscess

## 2022-06-25 ENCOUNTER — TRANSCRIPTION ENCOUNTER (OUTPATIENT)
Age: 47
End: 2022-06-25

## 2022-06-25 VITALS — OXYGEN SATURATION: 98 % | HEART RATE: 98 BPM

## 2022-06-25 LAB
ANION GAP SERPL CALC-SCNC: 10 MMOL/L — SIGNIFICANT CHANGE UP (ref 5–17)
BASOPHILS # BLD AUTO: 0.05 K/UL — SIGNIFICANT CHANGE UP (ref 0–0.2)
BASOPHILS NFR BLD AUTO: 0.4 % — SIGNIFICANT CHANGE UP (ref 0–2)
BUN SERPL-MCNC: 6 MG/DL — LOW (ref 7–18)
CALCIUM SERPL-MCNC: 7.5 MG/DL — LOW (ref 8.4–10.5)
CHLORIDE SERPL-SCNC: 105 MMOL/L — SIGNIFICANT CHANGE UP (ref 96–108)
CO2 SERPL-SCNC: 24 MMOL/L — SIGNIFICANT CHANGE UP (ref 22–31)
CREAT SERPL-MCNC: 0.48 MG/DL — LOW (ref 0.5–1.3)
CULTURE RESULTS: SIGNIFICANT CHANGE UP
EGFR: 117 ML/MIN/1.73M2 — SIGNIFICANT CHANGE UP
EOSINOPHIL # BLD AUTO: 0 K/UL — SIGNIFICANT CHANGE UP (ref 0–0.5)
EOSINOPHIL NFR BLD AUTO: 0 % — SIGNIFICANT CHANGE UP (ref 0–6)
GLUCOSE SERPL-MCNC: 107 MG/DL — HIGH (ref 70–99)
HCT VFR BLD CALC: 27.3 % — LOW (ref 34.5–45)
HGB BLD-MCNC: 9 G/DL — LOW (ref 11.5–15.5)
IMM GRANULOCYTES NFR BLD AUTO: 5.4 % — HIGH (ref 0–1.5)
LYMPHOCYTES # BLD AUTO: 1.57 K/UL — SIGNIFICANT CHANGE UP (ref 1–3.3)
LYMPHOCYTES # BLD AUTO: 11.7 % — LOW (ref 13–44)
MCHC RBC-ENTMCNC: 29.3 PG — SIGNIFICANT CHANGE UP (ref 27–34)
MCHC RBC-ENTMCNC: 33 GM/DL — SIGNIFICANT CHANGE UP (ref 32–36)
MCV RBC AUTO: 88.9 FL — SIGNIFICANT CHANGE UP (ref 80–100)
MONOCYTES # BLD AUTO: 1.18 K/UL — HIGH (ref 0–0.9)
MONOCYTES NFR BLD AUTO: 8.8 % — SIGNIFICANT CHANGE UP (ref 2–14)
NEUTROPHILS # BLD AUTO: 9.91 K/UL — HIGH (ref 1.8–7.4)
NEUTROPHILS NFR BLD AUTO: 73.7 % — SIGNIFICANT CHANGE UP (ref 43–77)
NRBC # BLD: 0 /100 WBCS — SIGNIFICANT CHANGE UP (ref 0–0)
PLATELET # BLD AUTO: 545 K/UL — HIGH (ref 150–400)
POTASSIUM SERPL-MCNC: 3.3 MMOL/L — LOW (ref 3.5–5.3)
POTASSIUM SERPL-SCNC: 3.3 MMOL/L — LOW (ref 3.5–5.3)
RBC # BLD: 3.07 M/UL — LOW (ref 3.8–5.2)
RBC # FLD: 16.4 % — HIGH (ref 10.3–14.5)
SODIUM SERPL-SCNC: 139 MMOL/L — SIGNIFICANT CHANGE UP (ref 135–145)
SPECIMEN SOURCE: SIGNIFICANT CHANGE UP
WBC # BLD: 13.43 K/UL — HIGH (ref 3.8–10.5)
WBC # FLD AUTO: 13.43 K/UL — HIGH (ref 3.8–10.5)

## 2022-06-25 PROCEDURE — 82803 BLOOD GASES ANY COMBINATION: CPT

## 2022-06-25 PROCEDURE — 99285 EMERGENCY DEPT VISIT HI MDM: CPT

## 2022-06-25 PROCEDURE — 74177 CT ABD & PELVIS W/CONTRAST: CPT | Mod: MA

## 2022-06-25 PROCEDURE — 81001 URINALYSIS AUTO W/SCOPE: CPT

## 2022-06-25 PROCEDURE — 84100 ASSAY OF PHOSPHORUS: CPT

## 2022-06-25 PROCEDURE — 86900 BLOOD TYPING SEROLOGIC ABO: CPT

## 2022-06-25 PROCEDURE — 71045 X-RAY EXAM CHEST 1 VIEW: CPT

## 2022-06-25 PROCEDURE — 87493 C DIFF AMPLIFIED PROBE: CPT

## 2022-06-25 PROCEDURE — 36430 TRANSFUSION BLD/BLD COMPNT: CPT

## 2022-06-25 PROCEDURE — 87046 STOOL CULTR AEROBIC BACT EA: CPT

## 2022-06-25 PROCEDURE — 80048 BASIC METABOLIC PNL TOTAL CA: CPT

## 2022-06-25 PROCEDURE — 36415 COLL VENOUS BLD VENIPUNCTURE: CPT

## 2022-06-25 PROCEDURE — 74176 CT ABD & PELVIS W/O CONTRAST: CPT

## 2022-06-25 PROCEDURE — 80053 COMPREHEN METABOLIC PANEL: CPT

## 2022-06-25 PROCEDURE — 97116 GAIT TRAINING THERAPY: CPT

## 2022-06-25 PROCEDURE — P9040: CPT

## 2022-06-25 PROCEDURE — 86901 BLOOD TYPING SEROLOGIC RH(D): CPT

## 2022-06-25 PROCEDURE — 87150 DNA/RNA AMPLIFIED PROBE: CPT

## 2022-06-25 PROCEDURE — 85027 COMPLETE CBC AUTOMATED: CPT

## 2022-06-25 PROCEDURE — 87077 CULTURE AEROBIC IDENTIFY: CPT

## 2022-06-25 PROCEDURE — 93306 TTE W/DOPPLER COMPLETE: CPT

## 2022-06-25 PROCEDURE — 87045 FECES CULTURE AEROBIC BACT: CPT

## 2022-06-25 PROCEDURE — 86923 COMPATIBILITY TEST ELECTRIC: CPT

## 2022-06-25 PROCEDURE — 96375 TX/PRO/DX INJ NEW DRUG ADDON: CPT

## 2022-06-25 PROCEDURE — 97110 THERAPEUTIC EXERCISES: CPT

## 2022-06-25 PROCEDURE — 83690 ASSAY OF LIPASE: CPT

## 2022-06-25 PROCEDURE — 87635 SARS-COV-2 COVID-19 AMP PRB: CPT

## 2022-06-25 PROCEDURE — 74174 CTA ABD&PLVS W/CONTRAST: CPT

## 2022-06-25 PROCEDURE — 80076 HEPATIC FUNCTION PANEL: CPT

## 2022-06-25 PROCEDURE — 83605 ASSAY OF LACTIC ACID: CPT

## 2022-06-25 PROCEDURE — 74178 CT ABD&PLV WO CNTR FLWD CNTR: CPT

## 2022-06-25 PROCEDURE — 85025 COMPLETE CBC W/AUTO DIFF WBC: CPT

## 2022-06-25 PROCEDURE — 86850 RBC ANTIBODY SCREEN: CPT

## 2022-06-25 PROCEDURE — 87040 BLOOD CULTURE FOR BACTERIA: CPT

## 2022-06-25 PROCEDURE — 84702 CHORIONIC GONADOTROPIN TEST: CPT

## 2022-06-25 PROCEDURE — 96374 THER/PROPH/DIAG INJ IV PUSH: CPT

## 2022-06-25 PROCEDURE — 87086 URINE CULTURE/COLONY COUNT: CPT

## 2022-06-25 PROCEDURE — 87641 MR-STAPH DNA AMP PROBE: CPT

## 2022-06-25 PROCEDURE — P9047: CPT

## 2022-06-25 PROCEDURE — 93005 ELECTROCARDIOGRAM TRACING: CPT

## 2022-06-25 PROCEDURE — 83735 ASSAY OF MAGNESIUM: CPT

## 2022-06-25 PROCEDURE — 71275 CT ANGIOGRAPHY CHEST: CPT

## 2022-06-25 PROCEDURE — C1889: CPT

## 2022-06-25 PROCEDURE — C1758: CPT

## 2022-06-25 PROCEDURE — 87640 STAPH A DNA AMP PROBE: CPT

## 2022-06-25 RX ORDER — CEFUROXIME AXETIL 250 MG
1 TABLET ORAL
Qty: 14 | Refills: 0
Start: 2022-06-25 | End: 2022-07-01

## 2022-06-25 RX ORDER — IBUPROFEN 200 MG
1 TABLET ORAL
Qty: 120 | Refills: 0
Start: 2022-06-25 | End: 2022-07-24

## 2022-06-25 RX ORDER — METRONIDAZOLE 500 MG
1 TABLET ORAL
Qty: 21 | Refills: 0
Start: 2022-06-25 | End: 2022-07-01

## 2022-06-25 RX ORDER — DOCUSATE SODIUM 100 MG
1 CAPSULE ORAL
Qty: 60 | Refills: 0
Start: 2022-06-25 | End: 2022-07-24

## 2022-06-25 RX ORDER — POTASSIUM CHLORIDE 20 MEQ
40 PACKET (EA) ORAL ONCE
Refills: 0 | Status: DISCONTINUED | OUTPATIENT
Start: 2022-06-25 | End: 2022-06-25

## 2022-06-25 RX ADMIN — FAMOTIDINE 20 MILLIGRAM(S): 10 INJECTION INTRAVENOUS at 06:23

## 2022-06-25 RX ADMIN — MUPIROCIN 1 APPLICATION(S): 20 OINTMENT TOPICAL at 06:23

## 2022-06-25 RX ADMIN — Medication 100 MILLIGRAM(S): at 14:36

## 2022-06-25 RX ADMIN — PANTOPRAZOLE SODIUM 40 MILLIGRAM(S): 20 TABLET, DELAYED RELEASE ORAL at 06:23

## 2022-06-25 RX ADMIN — Medication 100 MILLIGRAM(S): at 06:23

## 2022-06-25 NOTE — DISCHARGE NOTE PROVIDER - NSDCCPTREATMENT_GEN_ALL_CORE_FT
PRINCIPAL PROCEDURE  Procedure: Complex repair of laceration of bladder  Findings and Treatment: Take oral antibiotics as directed for 7 days. Nothing in vagina, no sex no tampons.  No heavy lifting,  no pushing,  ambulation daily as tolerated. Shower daily, clean wound well and keep dry after. Please maintain kendall for 2 weeks and follow up with Urology in 2 weeks. Please follow up with Dr. Patterson on Friday.      SECONDARY PROCEDURE  Procedure: Insertion of intravenous catheter in adult patient  Findings and Treatment: 4 Vietnamese 20cm Midlines Catheter placed with sterile technique under ultrasound guidence by healthcare provider for purpose of: antibiotic adminstration.

## 2022-06-25 NOTE — DISCHARGE NOTE PROVIDER - REASON FOR ADMISSION
Pt admitted with fever s/p hysterectomy 6/8/2022  pelvic abscess and fever s/p hysterectomy 6/8/2022

## 2022-06-25 NOTE — CHART NOTE - NSCHARTNOTESELECT_GEN_ALL_CORE
Event Note
Urology/Event Note
Event Note
Line Removal/Event Note
Transfer Note

## 2022-06-25 NOTE — PROGRESS NOTE ADULT - ASSESSMENT
45 y/o Female s/p Exploratory Laparotomy, Complex repair of laceration of bladder 6/18; Septic shock 2/2 Urosepsis, Hypovolemic shock secondary to blood loss, s/p 2U PRBCs      - continue kendall catheter at least 2 weeks, cystogram prior to kendall catheter removal   - continue diet   - continue abx per ID

## 2022-06-25 NOTE — DISCHARGE NOTE PROVIDER - HOSPITAL COURSE
Pt was admitted with fever on POD#9 s/p hysterectomy 6/8/2022. CT showed suspected pelvic abscess and was started on antibiotics. Pt had difficulty voiding and had kendall placed with relief. CT A/P urogram showed bladder perforation with peritoneal extravasation. Urology consulted. Pt taken to OR on 6/18/22 for repair of perforation with abdominal washout (6/18). Pt was found to have  hypotension and acute blood loss anemia and was admitted to ICU for worsening septic shock requiring vasopressor support.  Pt found with Hb drop from Hb 12 to 6.8 requiring total 3u PRBC. Pt also found with hypoxia and b/l pleural effusions, given Lasix 20iv qd x2. Pt condition improved. Pt found with B. fragilis bacteremia started on flagyl q8. TTE with normal EF and diastolic function. Pt was stepped down from ICU 6/21/22. CTA chest, abdomen pelvis did not show pe or revisualization of ovarian vein thrombosis. Pt with GIOVANI, as creatine was 1.4 downtrended to 0.4. ID Dr. Maldonado consulted, Pt continued on ceftriaxone / flagyl. repeat blood cultures were prelim negative. Pt deemed stable for discharge home on Ceftin/Flagyl and for kendall with leg bag for 14 days. Urology to perform cystogram prior to kendall catheter removal in office. prescriptions sent electronically to patient's pharmacy.

## 2022-06-25 NOTE — DISCHARGE NOTE PROVIDER - NSDCCPCAREPLAN_GEN_ALL_CORE_FT
PRINCIPAL DISCHARGE DIAGNOSIS  Diagnosis: Sepsis  Assessment and Plan of Treatment: Take oral antibiotics as directed for 7 days. Nothing in vagina, no sex no tampons.  No heavy lifting,  no pushing,  ambulation daily as tolerated. Shower daily, clean wound well and keep dry after. Please maintain kendall for 2 weeks and follow up with Urology in 2 weeks. Please follow up with Dr. Patterson on Friday.      SECONDARY DISCHARGE DIAGNOSES  Diagnosis: Perforation of bladder during operative procedure  Assessment and Plan of Treatment: Take oral antibiotics as directed for 7 days. Nothing in vagina, no sex no tampons.  No heavy lifting,  no pushing,  ambulation daily as tolerated. Shower daily, clean wound well and keep dry after. Please maintain kendall for 2 weeks and follow up with Urology in 2 weeks. Please follow up with Dr. Patterson on Friday.    Diagnosis: Septic shock  Assessment and Plan of Treatment: Take oral antibiotics as directed for 7 days. Nothing in vagina, no sex no tampons.  No heavy lifting,  no pushing,  ambulation daily as tolerated. Shower daily, clean wound well and keep dry after. Please maintain kendall for 2 weeks and follow up with Urology in 2 weeks. Please follow up with Dr. Patterson on Friday.    Diagnosis: GIOVANI (acute kidney injury)  Assessment and Plan of Treatment:     Diagnosis: Sepsis  Assessment and Plan of Treatment:

## 2022-06-25 NOTE — DISCHARGE NOTE PROVIDER - NSDCCPGOAL_GEN_ALL_CORE_FT
To get better and follow your care plan as instructed.  Take oral antibiotics as directed for 7 days. Nothing in vagina, no sex no tampons.  No heavy lifting,  no pushing,  ambulation daily as tolerated. Shower daily, clean wound well and keep dry after. Please maintain kendall for 2 weeks and follow up with Urology in 2 weeks. Please follow up with Dr. Patterson on Friday.

## 2022-06-25 NOTE — DISCHARGE NOTE NURSING/CASE MANAGEMENT/SOCIAL WORK - PATIENT PORTAL LINK FT
You can access the FollowMyHealth Patient Portal offered by [facility] by registering at the following website: [email or web address]. By joining [facility]’s FollowMyHealth portal, you will also be able to view your health information using other applications (apps) compatible with our system.

## 2022-06-25 NOTE — DISCHARGE NOTE PROVIDER - NSDCMRMEDTOKEN_GEN_ALL_CORE_FT
cefuroxime 500 mg oral tablet: 1 tab(s) orally every 12 hours   Colace 100 mg oral capsule: 1 cap(s) orally 2 times a day  ibuprofen 600 mg oral tablet: 1 tab(s) orally every 6 hours  metroNIDAZOLE 500 mg oral tablet: 1 tab(s) orally 3 times a day

## 2022-06-25 NOTE — DISCHARGE NOTE NURSING/CASE MANAGEMENT/SOCIAL WORK - NSDCPEFALRISK_GEN_ALL_CORE
For information on Fall & Injury Prevention, visit: [email or web address] OR  [email or web address] OR  https://www.cdc.gov/steadi/patient.html

## 2022-06-25 NOTE — DISCHARGE NOTE PROVIDER - CARE PROVIDER_API CALL
Windy Patterson; MS)  OBGYN at [address], [place] [address]  Phone: [phone]  Fax: [phone]  Scheduled Appointment: 07/01/2022    Raul Mitchell)  Urology  [address]  [place] [id number]  Phone: [phone]  Fax: [phone]  Follow Up Time: 2 weeks

## 2022-06-25 NOTE — PROGRESS NOTE ADULT - PROVIDER SPECIALTY LIST ADULT
Critical Care
Urology
Urology
Critical Care
Infectious Disease
OB
Urology
Critical Care
Critical Care
GYN
Urology
GYN
OB

## 2022-06-25 NOTE — PROGRESS NOTE ADULT - ASSESSMENT
s/p bladder repair Day 7   s/p TLH BS 06/08  s/p 3 PRBCs   s/p Bertin drain and central catheter       Possible DC today with oral antibiotic

## 2022-06-25 NOTE — PROGRESS NOTE ADULT - REASON FOR ADMISSION
pelvic abscess and fever s/p hysterectomy 6/8/2022
pelvic abscess and fever s/p hysterectomy 6/8/2022  urosepsis s/p bladder repair
pelvic abscess and fever s/p hysterectomy 6/8/2022
pelvic abscess and fever s/p hysterectomy 6/8/2022  uroseptis s/p exp lap bladder repair 6/18/2022
pelvic abscess and fever s/p hysterectomy 6/8/2022

## 2022-06-25 NOTE — DISCHARGE NOTE PROVIDER - PROVIDER TOKENS
PROVIDER:[TOKEN:[[id number]],SCHEDULEDAPPT:[07/01/2022]],PROVIDER:[TOKEN:[[id number]],FOLLOWUP:[2 weeks]]

## 2022-06-25 NOTE — CHART NOTE - NSCHARTNOTEFT_GEN_A_CORE
EDISON KAMINSKI FOLLOW UP NOTE     repeat Blood culture prelim negative   Urology cleared for discharge with kendall catheter to remain for 2 weeks   Infectious disease cleared for discharge on oral antibiotics   Pt seen at bedside by Dr. Patterosn  krupa removed and steri strips placed   Pt cleared for discharge home today, pt agrees with plan   Pt to follow up in office with Dr. Patterson on Friday and Dr. Mitchell in 2 weeks

## 2022-06-25 NOTE — PROGRESS NOTE ADULT - SUBJECTIVE AND OBJECTIVE BOX
INTERVAL HPI/OVERNIGHT EVENTS:    Pt seen and examined at bedside. Offers no acute complaints at this time.     Vital Signs Last 24 Hrs  T(C): 37.7 (25 Jun 2022 05:59), Max: 37.7 (25 Jun 2022 05:59)  T(F): 99.8 (25 Jun 2022 05:59), Max: 99.8 (25 Jun 2022 05:59)  HR: 114 (25 Jun 2022 05:59) (99 - 114)  BP: 119/72 (25 Jun 2022 05:59) (111/68 - 145/62)  BP(mean): 82 (25 Jun 2022 05:59) (82 - 82)  RR: 19 (25 Jun 2022 05:59) (16 - 19)  SpO2: 96% (25 Jun 2022 05:59) (95% - 98%)  I&O's Detail    24 Jun 2022 07:01  -  25 Jun 2022 07:00  --------------------------------------------------------  IN:  Total IN: 0 mL    OUT:    Indwelling Catheter - Urethral (mL): 2800 mL  Total OUT: 2800 mL    Total NET: -2800 mL        aluminum hydroxide/magnesium hydroxide/simethicone Suspension 30 milliLiter(s) Oral every 6 hours PRN  famotidine    Tablet 20 milliGRAM(s) Oral two times a day  metroNIDAZOLE  IVPB 500 milliGRAM(s) IV Intermittent every 8 hours  metroNIDAZOLE  IVPB      naloxone Injectable 0.4 milliGRAM(s) IV Push once  pantoprazole    Tablet 40 milliGRAM(s) Oral before breakfast      Physical Exam  General: AAOx3, No acute distress  Skin: No jaundice, no icterus  Abdomen: soft,  nondistended, lower abd wound w/ staples in place, incisional TTP, wound clean, no active bleeding, no rebound tenderness, no guarding, no palpable masses  : Normal external genitalia, Lora in place, UO blood tinged  Extremities: non edematous, no calf pain bilaterally        Labs:                        9.0    13.43 )-----------( 545      ( 25 Jun 2022 07:01 )             27.3     06-25    139  |  105  |  6<L>  ----------------------------<  107<H>  3.3<L>   |  24  |  0.48<L>    Ca    7.5<L>      25 Jun 2022 07:01    TPro  5.7<L>  /  Alb  2.0<L>  /  TBili  0.7  /  DBili  x   /  AST  146<H>  /  ALT  72<H>  /  AlkPhos  93  06-24

## 2022-06-25 NOTE — DISCHARGE NOTE PROVIDER - NSDCFUADDINST_GEN_ALL_CORE_FT
Take oral antibiotics as directed for 7 days. Nothing in vagina, no sex no tampons.  No heavy lifting,  no pushing,  ambulation daily as tolerated. Shower daily, clean wound well and keep dry after. Please maintain kendall for 2 weeks and follow up with Urology in 2 weeks. Please follow up with Dr. Patterson on Friday.

## 2022-06-25 NOTE — PROGRESS NOTE ADULT - SUBJECTIVE AND OBJECTIVE BOX
Patient is doing well. Pain is well controlled. She is having BM and passing gas.  WALI is removed. Urine catheter in place, bloody urine. Output adequate.   She is on regular diet. She tried to have a shower yesterday but there was no wather in the shower.     Waiting for the prelim result in the cultures done yesterday for discharge  Waiting for urology to give the OK for discharge     Abdominal incision, is closed, clean, healing well, staples in place

## 2022-06-29 LAB
CULTURE RESULTS: SIGNIFICANT CHANGE UP
CULTURE RESULTS: SIGNIFICANT CHANGE UP
SPECIMEN SOURCE: SIGNIFICANT CHANGE UP
SPECIMEN SOURCE: SIGNIFICANT CHANGE UP

## 2022-07-08 ENCOUNTER — APPOINTMENT (OUTPATIENT)
Dept: OBGYN | Facility: CLINIC | Age: 47
End: 2022-07-08

## 2022-07-08 VITALS — DIASTOLIC BLOOD PRESSURE: 76 MMHG | BODY MASS INDEX: 21.09 KG/M2 | SYSTOLIC BLOOD PRESSURE: 110 MMHG | WEIGHT: 108 LBS

## 2022-07-08 DIAGNOSIS — Z51.89 ENCOUNTER FOR OTHER SPECIFIED AFTERCARE: ICD-10-CM

## 2022-07-08 PROCEDURE — 99024 POSTOP FOLLOW-UP VISIT: CPT

## 2022-07-08 NOTE — HISTORY OF PRESENT ILLNESS
[de-identified] : doing well\par kendall catheter removed by urologist on Tuesday \par pathology benign \par

## 2022-07-29 ENCOUNTER — APPOINTMENT (OUTPATIENT)
Dept: OBGYN | Facility: CLINIC | Age: 47
End: 2022-07-29

## 2022-07-29 VITALS
SYSTOLIC BLOOD PRESSURE: 113 MMHG | OXYGEN SATURATION: 100 % | BODY MASS INDEX: 20.31 KG/M2 | DIASTOLIC BLOOD PRESSURE: 78 MMHG | HEART RATE: 99 BPM | WEIGHT: 104 LBS

## 2022-07-29 DIAGNOSIS — Z90.710 ACQUIRED ABSENCE OF BOTH CERVIX AND UTERUS: ICD-10-CM

## 2022-07-29 DIAGNOSIS — N99.81 OTHER INTRAOPERATIVE COMPLICATIONS OF GENITOURINARY SYSTEM: ICD-10-CM

## 2022-07-29 PROCEDURE — 99213 OFFICE O/P EST LOW 20 MIN: CPT | Mod: 24

## 2022-07-29 NOTE — HISTORY OF PRESENT ILLNESS
[FreeTextEntry1] : here for f/u after LH BS and laparotomy for bladder injury \par patient is doing well\par no pain \par no more blood in urine \par

## 2023-01-01 NOTE — ASU PATIENT PROFILE, ADULT - ACCEPTABLE
Congratulations on your decision to breastfeed, Health organizations around the world encourage and support breastfeeding for its wealth of evidence-based benefits for mother and baby.    Your Physician has recommended you breast feed your baby at least every 2 -3 hours around the clock for the first 2 weeks or until your baby is back up to birth weight.  Babies need at least 8 to 12 feedings in a 24 hour period. Offer both breast each feeding, alternate the breast with which you begin. This will help with proper milk removal, help stimulate milk production and maximize infant weight gain.  In the early, sleepy days, you may need to:    Be very attentive to feeding cues; Sucking on tongue or lips during sleep, sucking on fingers, moving arms and hands toward mouth, fussing or fidgeting while sleeping, turning head from side to side.  Put baby skin to skin to encourage frequent breastfeeding.  Keep him interested and awake during feedings  Massage and compress your breast during the feeding to increase milk flow to the baby. This will gently “remind” him to continue sucking.  Wake your baby in order for him to receive enough feedings.    We at [facility] want to support you every step of the way. For breastfeeding questions or concerns, please feel free to call our Lactation Services Department,   Monday - Saturday @ [phone] with your breastfeeding concerns.    You may call the [facility] Line @ [facility] at [phone] and talk with a nurse if you have any questions or concerns about your baby’s care 24 hours a day.          2

## 2023-04-27 NOTE — ED PROVIDER NOTE - INPATIENT RESIDENT/ACP NOTIFIED DATE
[email or web address] 17-Jun-2022 15:15

## 2023-06-13 NOTE — H&P PST ADULT - VENOUS THROMBOEMBOLISM CURRENT STATUS
(1) other risk factor (includes escalating BMI, pack-years of smoking, diabetes requiring insulin, chemotherapy, female gender and length of surgery) .

## 2023-08-04 ENCOUNTER — APPOINTMENT (OUTPATIENT)
Dept: OBGYN | Facility: CLINIC | Age: 48
End: 2023-08-04

## 2025-01-06 NOTE — DIETITIAN INITIAL EVALUATION ADULT - OTHER INFO
MSSA+, Pt given betadine nasal swab preoperatively. Pt admit w/ pelvic abscess/ fever s/p hysterectomy 6/8, s/p code sepsis. Pt seen a few times, asleep. Pt's mother provides info. Reports pt's intake as so/so, taking Enlive (displays finished bottle). Initial wt seems taken from previous admission. Current wt w/ I>O

## (undated) DEVICE — DRSG 4X4

## (undated) DEVICE — FOLEY TRAY 16FR SURESTEP LTX BG

## (undated) DEVICE — DRAIN JACKSON PRATT 15FR ROUND END NO TROCAR

## (undated) DEVICE — PACK GENERAL LAPAROSCOPY

## (undated) DEVICE — RUMI KOH-EFFICIENT 4.0CM

## (undated) DEVICE — SUT MAXON 1 96" T-60

## (undated) DEVICE — BLANKET WARMER FULL ADULT

## (undated) DEVICE — SOL IRR POUR H2O 500ML

## (undated) DEVICE — ELCTR GROUNDING PAD ADULT COVIDIEN

## (undated) DEVICE — PROTECTOR ONE STEP TRENDELENBERG ARM

## (undated) DEVICE — SUT POLYSORB 2-0 36" GS-21 UNDYED

## (undated) DEVICE — DRSG MEDIPORE DRESS IT 5-7/8 X 11"

## (undated) DEVICE — SUT VLOC 90 2-0 6" GS-21 VIOLET

## (undated) DEVICE — TROCAR COVIDIEN ENDO CLOSE SUTURING DEVICE

## (undated) DEVICE — WRAP COMPRESSION CALF MED

## (undated) DEVICE — SOL IRR POUR NS 0.9% 1500ML

## (undated) DEVICE — DRAPE LIGHT HANDLE COVER BLUE

## (undated) DEVICE — RUMI TIP GREEN 6.7MMX10CM DISP

## (undated) DEVICE — TUBING STRYKER PNEUMOSURE HEATED RTP

## (undated) DEVICE — LIGASURE BLUNT TIP NANO CTD 37CM

## (undated) DEVICE — SUT POLYSORB 2-0 18" GS-21 UNDYED

## (undated) DEVICE — TROCAR SURGIQUEST AIRSEAL 12MMX100MM

## (undated) DEVICE — SUT POLYSORB 0 18" GS-21

## (undated) DEVICE — DRAPE LAPAROTOMY W POUCHES

## (undated) DEVICE — NEEDLE COUNTER FOAM AND MAGNET 20-40

## (undated) DEVICE — TROCAR COVIDIEN VERSAPORT PLUS 11MM STRAIGHT

## (undated) DEVICE — FOR-ESU VALLEYLAB T7E14830DX: Type: DURABLE MEDICAL EQUIPMENT

## (undated) DEVICE — POSITIONER PINK PAD PIGAZZI SYSTEM

## (undated) DEVICE — TIP SURG POWDER LAP 2IN1 FLEX RIGID TIP

## (undated) DEVICE — GLV 7.5 PROTEXIS

## (undated) DEVICE — D HELP - CLEARVIEW CLEARIFY SYSTEM

## (undated) DEVICE — RUMI TIP BLUE 6.7MM X 8CM DISP

## (undated) DEVICE — POSITIONER MATTRESS PAD CONVOLUTED FOAM

## (undated) DEVICE — TUBING STRYKEFLOW II SUCTION / IRRIGATOR

## (undated) DEVICE — SUT POLYSORB 0 18" TIES UNDYED

## (undated) DEVICE — TROCAR SURGIQUEST AIRSEAL 5MMX100MM

## (undated) DEVICE — STAPLER SKIN VISI-STAT 35 WIDE

## (undated) DEVICE — BLANKET WARMER UPPER ADULT

## (undated) DEVICE — PACK MAJOR ABDOMINAL WITH LAP

## (undated) DEVICE — SUT POLYSORB 4-0 27" P-12 UNDYED

## (undated) DEVICE — RUMI TIP ORANGE 6.7MMX12CM DISP

## (undated) DEVICE — DRAPE LAVH 124X30X125"

## (undated) DEVICE — RUMI KOH-EFFICIENT 3.0CM

## (undated) DEVICE — SUT POLYSORB 0 30" GU-46

## (undated) DEVICE — SOL IRR POUR H2O 1500ML

## (undated) DEVICE — FOR-ESU VALLEYLAB T7E14842DX: Type: DURABLE MEDICAL EQUIPMENT

## (undated) DEVICE — TUBE TRI-LUMEN FILT USE W AIRSEAL

## (undated) DEVICE — TROCAR COVIDIEN VERSAONE BLADED FIXATION 5MM